# Patient Record
Sex: FEMALE | Race: WHITE | Employment: OTHER | URBAN - METROPOLITAN AREA
[De-identification: names, ages, dates, MRNs, and addresses within clinical notes are randomized per-mention and may not be internally consistent; named-entity substitution may affect disease eponyms.]

---

## 2024-01-15 ENCOUNTER — TELEPHONE (OUTPATIENT)
Dept: PULMONOLOGY | Age: 69
End: 2024-01-15

## 2024-01-15 NOTE — TELEPHONE ENCOUNTER
Npt r/b Asimanjulag, Lung Nodule    Please advise for scheduling    Referral and Ct results scanned into media

## 2024-02-21 ENCOUNTER — OFFICE VISIT (OUTPATIENT)
Dept: PULMONOLOGY | Age: 69
End: 2024-02-21
Payer: MEDICARE

## 2024-02-21 ENCOUNTER — TELEPHONE (OUTPATIENT)
Dept: PULMONOLOGY | Age: 69
End: 2024-02-21

## 2024-02-21 ENCOUNTER — PREP FOR PROCEDURE (OUTPATIENT)
Dept: PULMONOLOGY | Age: 69
End: 2024-02-21

## 2024-02-21 VITALS
SYSTOLIC BLOOD PRESSURE: 114 MMHG | WEIGHT: 149.6 LBS | HEIGHT: 64 IN | RESPIRATION RATE: 24 BRPM | DIASTOLIC BLOOD PRESSURE: 72 MMHG | HEART RATE: 114 BPM | OXYGEN SATURATION: 96 % | BODY MASS INDEX: 25.54 KG/M2

## 2024-02-21 DIAGNOSIS — J47.9 BRONCHIECTASIS WITHOUT COMPLICATION (HCC): ICD-10-CM

## 2024-02-21 DIAGNOSIS — R91.1 PULMONARY NODULE: ICD-10-CM

## 2024-02-21 DIAGNOSIS — J44.1 COPD EXACERBATION (HCC): ICD-10-CM

## 2024-02-21 DIAGNOSIS — R93.89 ABNORMAL CT OF THE CHEST: ICD-10-CM

## 2024-02-21 DIAGNOSIS — R91.1 PULMONARY NODULE: Primary | ICD-10-CM

## 2024-02-21 PROCEDURE — G8484 FLU IMMUNIZE NO ADMIN: HCPCS | Performed by: INTERNAL MEDICINE

## 2024-02-21 PROCEDURE — 1123F ACP DISCUSS/DSCN MKR DOCD: CPT | Performed by: INTERNAL MEDICINE

## 2024-02-21 PROCEDURE — G8400 PT W/DXA NO RESULTS DOC: HCPCS | Performed by: INTERNAL MEDICINE

## 2024-02-21 PROCEDURE — 4004F PT TOBACCO SCREEN RCVD TLK: CPT | Performed by: INTERNAL MEDICINE

## 2024-02-21 PROCEDURE — 1090F PRES/ABSN URINE INCON ASSESS: CPT | Performed by: INTERNAL MEDICINE

## 2024-02-21 PROCEDURE — G8419 CALC BMI OUT NRM PARAM NOF/U: HCPCS | Performed by: INTERNAL MEDICINE

## 2024-02-21 PROCEDURE — 3023F SPIROM DOC REV: CPT | Performed by: INTERNAL MEDICINE

## 2024-02-21 PROCEDURE — G8427 DOCREV CUR MEDS BY ELIG CLIN: HCPCS | Performed by: INTERNAL MEDICINE

## 2024-02-21 PROCEDURE — 99205 OFFICE O/P NEW HI 60 MIN: CPT | Performed by: INTERNAL MEDICINE

## 2024-02-21 PROCEDURE — 3017F COLORECTAL CA SCREEN DOC REV: CPT | Performed by: INTERNAL MEDICINE

## 2024-02-21 RX ORDER — FLUTICASONE FUROATE, UMECLIDINIUM BROMIDE AND VILANTEROL TRIFENATATE 100; 62.5; 25 UG/1; UG/1; UG/1
1 POWDER RESPIRATORY (INHALATION) DAILY
Qty: 60 EACH | Refills: 5 | Status: SHIPPED | OUTPATIENT
Start: 2024-02-21

## 2024-02-21 RX ORDER — SODIUM CHLORIDE 0.9 % (FLUSH) 0.9 %
5-40 SYRINGE (ML) INJECTION EVERY 12 HOURS SCHEDULED
Status: CANCELLED | OUTPATIENT
Start: 2024-02-21

## 2024-02-21 RX ORDER — POLYETHYLENE GLYCOL 3350 17 G/17G
17 POWDER, FOR SOLUTION ORAL DAILY
COMMUNITY

## 2024-02-21 RX ORDER — SIMETHICONE 125 MG
250 CAPSULE ORAL
COMMUNITY

## 2024-02-21 RX ORDER — DIPHENHYDRAMINE HCL 25 MG
25 CAPSULE ORAL NIGHTLY PRN
COMMUNITY

## 2024-02-21 RX ORDER — SODIUM CHLORIDE 0.9 % (FLUSH) 0.9 %
5-40 SYRINGE (ML) INJECTION PRN
Status: CANCELLED | OUTPATIENT
Start: 2024-02-21

## 2024-02-21 RX ORDER — AMLODIPINE BESYLATE 5 MG/1
5 TABLET ORAL DAILY
COMMUNITY

## 2024-02-21 RX ORDER — VARENICLINE TARTRATE 0.5 MG/1
.5-1 TABLET, FILM COATED ORAL SEE ADMIN INSTRUCTIONS
Qty: 57 TABLET | Refills: 0 | Status: SHIPPED | OUTPATIENT
Start: 2024-02-21

## 2024-02-21 RX ORDER — PREDNISONE 10 MG/1
TABLET ORAL
Qty: 30 TABLET | Refills: 0 | Status: SHIPPED | OUTPATIENT
Start: 2024-02-21 | End: 2024-03-04

## 2024-02-21 RX ORDER — VARENICLINE TARTRATE 1 MG/1
1 TABLET, FILM COATED ORAL 2 TIMES DAILY
Qty: 60 TABLET | Refills: 5 | Status: SHIPPED | OUTPATIENT
Start: 2024-02-21

## 2024-02-21 RX ORDER — ATORVASTATIN CALCIUM 10 MG/1
10 TABLET, FILM COATED ORAL NIGHTLY
COMMUNITY

## 2024-02-21 RX ORDER — HYDROCHLOROTHIAZIDE 12.5 MG/1
12.5 TABLET ORAL DAILY
COMMUNITY
Start: 2023-12-16

## 2024-02-21 RX ORDER — SODIUM CHLORIDE 9 MG/ML
INJECTION, SOLUTION INTRAVENOUS PRN
Status: CANCELLED | OUTPATIENT
Start: 2024-02-21

## 2024-02-21 NOTE — PROGRESS NOTES
[facility]                                                                    CHIEF COMPLAINT: Lung nodule     Consulting provider:  Dr. Velarde       HPI:   68 y.o. had CT chest to evaluate for chest pain. CT reviewed  by me and showed small RML 9 mm nodule. Not sure what makes better or worse. Has SOB cough and wheezes. Cough with clear sputum. No hemoptysis. No fever. Smoker 1 ppd since age 22. Used Albuterol up until 1 week ago stopped pallavi to chest pain. No cancer history.  Old records from outside reviewed by me and summarized.    Past Medical History:   Diagnosis Date    Arthritis     Cataract     Hiatal hernia     Hypertension        Past Surgical History:        Procedure Laterality Date    BREAST LUMPECTOMY  1984    BREAST LUMPECTOMY  1985    CATARACT EXTRACTION      COLONOSCOPY  2022    COLONOSCOPY  2014    GALLBLADDER SURGERY  2005    HYSTERECTOMY (CERVIX STATUS UNKNOWN)  1980    SMALL INTESTINE SURGERY  1971    UTERUS SURGERY  1978    cyst removal       Allergies:  is allergic to albuterol.  Social History:    TOBACCO:   reports that she has been smoking cigarettes. She started smoking about 47 years ago. She has a 47.1 pack-year smoking history. She has never used smokeless tobacco.  ETOH:   has no history on file for alcohol use.      Family History:       Problem Relation Age of Onset    High Blood Pressure Mother     Diabetes Mother     Heart Disease Mother     High Blood Pressure Father     Heart Disease Father        Current Medications:    Current Outpatient Medications:     amLODIPine (NORVASC) 5 MG tablet, Take 1 tablet by mouth daily, Disp: , Rfl:     hydroCHLOROthiazide 12.5 MG tablet, Take 1 tablet by mouth daily, Disp: , Rfl:     atorvastatin (LIPITOR) 10 MG tablet, Take 1 tablet by mouth nightly, Disp: , Rfl:     diphenhydrAMINE (BENADRYL) 25 MG capsule, Take 1 capsule by mouth nightly as needed, Disp: , Rfl:

## 2024-02-21 NOTE — PATIENT INSTRUCTIONS
PET scan will be scheduled-follow prep sheet given.     Bronchoscopy has been set up for _________ arrival time _________ at [facility].  [facility]   Nothing to eat or drink after midnight prior to the procedure.   Must have a  to bring you to and from the procedure.   Hold blood thinners as instructed prior to the procedure.

## 2024-02-21 NOTE — TELEPHONE ENCOUNTER
Pt scheduled for PET scan 2/23/24 at 10:15 am, arrival time 9:45 am.  Watch for results.     Pt also needs to be scheduled for robotic bronch.  Submitted case request for 3/4/24. Will need to confirm date/time and notify pt.

## 2024-02-23 ENCOUNTER — HOSPITAL ENCOUNTER (OUTPATIENT)
Dept: PET IMAGING | Age: 69
Discharge: HOME | End: 2024-02-23
Payer: MEDICARE

## 2024-02-23 ENCOUNTER — TELEPHONE (OUTPATIENT)
Dept: PULMONOLOGY | Age: 69
End: 2024-02-23

## 2024-02-23 DIAGNOSIS — R91.1 PULMONARY NODULE: ICD-10-CM

## 2024-02-23 PROCEDURE — 78815 PET IMAGE W/CT SKULL-THIGH: CPT

## 2024-02-23 PROCEDURE — A9609 HC RX DIAGNOSTIC RADIOPHARMACEUTICAL: HCPCS | Performed by: INTERNAL MEDICINE

## 2024-02-23 PROCEDURE — 3430000000 HC RX DIAGNOSTIC RADIOPHARMACEUTICAL: Performed by: INTERNAL MEDICINE

## 2024-02-23 RX ORDER — FLUDEOXYGLUCOSE F 18 200 MCI/ML
15.5 INJECTION, SOLUTION INTRAVENOUS
Status: COMPLETED | OUTPATIENT
Start: 2024-02-23 | End: 2024-02-23

## 2024-02-23 RX ADMIN — FLUDEOXYGLUCOSE F 18 15.5 MILLICURIE: 200 INJECTION, SOLUTION INTRAVENOUS at 09:27

## 2024-02-26 ENCOUNTER — HOSPITAL ENCOUNTER (OUTPATIENT)
Dept: CT IMAGING | Age: 69
Discharge: HOME | End: 2024-02-26
Payer: MEDICARE

## 2024-02-26 DIAGNOSIS — R91.1 PULMONARY NODULE: ICD-10-CM

## 2024-02-26 PROCEDURE — 71250 CT THORAX DX C-: CPT

## 2024-02-28 ENCOUNTER — OFFICE VISIT (OUTPATIENT)
Dept: PULMONOLOGY | Age: 69
End: 2024-02-28
Payer: MEDICARE

## 2024-02-28 VITALS
WEIGHT: 150.2 LBS | RESPIRATION RATE: 17 BRPM | DIASTOLIC BLOOD PRESSURE: 70 MMHG | HEART RATE: 90 BPM | OXYGEN SATURATION: 99 % | SYSTOLIC BLOOD PRESSURE: 112 MMHG | HEIGHT: 64 IN | BODY MASS INDEX: 25.64 KG/M2

## 2024-02-28 DIAGNOSIS — J47.9 BRONCHIECTASIS WITHOUT COMPLICATION (HCC): ICD-10-CM

## 2024-02-28 DIAGNOSIS — J44.9 CHRONIC OBSTRUCTIVE PULMONARY DISEASE, UNSPECIFIED COPD TYPE (HCC): ICD-10-CM

## 2024-02-28 DIAGNOSIS — R91.1 PULMONARY NODULE: Primary | ICD-10-CM

## 2024-02-28 PROCEDURE — G8400 PT W/DXA NO RESULTS DOC: HCPCS | Performed by: INTERNAL MEDICINE

## 2024-02-28 PROCEDURE — G8484 FLU IMMUNIZE NO ADMIN: HCPCS | Performed by: INTERNAL MEDICINE

## 2024-02-28 PROCEDURE — G8427 DOCREV CUR MEDS BY ELIG CLIN: HCPCS | Performed by: INTERNAL MEDICINE

## 2024-02-28 PROCEDURE — 4004F PT TOBACCO SCREEN RCVD TLK: CPT | Performed by: INTERNAL MEDICINE

## 2024-02-28 PROCEDURE — 99214 OFFICE O/P EST MOD 30 MIN: CPT | Performed by: INTERNAL MEDICINE

## 2024-02-28 PROCEDURE — 3017F COLORECTAL CA SCREEN DOC REV: CPT | Performed by: INTERNAL MEDICINE

## 2024-02-28 PROCEDURE — 1090F PRES/ABSN URINE INCON ASSESS: CPT | Performed by: INTERNAL MEDICINE

## 2024-02-28 PROCEDURE — G8419 CALC BMI OUT NRM PARAM NOF/U: HCPCS | Performed by: INTERNAL MEDICINE

## 2024-02-28 PROCEDURE — 3023F SPIROM DOC REV: CPT | Performed by: INTERNAL MEDICINE

## 2024-02-28 PROCEDURE — 1123F ACP DISCUSS/DSCN MKR DOCD: CPT | Performed by: INTERNAL MEDICINE

## 2024-02-28 RX ORDER — LEVALBUTEROL TARTRATE 45 UG/1
2 AEROSOL, METERED ORAL EVERY 4 HOURS PRN
Qty: 1 EACH | Refills: 3 | Status: SHIPPED | OUTPATIENT
Start: 2024-02-28 | End: 2025-02-27

## 2024-02-28 NOTE — TELEPHONE ENCOUNTER
Pt saw Dr. Cool this morning.  Cancelled bronch for 3/4/24.  Will need to check with PFT lab to see if pt can be worked in for PFT next week.      Pt is being referred to Dr. Amezcua as well, make sure she gets scheduled .

## 2024-02-28 NOTE — PROGRESS NOTES
[facility]                                                                    CHIEF COMPLAINT: Follow-up CT/PET      HPI:   PET scan/CT scan  reviewed by me and noted below. Results were dicussed with patient and multiple good questions were answered.    Doing better with IBD  On chantix and plan to quit smoking   Some cough with clear sputum  Not needing to use the rescue inhaler- Xopenex   No hemoptysis   Patient gave with more thoughts now not interested in having biopsy and wants to go straight for surgical resection    From prior visit:   68 y.o. had CT chest to evaluate for chest pain. CT reviewed  by me and showed small RML 9 mm nodule. Not sure what makes better or worse. Has SOB cough and wheezes. Cough with clear sputum. No hemoptysis. No fever. Smoker 1 ppd since age 22. Used Albuterol up until 1 week ago stopped pallavi to chest pain. No cancer history.  Old records from outside reviewed by me and summarized.    Past Medical History:   Diagnosis Date    Arthritis     Cataract     Hiatal hernia     Hypertension        Past Surgical History:        Procedure Laterality Date    BREAST LUMPECTOMY  1984    BREAST LUMPECTOMY  1985    CATARACT EXTRACTION      COLONOSCOPY  2022    COLONOSCOPY  2014    GALLBLADDER SURGERY  2005    HYSTERECTOMY (CERVIX STATUS UNKNOWN)  1980    SMALL INTESTINE SURGERY  1971    UTERUS SURGERY  1978    cyst removal       Allergies:  is allergic to albuterol.  Social History:    TOBACCO:   reports that she has been smoking cigarettes. She started smoking about 47 years ago. She has a 47.2 pack-year smoking history. She has never used smokeless tobacco.  ETOH:   reports no history of alcohol use.      Family History:       Problem Relation Age of Onset    High Blood Pressure Mother     Diabetes Mother     Heart Disease Mother     High Blood Pressure Father     Heart Disease Father        Current

## 2024-03-11 VITALS — HEART RATE: 72 BPM | RESPIRATION RATE: 14 BRPM | OXYGEN SATURATION: 99 %

## 2024-03-11 DIAGNOSIS — R91.1 PULMONARY NODULE: ICD-10-CM

## 2024-03-12 LAB
DLCO %PRED: 81 %
DLCO PRED: NORMAL
DLCO/VA %PRED: NORMAL
DLCO/VA PRED: NORMAL
DLCO/VA: NORMAL
DLCO: NORMAL
EXPIRATORY TIME-POST: NORMAL
EXPIRATORY TIME: NORMAL
FEF 25-75 %CHNG: NORMAL
FEF 25-75 POST %PRED: NORMAL
FEF 25-75% %PRED-PRE: NORMAL
FEF 25-75% PRED: NORMAL
FEF 25-75-POST: NORMAL
FEF 25-75-PRE: NORMAL
FEV1 %PRED-POST: NORMAL
FEV1 %PRED-PRE: 105 %
FEV1 PRED: NORMAL
FEV1-POST: NORMAL
FEV1-PRE: NORMAL
FEV1/FVC %PRED-POST: NORMAL
FEV1/FVC %PRED-PRE: NORMAL
FEV1/FVC PRED: NORMAL
FEV1/FVC-POST: NORMAL
FEV1/FVC-PRE: 108 %
FVC %PRED-POST: NORMAL
FVC %PRED-PRE: NORMAL
FVC PRED: NORMAL
FVC-POST: NORMAL
FVC-PRE: NORMAL
GAW %PRED: NORMAL
GAW PRED: NORMAL
GAW: NORMAL
IC PRE %PRED: NORMAL
IC PRED: NORMAL
IC: NORMAL
MEP: NORMAL
MIP: NORMAL
MVV %PRED-PRE: NORMAL
MVV PRED: NORMAL
MVV-PRE: NORMAL
PEF %PRED-POST: NORMAL
PEF %PRED-PRE: NORMAL
PEF PRED: NORMAL
PEF%CHNG: NORMAL
PEF-POST: NORMAL
PEF-PRE: NORMAL
RAW %PRED: NORMAL
RAW PRED: NORMAL
RAW: NORMAL
RV PRE %PRED: NORMAL
RV PRED: NORMAL
RV: NORMAL
SVC %PRED: NORMAL
SVC PRED: NORMAL
SVC: NORMAL
TLC PRE %PRED: 104 %
TLC PRED: NORMAL
TLC: NORMAL
VA %PRED: NORMAL
VA PRED: NORMAL
VA: NORMAL
VTG %PRED: NORMAL
VTG PRED: NORMAL
VTG: NORMAL

## 2024-03-12 ASSESSMENT — PULMONARY FUNCTION TESTS
FEV1_PERCENT_PREDICTED_PRE: 105
FEV1/FVC_PRE: 108

## 2024-03-14 NOTE — TELEPHONE ENCOUNTER
PFT result was scanned in media for pts appt today with Dr. Amezcua.  Follow up to determine if surgery gets scheduled.

## 2024-03-15 NOTE — TELEPHONE ENCOUNTER
Per note from Dr. Amezcua to Dr. Cool:     FW: Follow-Up Consult  Received: Yesterday  James Cool MD Jones, Amanda N, MA  See me in 4 weeks

## 2024-03-19 ENCOUNTER — TELEPHONE (OUTPATIENT)
Dept: PULMONOLOGY | Age: 69
End: 2024-03-19

## 2024-03-19 NOTE — TELEPHONE ENCOUNTER
Called [phone] (home)    Spoke w/ pt and informed to stop chantix and visit ED per Dr. Harding. . Pt voiced understanding.

## 2024-03-19 NOTE — TELEPHONE ENCOUNTER
Pt called and stated that she has been experiencing chest pain that has been getting increasingly worse. The pt believes the timing lines up with when pt started taking Chantix. Please advise.

## 2024-03-25 NOTE — PROGRESS NOTES
Cristy Thrasher    Age 68 y.o.    female    1955    MRN [id number]    4/15/2024  Arrival Time_____________  OR Time____________210 Min     Procedure(s):  RIGHT ROBOTIC MIDDLE LOBE  LOBECTOMY WITH LYMPH NODE DISSECTION  NOTE: CRYO NERVE BLOCK                      General   Surgeon(s):  Jose Manuel Amezcua, MD Sena, Lexie S, MD      DAY ADMIT ___  SDS/OP ___  OUTPT IN BED ___        Phone [phone] (home)                  PCP _____________________ Phone_________________ Epic ( ) Epic CE ( ) Appt ________    NOTES: _________________________________________ Consult/Cardio _______________    ____________________________________________________________________________    ____________________________________________________________________________  PAT APPT DATE:________ TIME: ________  FAXED QAD: _______  (__) H&P w/ Hospitalist    (__) PAT orders in EPIC    (__) Meet with PAT nurse  __________________________________________________________________________  Preop Nurse phone screen complete: _____________  (__) CBC     (__) W/ DIFF ___________  (__) CT CHEST  __________   (__) Hgb A1C    ___________  (__) CHEST X RAY   __________  (__) LIPID PROFILE  ___________  (__) EKG   __________  (__) PT-INR / APTT  ___________  (__) PFT's   __________  (__) BMP   ___________  (__) CAROTIDS  __________  (__) CMP   ___________  (__) VEIN MAPPING  __________  (__) U/A   ___________  (__) HISTORY & PHYSICAL __________  (__) URINE C & S  ___________  (__) CARDIAC CLEARANCE __________  (__) U/A W/ FLEX  ___________  (__) PULM. CLEARANCE __________  (__) SERUM PREGNANCY ___________  (__) Preop Orders in EPIC __________  (__) TYPE & SCREEN __________repeat ( ) (__)  __________________ __________  (__) Albumin   ___________  (__)  __________________ __________  (__) TRANSFERRIN  ___________  (__)  __________________ __________  (__) LIVER PROFILE  ___________  (__) URINE PREG DOS __________  (__) MRSA NASAL SWAB ___________  (__)

## 2024-04-03 RX ORDER — M-VIT,TX,IRON,MINS/CALC/FOLIC 27MG-0.4MG
1 TABLET ORAL DAILY
COMMUNITY

## 2024-04-03 NOTE — PROGRESS NOTES
Surgery Date and Time: 4/15/24 12:30 pm      Arrival Time: 10:30 am    The instructions given when and if a patient needs to stop oral intake prior to surgery varies. Follow the instructions you were given by your    Surgeon or RN during the Pre-op call.       __X__Nothing to eat or to drink after Midnight the night before the surgery. NO gum, mints, candy or ice chips day of surgery.                            Only take the following medications with a small sip of water the morning of surgery: amlodipine. Use trelegy inhaler                     Aspirin, Ibuprofen, Advil, Naproxen, Vitamin E and other Anti-inflammatory products and supplements should be stopped for 5 -7days before surgery      or as directed by your physician.      Check with your Surgeon/PCP/Cardiologist regarding stopping Plavix, Coumadin, Eliquis, Lovenox, Effient, Pradaxa, Xarelto, Fragmin or other blood thinners     and follow their instructions.  Medication: N/A                Last dose:                - If you take a long acting-insulin, please ask your Primary Care Physician if you should decrease your dose the night before surgery.    - Do not smoke or vape, and do not drink any alcoholic beverages 24 hours prior to surgery, this includes NA Beer. Refrain from using any recreational drugs,     including non-prescribed prescription drugs.     -You may brush your teeth and gargle the morning of surgery.  DO NOT SWALLOW WATER.    -You MUST plan for a responsible adult to stay on site while you are here and take you home after your surgery. You will not be allowed to leave alone or drive               yourself home. It is requested someone stay with you the first 24 hrs. Your surgery will be cancelled if you do not have a ride home with a responsible adult.    -A parent/legal guardian must accompany a child scheduled for surgery and plan to stay at the hospital until the child is discharged.  Please do not bring other                children  with you.    -Please wear simple, loose-fitting clothing to the hospital. Do not bring valuables (money, credit cards, checkbooks, etc.) Do not wear any makeup (including                no eye makeup) and no nail polish if applicable.             - DO NOT wear any jewelry or body piercings day of surgery.  All body piercing jewelry must be removed.             - If you have dentures they will be removed before going to the OR; we will provide a container.  If you wear contact lenses or glasses, they will be removed,               bring a case for them or wear glasses day of surgery.             - Please see your family doctor/pediatrician for a Preop History & Physical and/or concerning medications within 30 days of the surgery date.                  Non-[facility] physicians can fax H&P/test results to [phone]. You may need cardiac clearance if you see a cardiologist, check with PCP or surgeon.              -If you have a Living Will and Durable Power of  for Healthcare, please bring in a copy to be scanned at registration.              -Notify your Surgeon if you develop any illness between now and the surgery date, cough, cold, fever, sore throat, nausea, vomiting, etc.  Please notify your                surgeon if you experience dizziness, shortness of breath or blurred vision between now & the time of your surgery.              -DO NOT shave your operative site less than 4 days prior to surgery. For face & neck surgery, men may use an electric razor up to 2 days prior to surgery.   -To help prevent infection, change your sheets the night before surgery. Also, shower the night before & morning of surgery using an antibacterial     soap (Dial or Safeguard) or Hibiclens soap as instructed by your surgeon. Do not apply lotion after shower or day of surgery.              -To provide excellent care visitors will be limited to two per room at any given time.              -Please bring your picture ID and

## 2024-04-08 ENCOUNTER — OFFICE VISIT (OUTPATIENT)
Dept: PULMONOLOGY | Age: 69
End: 2024-04-08
Payer: MEDICARE

## 2024-04-08 VITALS
WEIGHT: 146.2 LBS | SYSTOLIC BLOOD PRESSURE: 122 MMHG | DIASTOLIC BLOOD PRESSURE: 86 MMHG | RESPIRATION RATE: 20 BRPM | BODY MASS INDEX: 24.96 KG/M2 | HEART RATE: 87 BPM | OXYGEN SATURATION: 97 % | HEIGHT: 64 IN

## 2024-04-08 DIAGNOSIS — J47.9 BRONCHIECTASIS WITHOUT COMPLICATION (HCC): ICD-10-CM

## 2024-04-08 DIAGNOSIS — R93.89 ABNORMAL CT OF THE CHEST: ICD-10-CM

## 2024-04-08 DIAGNOSIS — R91.1 PULMONARY NODULE: Primary | ICD-10-CM

## 2024-04-08 PROCEDURE — G8400 PT W/DXA NO RESULTS DOC: HCPCS | Performed by: INTERNAL MEDICINE

## 2024-04-08 PROCEDURE — 99214 OFFICE O/P EST MOD 30 MIN: CPT | Performed by: INTERNAL MEDICINE

## 2024-04-08 PROCEDURE — 1036F TOBACCO NON-USER: CPT | Performed by: INTERNAL MEDICINE

## 2024-04-08 PROCEDURE — 1123F ACP DISCUSS/DSCN MKR DOCD: CPT | Performed by: INTERNAL MEDICINE

## 2024-04-08 PROCEDURE — 1090F PRES/ABSN URINE INCON ASSESS: CPT | Performed by: INTERNAL MEDICINE

## 2024-04-08 PROCEDURE — 3017F COLORECTAL CA SCREEN DOC REV: CPT | Performed by: INTERNAL MEDICINE

## 2024-04-08 PROCEDURE — G8427 DOCREV CUR MEDS BY ELIG CLIN: HCPCS | Performed by: INTERNAL MEDICINE

## 2024-04-08 PROCEDURE — G8419 CALC BMI OUT NRM PARAM NOF/U: HCPCS | Performed by: INTERNAL MEDICINE

## 2024-04-08 NOTE — PROGRESS NOTES
[facility] Pulmonary, Critical [facility] Sleep Special[facility]                                                                    CHIEF COMPLAINT: Follow-up nodule       HPI:   Surgery scheduled for Monday   Doing fine   No cough or sputum  No hemoptysis   Smoking still 2 cig/day    Xopenex once a day         From prior visit:   68 y.o. had CT chest to evaluate for chest pain. CT reviewed  by me and showed small RML 9 mm nodule. Not sure what makes better or worse. Has SOB cough and wheezes. Cough with clear sputum. No hemoptysis. No fever. Smoker 1 ppd since age 22. Used Albuterol up until 1 week ago stopped pallavi to chest pain. No cancer history.  Old records from outside reviewed by me and summarized.    Past Medical History:   Diagnosis Date    Arthritis     Cataract     Hiatal hernia     Hyperlipidemia     Hypertension        Past Surgical History:        Procedure Laterality Date    BREAST LUMPECTOMY  1984    BREAST LUMPECTOMY  1985    CATARACT EXTRACTION      Pt has cataract in right eye but states has not been removed    COLONOSCOPY  2022    COLONOSCOPY  2014    GALLBLADDER SURGERY  2005    HYSTERECTOMY (CERVIX STATUS UNKNOWN)  1980    SMALL INTESTINE SURGERY  1971    UTERUS SURGERY  1978    cyst removal       Allergies:  is allergic to albuterol.  Social History:    TOBACCO:   reports that she quit smoking about 5 weeks ago. Her smoking use included cigarettes. She started smoking about 47 years ago. She has a 47.2 pack-year smoking history. She has never used smokeless tobacco.  ETOH:   reports no history of alcohol use.      Family History:       Problem Relation Age of Onset    High Blood Pressure Mother     Diabetes Mother     Heart Disease Mother     High Blood Pressure Father     Heart Disease Father        Current Medications:    Current Outpatient Medications:     Multiple Vitamins-Minerals (THERAPEUTIC MULTIVITAMIN-MINERALS) tablet, Take 1 tablet by mouth daily, Disp: , Rfl:

## 2024-04-10 RX ORDER — OXYCODONE HYDROCHLORIDE AND ACETAMINOPHEN 5; 325 MG/1; MG/1
1 TABLET ORAL EVERY 4 HOURS PRN
COMMUNITY
End: 2024-04-10

## 2024-04-10 NOTE — PROGRESS NOTES
Cristy Thrasher    Age 68 y.o.    female    1955    MRN [id number]    4/15/2024  Arrival Time_____________  OR Time____________210 Min     Procedure(s):  RIGHT ROBOTIC MIDDLE LOBE  LOBECTOMY WITH LYMPH NODE DISSECTION  NOTE: CRYO NERVE BLOCK                      General   Surgeon(s):  Jose Manuel Amezcua, MD Sena, Lexie S, MD      DAY ADMIT ___  SDS/OP ___  OUTPT IN BED ___        Phone [phone] (home)                  PCP _____________________ Phone_________________ Epic ( ) Epic CE ( ) Appt ________    NOTES: _________________________________________ Consult/Cardio _______________    ____________________________________________________________________________    ____________________________________________________________________________  PAT APPT DATE:________ TIME: ________  FAXED QAD: _______  (__) H&P w/ Hospitalist    (__) PAT orders in EPIC    (__) Meet with PAT nurse  __________________________________________________________________________  Preop Nurse phone screen complete: _____________  (__) CBC     (__) W/ DIFF ___________  (__) CT CHEST  __________   (__) Hgb A1C    ___________  (__) CHEST X RAY   __________  (__) LIPID PROFILE  ___________  (__) EKG   __________  (__) PT-INR / APTT  ___________  (__) PFT's   __________  (__) BMP   ___________  (__) CAROTIDS  __________  (__) CMP   ___________  (__) VEIN MAPPING  __________  (__) U/A   ___________  (__) HISTORY & PHYSICAL __________  (__) URINE C & S  ___________  (__) CARDIAC CLEARANCE __________  (__) U/A W/ FLEX  ___________  (__) PULM. CLEARANCE __________  (__) SERUM PREGNANCY ___________  (__) Preop Orders in EPIC __________  (__) TYPE & SCREEN __________repeat ( ) (__)  __________________ __________  (__) Albumin   ___________  (__)  __________________ __________  (__) TRANSFERRIN  ___________  (__)  __________________ __________  (__) LIVER PROFILE  ___________  (__) URINE PREG DOS __________  (__) MRSA NASAL SWAB ___________  (__)

## 2024-04-11 ENCOUNTER — OFFICE VISIT (OUTPATIENT)
Age: 69
End: 2024-04-11
Payer: MEDICARE

## 2024-04-11 VITALS
BODY MASS INDEX: 25.1 KG/M2 | SYSTOLIC BLOOD PRESSURE: 124 MMHG | HEART RATE: 79 BPM | HEIGHT: 64 IN | WEIGHT: 147 LBS | DIASTOLIC BLOOD PRESSURE: 72 MMHG | OXYGEN SATURATION: 98 %

## 2024-04-11 DIAGNOSIS — R07.2 PRECORDIAL PAIN: ICD-10-CM

## 2024-04-11 DIAGNOSIS — I25.10 CORONARY ARTERY DISEASE INVOLVING NATIVE CORONARY ARTERY OF NATIVE HEART WITHOUT ANGINA PECTORIS: ICD-10-CM

## 2024-04-11 DIAGNOSIS — R06.02 SOB (SHORTNESS OF BREATH): ICD-10-CM

## 2024-04-11 DIAGNOSIS — Z01.810 PREOPERATIVE CARDIOVASCULAR EXAMINATION: Primary | ICD-10-CM

## 2024-04-11 DIAGNOSIS — Z01.810 PREOP CARDIOVASCULAR EXAM: ICD-10-CM

## 2024-04-11 PROCEDURE — 1090F PRES/ABSN URINE INCON ASSESS: CPT | Performed by: INTERNAL MEDICINE

## 2024-04-11 PROCEDURE — 1123F ACP DISCUSS/DSCN MKR DOCD: CPT | Performed by: INTERNAL MEDICINE

## 2024-04-11 PROCEDURE — 99204 OFFICE O/P NEW MOD 45 MIN: CPT | Performed by: INTERNAL MEDICINE

## 2024-04-11 PROCEDURE — G8427 DOCREV CUR MEDS BY ELIG CLIN: HCPCS | Performed by: INTERNAL MEDICINE

## 2024-04-11 PROCEDURE — 3017F COLORECTAL CA SCREEN DOC REV: CPT | Performed by: INTERNAL MEDICINE

## 2024-04-11 PROCEDURE — 1036F TOBACCO NON-USER: CPT | Performed by: INTERNAL MEDICINE

## 2024-04-11 PROCEDURE — 93000 ELECTROCARDIOGRAM COMPLETE: CPT | Performed by: INTERNAL MEDICINE

## 2024-04-11 PROCEDURE — G8400 PT W/DXA NO RESULTS DOC: HCPCS | Performed by: INTERNAL MEDICINE

## 2024-04-11 PROCEDURE — G8419 CALC BMI OUT NRM PARAM NOF/U: HCPCS | Performed by: INTERNAL MEDICINE

## 2024-04-11 RX ORDER — ESOMEPRAZOLE MAGNESIUM 20 MG/1
20 GRANULE, DELAYED RELEASE ORAL DAILY
COMMUNITY

## 2024-04-11 NOTE — PROGRESS NOTES
spheres  Moves all extremities well  Exhibits normal gait balance and coordination  No abnormalities of mood, affect, memory, mentation, or behavior are noted    CT lung screening 1/9/2024        Assessment:   Chest pain - typical/atypical features  SOB - cardiac vs pulm. Consider angina equivalent   Preoperative cardiac risk assessment for right robotic lobectomy and ICNB with Dr. Amezcua and Dr. Sena on 4/15/2024.    ~Right middle lobe pulmonary nodule   Coronary artery disease- noted on CT lung screening. Severity known    Hypertension - stable  Hyperlipidemia  Smoking - cessation advised   Family history of heart diease     Plan:  ~Continue smoking cessation   ~Recommend an echocardiogram    ~Recommend a stress test- Lexiscan Myoview to evaluate chest pain, shortness of breath, and preop. Patient is not able to walk on a treadmill   ~Testing prior to clearance for surgery     Cardiac medications reviewed including indications and pertinent side effects. Medication list updated at this visit.   Patient verbalizes understanding of the need for treatment and education has been provided at today's visit. Additional education material will be provided in after visit summary.    Check blood pressure and heart rate at home a few times per week- keep a log with dates and times and bring to office visit   Regular exercise and following a healthy diet encouraged   Follow up with me based on testing       Scribe's attestation:  This note was scribed in the presence of Dr. Almas Greene M.D. By Erika Jack RN    The scribe’s documentation has been prepared under my direction and personally reviewed by me in its entirety.  I confirm that the note above accurately reflects all work, treatment, procedures, and medical decision making performed by me.    Dr. Almas Greene MD      Thank you for allowing me to participate in the care of this individual.      Jhonatan Greene M.D., [facility], [facility]

## 2024-04-11 NOTE — PROGRESS NOTES
Spoke with pt and she states did not realize still needed labs pre op even though we talked about it 4/3/24 during PAT call. States will have it done tomorrow either at [facility], [place], or [facility] after cardiac testing.

## 2024-04-11 NOTE — PATIENT INSTRUCTIONS
Plan:  ~Continue smoking cessation   ~Recommend an echocardiogram which is an ultrasound of your heart to evaluate heart function, structures and valves.   ~Recommend a stress test- Lexiscan Myoview to evaluate chest pain, shortness of breath, and preop. Patient is not able to walk on a treadmill   Call [facility] scheduling at [phone] to schedule testing    ~Testing prior to clearance for surgery     Cardiac medications reviewed including indications and pertinent side effects. Medication list updated at this visit.   Patient verbalizes understanding of the need for treatment and education has been provided at today's visit. Additional education material will be provided in after visit summary.    Check blood pressure and heart rate at home a few times per week- keep a log with dates and times and bring to office visit   Regular exercise and following a healthy diet encouraged   Follow up with me based on testing

## 2024-04-12 ENCOUNTER — TELEPHONE (OUTPATIENT)
Dept: CARDIOLOGY CLINIC | Age: 69
End: 2024-04-12

## 2024-04-12 ENCOUNTER — HOSPITAL ENCOUNTER (OUTPATIENT)
Dept: NON INVASIVE DIAGNOSTICS | Age: 69
Discharge: HOME | End: 2024-04-12
Payer: MEDICARE

## 2024-04-12 ENCOUNTER — HOSPITAL ENCOUNTER (OUTPATIENT)
Dept: NON INVASIVE DIAGNOSTICS | Age: 69
Discharge: HOME | End: 2024-04-12
Attending: INTERNAL MEDICINE
Payer: MEDICARE

## 2024-04-12 ENCOUNTER — TELEPHONE (OUTPATIENT)
Dept: CARDIOTHORACIC SURGERY | Age: 69
End: 2024-04-12

## 2024-04-12 ENCOUNTER — ANESTHESIA EVENT (OUTPATIENT)
Dept: OPERATING ROOM | Age: 69
DRG: 165 | End: 2024-04-12
Payer: MEDICARE

## 2024-04-12 DIAGNOSIS — I25.10 CORONARY ARTERY DISEASE INVOLVING NATIVE CORONARY ARTERY OF NATIVE HEART WITHOUT ANGINA PECTORIS: ICD-10-CM

## 2024-04-12 DIAGNOSIS — R06.02 SOB (SHORTNESS OF BREATH): ICD-10-CM

## 2024-04-12 DIAGNOSIS — Z01.810 PREOPERATIVE CARDIOVASCULAR EXAMINATION: ICD-10-CM

## 2024-04-12 DIAGNOSIS — R07.2 PRECORDIAL PAIN: ICD-10-CM

## 2024-04-12 DIAGNOSIS — I31.39 PERICARDIAL EFFUSION: Primary | ICD-10-CM

## 2024-04-12 DIAGNOSIS — Z01.818 PREOPERATIVE TESTING: ICD-10-CM

## 2024-04-12 LAB
APTT BLD: 34.1 SEC (ref 22.1–36.4)
INR PPP: 0.93 (ref 0.85–1.15)
PROTHROMBIN TIME: 12.7 SEC (ref 11.9–14.9)

## 2024-04-12 PROCEDURE — 6360000002 HC RX W HCPCS: Performed by: INTERNAL MEDICINE

## 2024-04-12 PROCEDURE — 3430000000 HC RX DIAGNOSTIC RADIOPHARMACEUTICAL: Performed by: INTERNAL MEDICINE

## 2024-04-12 PROCEDURE — 78452 HT MUSCLE IMAGE SPECT MULT: CPT

## 2024-04-12 PROCEDURE — 93017 CV STRESS TEST TRACING ONLY: CPT

## 2024-04-12 PROCEDURE — A9502 TC99M TETROFOSMIN: HCPCS | Performed by: INTERNAL MEDICINE

## 2024-04-12 PROCEDURE — 93306 TTE W/DOPPLER COMPLETE: CPT

## 2024-04-12 RX ORDER — REGADENOSON 0.08 MG/ML
0.4 INJECTION, SOLUTION INTRAVENOUS
Status: COMPLETED | OUTPATIENT
Start: 2024-04-12 | End: 2024-04-12

## 2024-04-12 RX ADMIN — REGADENOSON 0.4 MG: 0.08 INJECTION, SOLUTION INTRAVENOUS at 10:15

## 2024-04-12 RX ADMIN — TETROFOSMIN 30 MILLICURIE: 1.38 INJECTION, POWDER, LYOPHILIZED, FOR SOLUTION INTRAVENOUS at 10:15

## 2024-04-12 RX ADMIN — TETROFOSMIN 10 MILLICURIE: 1.38 INJECTION, POWDER, LYOPHILIZED, FOR SOLUTION INTRAVENOUS at 08:35

## 2024-04-12 NOTE — TELEPHONE ENCOUNTER
Spoke with pt relayed message per [facility]. Placed clearance letter in chart as she requested.

## 2024-04-12 NOTE — TELEPHONE ENCOUNTER
Created telephone encounter. Spoke with Shara relayed message per [facility] regarding echo. Pt verbalized understanding.   She was given CS number to set up echo as requested.

## 2024-04-12 NOTE — TELEPHONE ENCOUNTER
Patient's daughter called stating patient completed testing today and is to hear about surgery following results. If she hasn't heard from anyone by 4:00 pm, I have given her Dr. Amezcua's nurse- Gemma's number to follow up.

## 2024-04-12 NOTE — TELEPHONE ENCOUNTER
----- Message from Almas Greene MD sent at 4/12/2024 12:55 PM EDT -----  Please notify patient that their stress test is normal  Ok for surgery

## 2024-04-12 NOTE — TELEPHONE ENCOUNTER
----- Message from Almas Greene MD sent at 4/12/2024 12:04 PM EDT -----  Please notify patient that their echo shows normal heart fxn  There is some fluid around heart and a valve that leaks that just need to be monitored for now  Repeat echo- limited 1 month (order placed)  Still waiting on stress test results. Should be later today

## 2024-04-13 LAB
ABO + RH BLD: NORMAL
ANION GAP SERPL CALCULATED.3IONS-SCNC: 12 MMOL/L (ref 3–16)
BASOPHILS # BLD: 0.1 K/UL (ref 0–0.2)
BASOPHILS NFR BLD: 1.2 %
BLD GP AB SCN SERPL QL: NORMAL
BUN SERPL-MCNC: 19 MG/DL (ref 7–20)
CALCIUM SERPL-MCNC: 10 MG/DL (ref 8.3–10.6)
CHLORIDE SERPL-SCNC: 102 MMOL/L (ref 99–110)
CO2 SERPL-SCNC: 29 MMOL/L (ref 21–32)
CREAT SERPL-MCNC: 0.8 MG/DL (ref 0.6–1.2)
DEPRECATED RDW RBC AUTO: 13.4 % (ref 12.4–15.4)
EOSINOPHIL # BLD: 0.2 K/UL (ref 0–0.6)
EOSINOPHIL NFR BLD: 2.6 %
EST. AVERAGE GLUCOSE BLD GHB EST-MCNC: 111.2 MG/DL
GFR SERPLBLD CREATININE-BSD FMLA CKD-EPI: 80 ML/MIN/{1.73_M2}
GLUCOSE SERPL-MCNC: 74 MG/DL (ref 70–99)
HBA1C MFR BLD: 5.5 %
HCT VFR BLD AUTO: 39.4 % (ref 36–48)
HGB BLD-MCNC: 13.8 G/DL (ref 12–16)
LYMPHOCYTES # BLD: 1.5 K/UL (ref 1–5.1)
LYMPHOCYTES NFR BLD: 22.5 %
MCH RBC QN AUTO: 33 PG (ref 26–34)
MCHC RBC AUTO-ENTMCNC: 35 G/DL (ref 31–36)
MCV RBC AUTO: 94.3 FL (ref 80–100)
MONOCYTES # BLD: 0.5 K/UL (ref 0–1.3)
MONOCYTES NFR BLD: 7.8 %
NEUTROPHILS # BLD: 4.3 K/UL (ref 1.7–7.7)
NEUTROPHILS NFR BLD: 65.9 %
PLATELET # BLD AUTO: 170 K/UL (ref 135–450)
PMV BLD AUTO: 10.9 FL (ref 5–10.5)
POTASSIUM SERPL-SCNC: 4.3 MMOL/L (ref 3.5–5.1)
RBC # BLD AUTO: 4.18 M/UL (ref 4–5.2)
SODIUM SERPL-SCNC: 143 MMOL/L (ref 136–145)
WBC # BLD AUTO: 6.6 K/UL (ref 4–11)

## 2024-04-15 ENCOUNTER — APPOINTMENT (OUTPATIENT)
Dept: GENERAL RADIOLOGY | Age: 69
DRG: 165 | End: 2024-04-15
Attending: STUDENT IN AN ORGANIZED HEALTH CARE EDUCATION/TRAINING PROGRAM
Payer: MEDICARE

## 2024-04-15 ENCOUNTER — ANESTHESIA (OUTPATIENT)
Dept: OPERATING ROOM | Age: 69
DRG: 165 | End: 2024-04-15
Payer: MEDICARE

## 2024-04-15 ENCOUNTER — HOSPITAL ENCOUNTER (INPATIENT)
Age: 69
LOS: 2 days | Discharge: HOME | DRG: 165 | End: 2024-04-17
Attending: STUDENT IN AN ORGANIZED HEALTH CARE EDUCATION/TRAINING PROGRAM | Admitting: STUDENT IN AN ORGANIZED HEALTH CARE EDUCATION/TRAINING PROGRAM
Payer: MEDICARE

## 2024-04-15 DIAGNOSIS — R91.1 RIGHT MIDDLE LOBE PULMONARY NODULE: Primary | ICD-10-CM

## 2024-04-15 DIAGNOSIS — R91.1 PULMONARY NODULE: ICD-10-CM

## 2024-04-15 LAB
ABO + RH BLD: NORMAL
BLD GP AB SCN SERPL QL: NORMAL
DEPRECATED RDW RBC AUTO: 13.4 % (ref 12.4–15.4)
GLUCOSE BLD-MCNC: 88 MG/DL (ref 70–99)
HCG UR QL: NEGATIVE
HCT VFR BLD AUTO: 37.1 % (ref 36–48)
HGB BLD-MCNC: 12.5 G/DL (ref 12–16)
MCH RBC QN AUTO: 32.3 PG (ref 26–34)
MCHC RBC AUTO-ENTMCNC: 33.7 G/DL (ref 31–36)
MCV RBC AUTO: 95.9 FL (ref 80–100)
PERFORMED ON: NORMAL
PLATELET # BLD AUTO: 155 K/UL (ref 135–450)
PMV BLD AUTO: 10.3 FL (ref 5–10.5)
RBC # BLD AUTO: 3.87 M/UL (ref 4–5.2)
WBC # BLD AUTO: 12.5 K/UL (ref 4–11)

## 2024-04-15 PROCEDURE — S2900 ROBOTIC SURGICAL SYSTEM: HCPCS | Performed by: STUDENT IN AN ORGANIZED HEALTH CARE EDUCATION/TRAINING PROGRAM

## 2024-04-15 PROCEDURE — 2500000003 HC RX 250 WO HCPCS: Performed by: ANESTHESIOLOGY

## 2024-04-15 PROCEDURE — 85027 COMPLETE CBC AUTOMATED: CPT

## 2024-04-15 PROCEDURE — 88305 TISSUE EXAM BY PATHOLOGIST: CPT

## 2024-04-15 PROCEDURE — 94761 N-INVAS EAR/PLS OXIMETRY MLT: CPT

## 2024-04-15 PROCEDURE — 2000000000 HC ICU R&B

## 2024-04-15 PROCEDURE — 86900 BLOOD TYPING SEROLOGIC ABO: CPT

## 2024-04-15 PROCEDURE — 36415 COLL VENOUS BLD VENIPUNCTURE: CPT

## 2024-04-15 PROCEDURE — 88341 IMHCHEM/IMCYTCHM EA ADD ANTB: CPT

## 2024-04-15 PROCEDURE — 51798 US URINE CAPACITY MEASURE: CPT

## 2024-04-15 PROCEDURE — 88307 TISSUE EXAM BY PATHOLOGIST: CPT

## 2024-04-15 PROCEDURE — 6370000000 HC RX 637 (ALT 250 FOR IP): Performed by: STUDENT IN AN ORGANIZED HEALTH CARE EDUCATION/TRAINING PROGRAM

## 2024-04-15 PROCEDURE — C9290 INJ, BUPIVACAINE LIPOSOME: HCPCS | Performed by: STUDENT IN AN ORGANIZED HEALTH CARE EDUCATION/TRAINING PROGRAM

## 2024-04-15 PROCEDURE — 6360000002 HC RX W HCPCS: Performed by: STUDENT IN AN ORGANIZED HEALTH CARE EDUCATION/TRAINING PROGRAM

## 2024-04-15 PROCEDURE — C1889 IMPLANT/INSERT DEVICE, NOC: HCPCS | Performed by: STUDENT IN AN ORGANIZED HEALTH CARE EDUCATION/TRAINING PROGRAM

## 2024-04-15 PROCEDURE — 07B74ZZ EXCISION OF THORAX LYMPHATIC, PERCUTANEOUS ENDOSCOPIC APPROACH: ICD-10-PCS | Performed by: STUDENT IN AN ORGANIZED HEALTH CARE EDUCATION/TRAINING PROGRAM

## 2024-04-15 PROCEDURE — 86850 RBC ANTIBODY SCREEN: CPT

## 2024-04-15 PROCEDURE — 3700000001 HC ADD 15 MINUTES (ANESTHESIA): Performed by: STUDENT IN AN ORGANIZED HEALTH CARE EDUCATION/TRAINING PROGRAM

## 2024-04-15 PROCEDURE — 94669 MECHANICAL CHEST WALL OSCILL: CPT

## 2024-04-15 PROCEDURE — 3700000000 HC ANESTHESIA ATTENDED CARE: Performed by: STUDENT IN AN ORGANIZED HEALTH CARE EDUCATION/TRAINING PROGRAM

## 2024-04-15 PROCEDURE — 0BTD4ZZ RESECTION OF RIGHT MIDDLE LUNG LOBE, PERCUTANEOUS ENDOSCOPIC APPROACH: ICD-10-PCS | Performed by: STUDENT IN AN ORGANIZED HEALTH CARE EDUCATION/TRAINING PROGRAM

## 2024-04-15 PROCEDURE — 88342 IMHCHEM/IMCYTCHM 1ST ANTB: CPT

## 2024-04-15 PROCEDURE — 2720000010 HC SURG SUPPLY STERILE: Performed by: STUDENT IN AN ORGANIZED HEALTH CARE EDUCATION/TRAINING PROGRAM

## 2024-04-15 PROCEDURE — 88309 TISSUE EXAM BY PATHOLOGIST: CPT

## 2024-04-15 PROCEDURE — 3E0T3BZ INTRODUCTION OF ANESTHETIC AGENT INTO PERIPHERAL NERVES AND PLEXI, PERCUTANEOUS APPROACH: ICD-10-PCS | Performed by: STUDENT IN AN ORGANIZED HEALTH CARE EDUCATION/TRAINING PROGRAM

## 2024-04-15 PROCEDURE — 3600000019 HC SURGERY ROBOT ADDTL 15MIN: Performed by: STUDENT IN AN ORGANIZED HEALTH CARE EDUCATION/TRAINING PROGRAM

## 2024-04-15 PROCEDURE — 2709999900 HC NON-CHARGEABLE SUPPLY: Performed by: STUDENT IN AN ORGANIZED HEALTH CARE EDUCATION/TRAINING PROGRAM

## 2024-04-15 PROCEDURE — 3600000009 HC SURGERY ROBOT BASE: Performed by: STUDENT IN AN ORGANIZED HEALTH CARE EDUCATION/TRAINING PROGRAM

## 2024-04-15 PROCEDURE — 86901 BLOOD TYPING SEROLOGIC RH(D): CPT

## 2024-04-15 PROCEDURE — 2700000000 HC OXYGEN THERAPY PER DAY

## 2024-04-15 PROCEDURE — 84703 CHORIONIC GONADOTROPIN ASSAY: CPT

## 2024-04-15 PROCEDURE — 6360000002 HC RX W HCPCS: Performed by: NURSE ANESTHETIST, CERTIFIED REGISTERED

## 2024-04-15 PROCEDURE — 94640 AIRWAY INHALATION TREATMENT: CPT

## 2024-04-15 PROCEDURE — 2580000003 HC RX 258: Performed by: ANESTHESIOLOGY

## 2024-04-15 PROCEDURE — C2618 PROBE/NEEDLE, CRYO: HCPCS | Performed by: STUDENT IN AN ORGANIZED HEALTH CARE EDUCATION/TRAINING PROGRAM

## 2024-04-15 PROCEDURE — 2580000003 HC RX 258: Performed by: STUDENT IN AN ORGANIZED HEALTH CARE EDUCATION/TRAINING PROGRAM

## 2024-04-15 PROCEDURE — 71045 X-RAY EXAM CHEST 1 VIEW: CPT

## 2024-04-15 PROCEDURE — 6360000002 HC RX W HCPCS: Performed by: NURSE PRACTITIONER

## 2024-04-15 PROCEDURE — 2500000003 HC RX 250 WO HCPCS: Performed by: NURSE ANESTHETIST, CERTIFIED REGISTERED

## 2024-04-15 PROCEDURE — 88312 SPECIAL STAINS GROUP 1: CPT

## 2024-04-15 PROCEDURE — 8E0W4CZ ROBOTIC ASSISTED PROCEDURE OF TRUNK REGION, PERCUTANEOUS ENDOSCOPIC APPROACH: ICD-10-PCS | Performed by: STUDENT IN AN ORGANIZED HEALTH CARE EDUCATION/TRAINING PROGRAM

## 2024-04-15 DEVICE — CLIP INT M L POLYMER LOK LIG HEM O LOK: Type: IMPLANTABLE DEVICE | Site: LUNG | Status: FUNCTIONAL

## 2024-04-15 RX ORDER — BISACODYL 5 MG/1
5 TABLET, DELAYED RELEASE ORAL DAILY PRN
Status: DISCONTINUED | OUTPATIENT
Start: 2024-04-15 | End: 2024-04-17 | Stop reason: HOSPADM

## 2024-04-15 RX ORDER — KETAMINE HCL IN NACL, ISO-OSM 100MG/10ML
SYRINGE (ML) INJECTION PRN
Status: DISCONTINUED | OUTPATIENT
Start: 2024-04-15 | End: 2024-04-15 | Stop reason: SDUPTHER

## 2024-04-15 RX ORDER — SODIUM CHLORIDE, SODIUM LACTATE, POTASSIUM CHLORIDE, CALCIUM CHLORIDE 600; 310; 30; 20 MG/100ML; MG/100ML; MG/100ML; MG/100ML
INJECTION, SOLUTION INTRAVENOUS CONTINUOUS
Status: DISCONTINUED | OUTPATIENT
Start: 2024-04-15 | End: 2024-04-15 | Stop reason: HOSPADM

## 2024-04-15 RX ORDER — NALOXONE HYDROCHLORIDE 0.4 MG/ML
INJECTION, SOLUTION INTRAMUSCULAR; INTRAVENOUS; SUBCUTANEOUS PRN
Status: DISCONTINUED | OUTPATIENT
Start: 2024-04-15 | End: 2024-04-15

## 2024-04-15 RX ORDER — ONDANSETRON 2 MG/ML
4 INJECTION INTRAMUSCULAR; INTRAVENOUS EVERY 30 MIN PRN
Status: DISCONTINUED | OUTPATIENT
Start: 2024-04-15 | End: 2024-04-15

## 2024-04-15 RX ORDER — SODIUM CHLORIDE 0.9 % (FLUSH) 0.9 %
5-40 SYRINGE (ML) INJECTION EVERY 12 HOURS SCHEDULED
Status: DISCONTINUED | OUTPATIENT
Start: 2024-04-15 | End: 2024-04-15 | Stop reason: HOSPADM

## 2024-04-15 RX ORDER — LIDOCAINE HYDROCHLORIDE 20 MG/ML
INJECTION, SOLUTION INFILTRATION; PERINEURAL PRN
Status: DISCONTINUED | OUTPATIENT
Start: 2024-04-15 | End: 2024-04-15 | Stop reason: SDUPTHER

## 2024-04-15 RX ORDER — POLYETHYLENE GLYCOL 3350 17 G/17G
17 POWDER, FOR SOLUTION ORAL DAILY
Status: DISCONTINUED | OUTPATIENT
Start: 2024-04-15 | End: 2024-04-17 | Stop reason: HOSPADM

## 2024-04-15 RX ORDER — SODIUM CHLORIDE 0.9 % (FLUSH) 0.9 %
5-40 SYRINGE (ML) INJECTION PRN
Status: DISCONTINUED | OUTPATIENT
Start: 2024-04-15 | End: 2024-04-15 | Stop reason: HOSPADM

## 2024-04-15 RX ORDER — SODIUM CHLORIDE 0.9 % (FLUSH) 0.9 %
5-40 SYRINGE (ML) INJECTION PRN
Status: DISCONTINUED | OUTPATIENT
Start: 2024-04-15 | End: 2024-04-17 | Stop reason: HOSPADM

## 2024-04-15 RX ORDER — PROPOFOL 10 MG/ML
INJECTION, EMULSION INTRAVENOUS PRN
Status: DISCONTINUED | OUTPATIENT
Start: 2024-04-15 | End: 2024-04-15 | Stop reason: SDUPTHER

## 2024-04-15 RX ORDER — ENOXAPARIN SODIUM 100 MG/ML
40 INJECTION SUBCUTANEOUS DAILY
Status: DISCONTINUED | OUTPATIENT
Start: 2024-04-16 | End: 2024-04-17 | Stop reason: HOSPADM

## 2024-04-15 RX ORDER — SODIUM CHLORIDE 0.9 % (FLUSH) 0.9 %
5-40 SYRINGE (ML) INJECTION EVERY 12 HOURS SCHEDULED
Status: DISCONTINUED | OUTPATIENT
Start: 2024-04-15 | End: 2024-04-17 | Stop reason: HOSPADM

## 2024-04-15 RX ORDER — LEVALBUTEROL 1.25 MG/.5ML
1.25 SOLUTION, CONCENTRATE RESPIRATORY (INHALATION) EVERY 4 HOURS
Status: DISCONTINUED | OUTPATIENT
Start: 2024-04-15 | End: 2024-04-16

## 2024-04-15 RX ORDER — SODIUM CHLORIDE 9 MG/ML
INJECTION, SOLUTION INTRAVENOUS PRN
Status: DISCONTINUED | OUTPATIENT
Start: 2024-04-15 | End: 2024-04-17 | Stop reason: HOSPADM

## 2024-04-15 RX ORDER — ACETYLCYSTEINE 200 MG/ML
600 SOLUTION ORAL; RESPIRATORY (INHALATION) 4 TIMES DAILY
Status: DISCONTINUED | OUTPATIENT
Start: 2024-04-15 | End: 2024-04-17 | Stop reason: HOSPADM

## 2024-04-15 RX ORDER — AMLODIPINE BESYLATE 5 MG/1
5 TABLET ORAL DAILY
Status: DISCONTINUED | OUTPATIENT
Start: 2024-04-15 | End: 2024-04-17 | Stop reason: HOSPADM

## 2024-04-15 RX ORDER — LIDOCAINE HYDROCHLORIDE 10 MG/ML
1 INJECTION, SOLUTION EPIDURAL; INFILTRATION; INTRACAUDAL; PERINEURAL
Status: DISCONTINUED | OUTPATIENT
Start: 2024-04-15 | End: 2024-04-15 | Stop reason: HOSPADM

## 2024-04-15 RX ORDER — ROCURONIUM BROMIDE 10 MG/ML
INJECTION, SOLUTION INTRAVENOUS PRN
Status: DISCONTINUED | OUTPATIENT
Start: 2024-04-15 | End: 2024-04-15 | Stop reason: SDUPTHER

## 2024-04-15 RX ORDER — ESOMEPRAZOLE MAGNESIUM 20 MG/1
20 GRANULE, DELAYED RELEASE ORAL DAILY
Status: DISCONTINUED | OUTPATIENT
Start: 2024-04-15 | End: 2024-04-15

## 2024-04-15 RX ORDER — OXYCODONE HYDROCHLORIDE 5 MG/1
10 TABLET ORAL PRN
Status: DISCONTINUED | OUTPATIENT
Start: 2024-04-15 | End: 2024-04-15

## 2024-04-15 RX ORDER — NOREPINEPHRINE BITARTRATE 1 MG/ML
INJECTION, SOLUTION INTRAVENOUS PRN
Status: DISCONTINUED | OUTPATIENT
Start: 2024-04-15 | End: 2024-04-15 | Stop reason: SDUPTHER

## 2024-04-15 RX ORDER — MORPHINE SULFATE 2 MG/ML
2 INJECTION, SOLUTION INTRAMUSCULAR; INTRAVENOUS
Status: DISCONTINUED | OUTPATIENT
Start: 2024-04-15 | End: 2024-04-17

## 2024-04-15 RX ORDER — METHOCARBAMOL 750 MG/1
750 TABLET, FILM COATED ORAL EVERY 8 HOURS PRN
Status: DISCONTINUED | OUTPATIENT
Start: 2024-04-15 | End: 2024-04-17 | Stop reason: HOSPADM

## 2024-04-15 RX ORDER — SODIUM CHLORIDE 9 MG/ML
INJECTION, SOLUTION INTRAVENOUS PRN
Status: DISCONTINUED | OUTPATIENT
Start: 2024-04-15 | End: 2024-04-15 | Stop reason: HOSPADM

## 2024-04-15 RX ORDER — SODIUM CHLORIDE 9 MG/ML
INJECTION, SOLUTION INTRAVENOUS PRN
Status: DISCONTINUED | OUTPATIENT
Start: 2024-04-15 | End: 2024-04-15

## 2024-04-15 RX ORDER — FAMOTIDINE 10 MG/ML
20 INJECTION, SOLUTION INTRAVENOUS 2 TIMES DAILY
Status: DISCONTINUED | OUTPATIENT
Start: 2024-04-15 | End: 2024-04-17 | Stop reason: HOSPADM

## 2024-04-15 RX ORDER — SODIUM CHLORIDE 0.9 % (FLUSH) 0.9 %
5-40 SYRINGE (ML) INJECTION EVERY 12 HOURS SCHEDULED
Status: DISCONTINUED | OUTPATIENT
Start: 2024-04-15 | End: 2024-04-15

## 2024-04-15 RX ORDER — MIDAZOLAM HYDROCHLORIDE 1 MG/ML
2 INJECTION INTRAMUSCULAR; INTRAVENOUS
Status: DISCONTINUED | OUTPATIENT
Start: 2024-04-15 | End: 2024-04-15 | Stop reason: HOSPADM

## 2024-04-15 RX ORDER — FENTANYL CITRATE 50 UG/ML
INJECTION, SOLUTION INTRAMUSCULAR; INTRAVENOUS PRN
Status: DISCONTINUED | OUTPATIENT
Start: 2024-04-15 | End: 2024-04-15 | Stop reason: SDUPTHER

## 2024-04-15 RX ORDER — OXYCODONE HYDROCHLORIDE 5 MG/1
5 TABLET ORAL PRN
Status: DISCONTINUED | OUTPATIENT
Start: 2024-04-15 | End: 2024-04-15

## 2024-04-15 RX ORDER — DEXAMETHASONE SODIUM PHOSPHATE 10 MG/ML
INJECTION INTRAMUSCULAR; INTRAVENOUS PRN
Status: DISCONTINUED | OUTPATIENT
Start: 2024-04-15 | End: 2024-04-15 | Stop reason: SDUPTHER

## 2024-04-15 RX ORDER — DIPHENHYDRAMINE HCL 25 MG
25 TABLET ORAL NIGHTLY PRN
Status: DISCONTINUED | OUTPATIENT
Start: 2024-04-15 | End: 2024-04-17 | Stop reason: HOSPADM

## 2024-04-15 RX ORDER — ALBUTEROL SULFATE 2.5 MG/3ML
2.5 SOLUTION RESPIRATORY (INHALATION)
Status: DISCONTINUED | OUTPATIENT
Start: 2024-04-15 | End: 2024-04-15

## 2024-04-15 RX ORDER — ONDANSETRON 2 MG/ML
4 INJECTION INTRAMUSCULAR; INTRAVENOUS EVERY 6 HOURS PRN
Status: DISCONTINUED | OUTPATIENT
Start: 2024-04-15 | End: 2024-04-17 | Stop reason: HOSPADM

## 2024-04-15 RX ORDER — SODIUM CHLORIDE 0.9 % (FLUSH) 0.9 %
5-40 SYRINGE (ML) INJECTION PRN
Status: DISCONTINUED | OUTPATIENT
Start: 2024-04-15 | End: 2024-04-15

## 2024-04-15 RX ORDER — LEVALBUTEROL TARTRATE 45 UG/1
2 AEROSOL, METERED ORAL EVERY 4 HOURS PRN
Status: DISCONTINUED | OUTPATIENT
Start: 2024-04-15 | End: 2024-04-15

## 2024-04-15 RX ORDER — ONDANSETRON 2 MG/ML
INJECTION INTRAMUSCULAR; INTRAVENOUS PRN
Status: DISCONTINUED | OUTPATIENT
Start: 2024-04-15 | End: 2024-04-15 | Stop reason: SDUPTHER

## 2024-04-15 RX ORDER — GABAPENTIN 300 MG/1
300 CAPSULE ORAL 3 TIMES DAILY
Status: DISCONTINUED | OUTPATIENT
Start: 2024-04-15 | End: 2024-04-17 | Stop reason: HOSPADM

## 2024-04-15 RX ORDER — ATORVASTATIN CALCIUM 10 MG/1
10 TABLET, FILM COATED ORAL NIGHTLY
Status: DISCONTINUED | OUTPATIENT
Start: 2024-04-15 | End: 2024-04-17 | Stop reason: HOSPADM

## 2024-04-15 RX ORDER — GINSENG 100 MG
CAPSULE ORAL DAILY
Status: DISCONTINUED | OUTPATIENT
Start: 2024-04-15 | End: 2024-04-17 | Stop reason: HOSPADM

## 2024-04-15 RX ORDER — FAMOTIDINE 20 MG/1
20 TABLET, FILM COATED ORAL 2 TIMES DAILY
Status: DISCONTINUED | OUTPATIENT
Start: 2024-04-15 | End: 2024-04-17 | Stop reason: HOSPADM

## 2024-04-15 RX ORDER — MORPHINE SULFATE 4 MG/ML
4 INJECTION, SOLUTION INTRAMUSCULAR; INTRAVENOUS
Status: DISCONTINUED | OUTPATIENT
Start: 2024-04-15 | End: 2024-04-17

## 2024-04-15 RX ORDER — ACETAMINOPHEN 500 MG
1000 TABLET ORAL ONCE
Status: COMPLETED | OUTPATIENT
Start: 2024-04-15 | End: 2024-04-15

## 2024-04-15 RX ORDER — FAMOTIDINE 10 MG/ML
20 INJECTION, SOLUTION INTRAVENOUS ONCE
Status: COMPLETED | OUTPATIENT
Start: 2024-04-15 | End: 2024-04-15

## 2024-04-15 RX ORDER — CEFAZOLIN SODIUM IN 0.9 % NACL 2 G/100 ML
2000 PLASTIC BAG, INJECTION (ML) INTRAVENOUS
Status: COMPLETED | OUTPATIENT
Start: 2024-04-15 | End: 2024-04-15

## 2024-04-15 RX ORDER — PANTOPRAZOLE SODIUM 40 MG/1
40 TABLET, DELAYED RELEASE ORAL
Status: DISCONTINUED | OUTPATIENT
Start: 2024-04-15 | End: 2024-04-17 | Stop reason: HOSPADM

## 2024-04-15 RX ORDER — HYDROCHLOROTHIAZIDE 25 MG/1
12.5 TABLET ORAL DAILY
Status: DISCONTINUED | OUTPATIENT
Start: 2024-04-15 | End: 2024-04-17 | Stop reason: HOSPADM

## 2024-04-15 RX ADMIN — ONDANSETRON 4 MG: 2 INJECTION INTRAMUSCULAR; INTRAVENOUS at 10:54

## 2024-04-15 RX ADMIN — Medication 10 MG: at 08:26

## 2024-04-15 RX ADMIN — ATORVASTATIN CALCIUM 10 MG: 10 TABLET, FILM COATED ORAL at 20:59

## 2024-04-15 RX ADMIN — ACETYLCYSTEINE 600 MG: 200 INHALANT RESPIRATORY (INHALATION) at 19:53

## 2024-04-15 RX ADMIN — SODIUM CHLORIDE, SODIUM LACTATE, POTASSIUM CHLORIDE, AND CALCIUM CHLORIDE: .6; .31; .03; .02 INJECTION, SOLUTION INTRAVENOUS at 07:43

## 2024-04-15 RX ADMIN — FAMOTIDINE 20 MG: 10 INJECTION, SOLUTION INTRAVENOUS at 06:42

## 2024-04-15 RX ADMIN — NOREPINEPHRINE BITARTRATE 4 MCG: 1 INJECTION INTRAVENOUS at 09:24

## 2024-04-15 RX ADMIN — NOREPINEPHRINE BITARTRATE 4 MCG: 1 INJECTION INTRAVENOUS at 10:09

## 2024-04-15 RX ADMIN — HYDROCHLOROTHIAZIDE 12.5 MG: 25 TABLET ORAL at 14:37

## 2024-04-15 RX ADMIN — ROCURONIUM BROMIDE 20 MG: 50 INJECTION, SOLUTION INTRAVENOUS at 09:33

## 2024-04-15 RX ADMIN — NOREPINEPHRINE BITARTRATE 4 MCG: 1 INJECTION INTRAVENOUS at 09:16

## 2024-04-15 RX ADMIN — ROCURONIUM BROMIDE 70 MG: 50 INJECTION, SOLUTION INTRAVENOUS at 07:48

## 2024-04-15 RX ADMIN — Medication 10 ML: at 21:00

## 2024-04-15 RX ADMIN — ALBUTEROL SULFATE 2.5 MG: 2.5 SOLUTION RESPIRATORY (INHALATION) at 11:55

## 2024-04-15 RX ADMIN — NOREPINEPHRINE BITARTRATE 6 MCG: 1 INJECTION INTRAVENOUS at 09:12

## 2024-04-15 RX ADMIN — LEVALBUTEROL 1.25 MG: 1.25 SOLUTION, CONCENTRATE RESPIRATORY (INHALATION) at 19:53

## 2024-04-15 RX ADMIN — METHOCARBAMOL 750 MG: 750 TABLET ORAL at 18:10

## 2024-04-15 RX ADMIN — Medication 10 MG: at 09:51

## 2024-04-15 RX ADMIN — NOREPINEPHRINE BITARTRATE 4 MCG: 1 INJECTION INTRAVENOUS at 10:20

## 2024-04-15 RX ADMIN — FAMOTIDINE 20 MG: 20 TABLET, FILM COATED ORAL at 20:59

## 2024-04-15 RX ADMIN — PROPOFOL 30 MG: 10 INJECTION, EMULSION INTRAVENOUS at 08:20

## 2024-04-15 RX ADMIN — LIDOCAINE HYDROCHLORIDE 80 MG: 20 INJECTION, SOLUTION INFILTRATION; PERINEURAL at 07:48

## 2024-04-15 RX ADMIN — Medication 2 PUFF: at 19:53

## 2024-04-15 RX ADMIN — Medication 2 PUFF: at 11:59

## 2024-04-15 RX ADMIN — PANTOPRAZOLE SODIUM 40 MG: 40 TABLET, DELAYED RELEASE ORAL at 14:37

## 2024-04-15 RX ADMIN — PROPOFOL 130 MG: 10 INJECTION, EMULSION INTRAVENOUS at 07:48

## 2024-04-15 RX ADMIN — NOREPINEPHRINE BITARTRATE 4 MCG: 1 INJECTION INTRAVENOUS at 10:00

## 2024-04-15 RX ADMIN — GABAPENTIN 300 MG: 300 CAPSULE ORAL at 21:00

## 2024-04-15 RX ADMIN — LEVALBUTEROL 1.25 MG: 1.25 SOLUTION, CONCENTRATE RESPIRATORY (INHALATION) at 23:22

## 2024-04-15 RX ADMIN — ACETYLCYSTEINE 600 MG: 200 INHALANT RESPIRATORY (INHALATION) at 15:43

## 2024-04-15 RX ADMIN — MORPHINE SULFATE 4 MG: 4 INJECTION, SOLUTION INTRAMUSCULAR; INTRAVENOUS at 12:52

## 2024-04-15 RX ADMIN — NOREPINEPHRINE BITARTRATE 4 MCG: 1 INJECTION INTRAVENOUS at 09:49

## 2024-04-15 RX ADMIN — AMLODIPINE BESYLATE 5 MG: 5 TABLET ORAL at 14:37

## 2024-04-15 RX ADMIN — PROPOFOL 40 MG: 10 INJECTION, EMULSION INTRAVENOUS at 08:41

## 2024-04-15 RX ADMIN — DEXAMETHASONE SODIUM PHOSPHATE 8 MG: 10 INJECTION INTRAMUSCULAR; INTRAVENOUS at 08:00

## 2024-04-15 RX ADMIN — FENTANYL CITRATE 50 MCG: 50 INJECTION, SOLUTION INTRAMUSCULAR; INTRAVENOUS at 07:45

## 2024-04-15 RX ADMIN — NOREPINEPHRINE BITARTRATE 8 MCG: 1 INJECTION INTRAVENOUS at 08:40

## 2024-04-15 RX ADMIN — LEVALBUTEROL 1.25 MG: 1.25 SOLUTION, CONCENTRATE RESPIRATORY (INHALATION) at 15:43

## 2024-04-15 RX ADMIN — BACITRACIN: 500 OINTMENT TOPICAL at 14:37

## 2024-04-15 RX ADMIN — SODIUM CHLORIDE, SODIUM LACTATE, POTASSIUM CHLORIDE, AND CALCIUM CHLORIDE: .6; .31; .03; .02 INJECTION, SOLUTION INTRAVENOUS at 11:07

## 2024-04-15 RX ADMIN — NOREPINEPHRINE BITARTRATE 4 MCG: 1 INJECTION INTRAVENOUS at 09:39

## 2024-04-15 RX ADMIN — ROCURONIUM BROMIDE 30 MG: 50 INJECTION, SOLUTION INTRAVENOUS at 08:34

## 2024-04-15 RX ADMIN — TIOTROPIUM BROMIDE INHALATION SPRAY 2 PUFF: 3.12 SPRAY, METERED RESPIRATORY (INHALATION) at 11:57

## 2024-04-15 RX ADMIN — NOREPINEPHRINE BITARTRATE 4 MCG: 1 INJECTION INTRAVENOUS at 10:29

## 2024-04-15 RX ADMIN — SUGAMMADEX 200 MG: 100 INJECTION, SOLUTION INTRAVENOUS at 11:12

## 2024-04-15 RX ADMIN — ROCURONIUM BROMIDE 30 MG: 50 INJECTION, SOLUTION INTRAVENOUS at 10:16

## 2024-04-15 RX ADMIN — ACETAMINOPHEN 1000 MG: 500 TABLET ORAL at 06:42

## 2024-04-15 RX ADMIN — Medication 2000 MG: at 08:10

## 2024-04-15 RX ADMIN — ROCURONIUM BROMIDE 30 MG: 50 INJECTION, SOLUTION INTRAVENOUS at 08:10

## 2024-04-15 RX ADMIN — FENTANYL CITRATE 50 MCG: 50 INJECTION, SOLUTION INTRAMUSCULAR; INTRAVENOUS at 10:47

## 2024-04-15 RX ADMIN — GABAPENTIN 300 MG: 300 CAPSULE ORAL at 12:51

## 2024-04-15 ASSESSMENT — PAIN SCALES - GENERAL
PAINLEVEL_OUTOF10: 0
PAINLEVEL_OUTOF10: 5
PAINLEVEL_OUTOF10: 5
PAINLEVEL_OUTOF10: 9

## 2024-04-15 ASSESSMENT — PAIN DESCRIPTION - LOCATION: LOCATION: HEAD

## 2024-04-15 ASSESSMENT — COPD QUESTIONNAIRES: CAT_SEVERITY: SEVERE

## 2024-04-15 ASSESSMENT — PAIN DESCRIPTION - DESCRIPTORS: DESCRIPTORS: THROBBING;TENDER

## 2024-04-15 NOTE — ACP (ADVANCE CARE PLANNING)
Advance Care Planning     General Advance Care Planning (ACP) Conversation    Date of Conversation: 4/15/2024  Conducted with: Patient with Decision Making Capacity   Healthcare Decision Maker: Named in Advance Directive or Healthcare Power of  (name) copy at home-  spouse and daughter Shruti    Healthcare Decision Maker:    Primary Decision Maker: Manohar Thrasher - Spouse - [phone]    Secondary Decision Maker: Shruti Thrasher - Child - [phone]  Click here to complete Healthcare Decision Makers including selection of the Healthcare Decision Maker Relationship (ie \"Primary\").   Today we documented Decision Maker(s) consistent with Legal Next of Kin hierarchy.    Content/Action Overview:  Has ACP document(s) NOT on file - requested patient to provide  Reviewed DNR/DNI and patient elects Full Code (Attempt Resuscitation)        Length of Voluntary ACP Conversation in minutes:  <16 minutes (Non-Billable)    Prerna Crocker RN

## 2024-04-15 NOTE — H&P
Consultation H&P           Date of Admission:  No admission date for patient encounter.  Date of Consultation:  3/14/2024     PCP:  Rei Velarde MD    Referred             Chief Complaint: RML pulm nodule      History of Present Illness:   We are asked to see this patient in consultation by Dr. Cool regarding RML pulm nodule.   Cristy Thrasher is a 68 y.o. female with PMH of HTN, hiatal hernia, arthritis, cataracts, hx tobacco abuse (quit 2 weeks ago, was smoking 1 pk/day; on Chantix) who presents with RML pulm nodule. PET avid. Denies SOB, CP. We were consulted for consideration of surgical intervention vs navigation bronch biopsy.      Past Medical History:  Past Medical History        Past Medical History:   Diagnosis Date    Arthritis      Cataract      Hiatal hernia      Hypertension              Past Surgical History:  Past Surgical History         Past Surgical History:   Procedure Laterality Date    BREAST LUMPECTOMY   1984    BREAST LUMPECTOMY   1985    CATARACT EXTRACTION        COLONOSCOPY   2022    COLONOSCOPY   2014    GALLBLADDER SURGERY   2005    HYSTERECTOMY (CERVIX STATUS UNKNOWN)   1980    SMALL INTESTINE SURGERY   1971    UTERUS SURGERY   1978     cyst removal            Home Medications:   Home Medications           Prior to Admission medications    Medication Sig Start Date End Date Taking? Authorizing Provider   levalbuterol (XOPENEX HFA) 45 MCG/ACT inhaler Inhale 2 puffs into the lungs every 4 hours as needed for Wheezing 2/28/24 2/27/25 Yes James Cool MD   amLODIPine (NORVASC) 5 MG tablet Take 1 tablet by mouth daily     Yes ProviderMark MD   hydroCHLOROthiazide 12.5 MG tablet Take 1 tablet by mouth daily 12/16/23   Yes ProviderMark MD   atorvastatin (LIPITOR) 10 MG tablet Take 1 tablet by mouth nightly     Yes ProviderMark MD   fluticasone-umeclidin-vilant (TRELEGY ELLIPTA) 100-62.5-25 MCG/ACT AEPB inhaler Inhale 1 puff into the lungs daily 2/21/24   Yes  clubbing or petechiae.  Skin: no dermatitis or ulceration. No nodularity or induration.  Neuro: CN grossly intact. Sensation and motor function grossly intact.  Psychiatric: oriented, appropriate mood/affect.        MEDICAL DECISION MAKING/TESTING  Studies personally reviewed.     PET/CT: 2/23/24  FINDINGS:  HEAD/NECK: No metabolically active cervical lymphadenopathy.     CHEST: Focal activity associated with a right middle lobe nodule measuring  approximately 1 cm, SUV max of 2.4.     No metabolically active axillary, hilar, or mediastinal lymphadenopathy.     ABDOMEN/PELVIS: No metabolically active intraperitoneal mass.  No  metabolically active abdominal or pelvic lymphadenopathy.  Physiologic  activity in the gastrointestinal and genitourinary systems.     BONES/SOFT TISSUE: No abnormal FDG activity localizes to the bones.  No  aggressive osseous lesion.     INCIDENTAL CT FINDINGS: Atherosclerotic calcifications within the coronary  arteries and the aorta and its branches.  Evidence of prior granulomatous  disease.  The gallbladder has been removed.  Colonic diverticulosis without  evidence of acute diverticulitis.  Status post hysterectomy.     IMPRESSION:  1.  Focal activity associated with a right middle lobe pulmonary nodule.  This could potentially be due to benign or malignant etiologies including  primary lung cancer or an infectious or inflammatory nodule.  Consider biopsy  at this time.  Outside CT scan has been requested for direct comparison.  An  addendum will be made to this report when prior images are available.        PFT: 3/11/24  DLCO 81  FEV1 105  FEV1/             Labs:   CBC: No results for input(s): \"WBC\", \"HGB\", \"HCT\", \"MCV\", \"PLT\" in the last 72 hours.  BMP: No results for input(s): \"NA\", \"K\", \"CL\", \"CO2\", \"PHOS\", \"BUN\", \"CREATININE\", \"CALCIUM\", \"MG\" in the last 72 hours.  Cardiac Enzymes: No results for input(s): \"CKTOTAL\", \"CKMB\", \"CKMBINDEX\", \"TROPONINI\" in the last 72

## 2024-04-15 NOTE — PROGRESS NOTES
Shift: [id number]    Admitting diagnosis: Pulmonary Nodule    Presentation to hospital: VATS    Surgery: yes - 4/15     Nursing assessment at handoff  stable    Emergency Contact/POA:Manohar Thrasher  Family updated: yes - visited    Most recent vitals: /63   Pulse 93   Temp 97.3 °F (36.3 °C) (Oral)   Resp 25   Ht 1.626 m (5' 4\")   Wt 66.7 kg (147 lb)   SpO2 99%   BMI 25.23 kg/m²      Rhythm: Normal Sinus Rhythm 80s     NC/HFNC- 3 lpm  Respiratory support: - No ventilator support    Vent days: Day n/a    Increased O2 requirements: no    Admission weight Weight - Scale: 66.7 kg (147 lb)  Today's weight   Wt Readings from Last 1 Encounters:   04/03/24 66.7 kg (147 lb)         UOP >30ml/hr: no, bladder scan 345mL, straight cath 250mL     Hickman need assessed each shift: no    Restraints: no  Order current and documentation up to date?    Lines/Drains  LDA Insertion Date Discontinued Date Dressing Changes   PIV   X2 4/15     TLC       Arterial   4/15     Hickman       Vas Cath      ETT       Surgical drains        Night Shift Hospitalist Interventions    Problem(Brief) Date Time Intervention Physician contacted                                               Drip rates at handoff:    sodium chloride         Hospital Course Daily Updates:  Admit Day# 0 04/15/24  -VATS R middle lobectomy & lymph node dissection  -Admitted to ICU      Lab Data:   CBC:   Recent Labs     04/15/24  1152   WBC 12.5*   HGB 12.5   HCT 37.1   MCV 95.9        BMP:  No results for input(s): \"NA\", \"K\", \"CO2\", \"BUN\", \"CREATININE\", \"CA\" in the last 72 hours.    Invalid input(s): \"MAGNESIUM\"  LIVR: No results for input(s): \"AST\", \"ALT\" in the last 72 hours.  PT/INR: No results for input(s): \"PROT\", \"INR\" in the last 72 hours.  APTT: No results for input(s): \"APTT\" in the last 72 hours.  ABG: No results for input(s): \"PHART\", \"[id number]\", \"PO2ART\" in the last 72 hours.  Consults (if GI or Nephrology- which group?)-  none

## 2024-04-15 NOTE — OP NOTE
Operative Note      Patient: Cristy Thrasher  YOB: 1955  MRN: [id number]    Date of Procedure: 4/15/2024    Pre-Op Diagnosis Codes:     * Pulmonary nodule [R91.1]    Post-Op Diagnosis: Same       Procedure(s):  RIGHT ROBOTIC MIDDLE LOBE  LOBECTOMY WITH LYMPH NODE DISSECTION WITH CRYOABLATION NERVE BLOCK LEVELS 5-8; INTERCOSTAL NERVE BLOCK LEVELS 5-8    Specimen: Right Middle lobe  Lymph nodes: 2R, 4R, 7, 9R, 11R    Surgeon(s):  Jose Manuel Amezcua MD Seto, Lynn S, MD    Assistant:   First Assistant: Nya Greco RN    Anesthesia: General    Estimated Blood Loss (mL): less than 100     Complications: None    Specimens:   ID Type Source Tests Collected by Time Destination   A : 11R LYMPH NODE Tissue Tissue SURGICAL PATHOLOGY Jose Manuel Amezcua MD 4/15/2024 0923    B : LYMPH NODE 9R Tissue Lymph Node SURGICAL PATHOLOGY Jose Manuel Amezcua MD 4/15/2024 1012    C : LYMPH NODE 7 Tissue Lymph Node SURGICAL PATHOLOGY Jose Manuel Amezcua MD 4/15/2024 1023    D : LYMPH NODE 4R Tissue Lymph Node SURGICAL PATHOLOGY Jose Manuel Amezcua MD 4/15/2024 1035    E : LYMPH NODE 2R Tissue Lymph Node SURGICAL PATHOLOGY Jose Manuel Amezcua MD 4/15/2024 1036    F : RIGHT MIDDLE LOBE Tissue Tissue SURGICAL PATHOLOGY Jose Manuel Amezcua MD 4/15/2024 1042        Implants:  Implant Name Type Inv. Item Serial No.  Lot No. LRB No. Used Action   CLIP INT M L POLYMER HUDSON LIG HEM O HUDSON - [id number]  CLIP INT M L POLYMER HUDSON LIG HEM O HUDSON  [employer]  Right 1 Implanted         Drains:   Urinary Catheter 04/15/24 2 Way (Active)       Findings:  Infection Present At Time Of Surgery (PATOS) (choose all levels that have infection present):  No infection present  Other Findings: large nodes at level 7 and 11R    Detailed Description of Procedure:       This case did not have a cancer diagnosis but was treated as such. Based off that it was performed with a curative intent.    After informed consent was obtained, the patient was

## 2024-04-15 NOTE — ANESTHESIA POSTPROCEDURE EVALUATION
Department of Anesthesiology  Postprocedure Note    Patient: Cristy Thrasher  MRN: [id number]  YOB: 1955  Date of evaluation: 4/15/2024    Procedure Summary       Date: 04/15/24 Room / Location: [address]    Anesthesia Start: 0743 Anesthesia Stop: 1135    Procedure: RIGHT ROBOTIC MIDDLE LOBE  LOBECTOMY WITH LYMPH NODE DISSECTION WITH CRYOABLATION NERVE BLOCK LEVELS 5-8; INTERCOSTAL NERVE BLOCK LEVELS 5-8 (Right: Chest) Diagnosis:       Pulmonary nodule      (Pulmonary nodule [R91.1])    Surgeons: Jose Manuel Amezcua MD Responsible Provider: Rashad Hogan MD    Anesthesia Type: general ASA Status: 3            Anesthesia Type: No value filed.    Elizabeth Phase I: Elizabeth Score: 10    Elizabeth Phase II:      Anesthesia Post Evaluation    Patient location during evaluation: PACU  Level of consciousness: awake  Airway patency: patent  Nausea & Vomiting: no vomiting  Cardiovascular status: blood pressure returned to baseline  Respiratory status: acceptable  Hydration status: stable  Pain management: adequate    No notable events documented.

## 2024-04-15 NOTE — ANESTHESIA PRE PROCEDURE
Department of Anesthesiology  Preprocedure Note       Name:  Cristy Thrasher   Age:  68 y.o.  :  1955                                          MRN:  [id number]         Date:  4/15/2024      Surgeon: Surgeon(s):  Jose Manuel Amezcua MD Seto, Lynn S, MD    Procedure: Procedure(s):  RIGHT ROBOTIC MIDDLE LOBE  LOBECTOMY WITH LYMPH NODE DISSECTION  NOTE: CRYO NERVE BLOCK    Medications prior to admission:   Prior to Admission medications    Medication Sig Start Date End Date Taking? Authorizing Provider   Multiple Vitamins-Minerals (THERAPEUTIC MULTIVITAMIN-MINERALS) tablet Take 1 tablet by mouth daily   Yes Mark Odom MD   esomeprazole Magnesium (NEXIUM) 20 MG PACK Take 1 packet by mouth daily    Mark Odom MD   gabapentin (NEURONTIN) 300 MG capsule Take 1 capsule by mouth 3 times daily for 7 days. Please start on 2024 4/8/24 4/15/24  Jose Manuel Amezcua MD   levalbuterol (XOPENEX HFA) 45 MCG/ACT inhaler Inhale 2 puffs into the lungs every 4 hours as needed for Wheezing 24  James Cool MD   amLODIPine (NORVASC) 5 MG tablet Take 1 tablet by mouth daily    Mark Odom MD   hydroCHLOROthiazide 12.5 MG tablet Take 1 tablet by mouth daily 23   Mark Odom MD   atorvastatin (LIPITOR) 10 MG tablet Take 1 tablet by mouth nightly    Mark Odom MD   diphenhydrAMINE (BENADRYL) 25 MG capsule Take 1 capsule by mouth nightly as needed    Mark Odom MD   polyethylene glycol (GLYCOLAX) 17 GM/SCOOP powder Take 17 g by mouth daily    Mark Odom MD   Simethicone (GAS-X EXTRA STRENGTH) 125 MG CAPS Take 2 capsules by mouth as needed    Mark Odom MD   UNABLE TO FIND AIM Herbal Fiberblend    Mark Oodm MD   fluticasone-umeclidin-vilant (TRELEGY ELLIPTA) 100-62.5-25 MCG/ACT AEPB inhaler Inhale 1 puff into the lungs daily 24   James Cool MD       Current medications:    Current Facility-Administered

## 2024-04-15 NOTE — PROGRESS NOTES
Patient admitted to [facility] 1 in preparation for surgery, VSS. Consent confirmed. IV inserted into LFA, LR infusing. Belongings clothing, shoes, shirt, pants, purse, medications, glasses and upper and lower dentures. NPO since 2100. Family at bedside, phone number in system for text updates, call light within reach.

## 2024-04-16 ENCOUNTER — APPOINTMENT (OUTPATIENT)
Dept: GENERAL RADIOLOGY | Age: 69
DRG: 165 | End: 2024-04-16
Attending: STUDENT IN AN ORGANIZED HEALTH CARE EDUCATION/TRAINING PROGRAM
Payer: MEDICARE

## 2024-04-16 LAB
ANION GAP SERPL CALCULATED.3IONS-SCNC: 11 MMOL/L (ref 3–16)
BUN SERPL-MCNC: 14 MG/DL (ref 7–20)
CALCIUM SERPL-MCNC: 8.4 MG/DL (ref 8.3–10.6)
CHLORIDE SERPL-SCNC: 101 MMOL/L (ref 99–110)
CO2 SERPL-SCNC: 26 MMOL/L (ref 21–32)
CREAT SERPL-MCNC: 0.7 MG/DL (ref 0.6–1.2)
DEPRECATED RDW RBC AUTO: 13.3 % (ref 12.4–15.4)
GFR SERPLBLD CREATININE-BSD FMLA CKD-EPI: >90 ML/MIN/{1.73_M2}
GLUCOSE SERPL-MCNC: 111 MG/DL (ref 70–99)
HCT VFR BLD AUTO: 32.2 % (ref 36–48)
HGB BLD-MCNC: 11.1 G/DL (ref 12–16)
MAGNESIUM SERPL-MCNC: 1.7 MG/DL (ref 1.8–2.4)
MCH RBC QN AUTO: 32.4 PG (ref 26–34)
MCHC RBC AUTO-ENTMCNC: 34.4 G/DL (ref 31–36)
MCV RBC AUTO: 94.2 FL (ref 80–100)
PHOSPHATE SERPL-MCNC: 3.5 MG/DL (ref 2.5–4.9)
PLATELET # BLD AUTO: 155 K/UL (ref 135–450)
PMV BLD AUTO: 9.3 FL (ref 5–10.5)
POTASSIUM SERPL-SCNC: 3.6 MMOL/L (ref 3.5–5.1)
RBC # BLD AUTO: 3.42 M/UL (ref 4–5.2)
SODIUM SERPL-SCNC: 138 MMOL/L (ref 136–145)
WBC # BLD AUTO: 10.8 K/UL (ref 4–11)

## 2024-04-16 PROCEDURE — 99024 POSTOP FOLLOW-UP VISIT: CPT

## 2024-04-16 PROCEDURE — 94640 AIRWAY INHALATION TREATMENT: CPT

## 2024-04-16 PROCEDURE — 2000000000 HC ICU R&B

## 2024-04-16 PROCEDURE — 94669 MECHANICAL CHEST WALL OSCILL: CPT

## 2024-04-16 PROCEDURE — P9045 ALBUMIN (HUMAN), 5%, 250 ML: HCPCS | Performed by: NURSE PRACTITIONER

## 2024-04-16 PROCEDURE — 85027 COMPLETE CBC AUTOMATED: CPT

## 2024-04-16 PROCEDURE — 6360000002 HC RX W HCPCS: Performed by: STUDENT IN AN ORGANIZED HEALTH CARE EDUCATION/TRAINING PROGRAM

## 2024-04-16 PROCEDURE — 51702 INSERT TEMP BLADDER CATH: CPT

## 2024-04-16 PROCEDURE — 6360000002 HC RX W HCPCS: Performed by: NURSE PRACTITIONER

## 2024-04-16 PROCEDURE — 83735 ASSAY OF MAGNESIUM: CPT

## 2024-04-16 PROCEDURE — 84100 ASSAY OF PHOSPHORUS: CPT

## 2024-04-16 PROCEDURE — 97162 PT EVAL MOD COMPLEX 30 MIN: CPT

## 2024-04-16 PROCEDURE — 97110 THERAPEUTIC EXERCISES: CPT

## 2024-04-16 PROCEDURE — 80048 BASIC METABOLIC PNL TOTAL CA: CPT

## 2024-04-16 PROCEDURE — 2580000003 HC RX 258: Performed by: STUDENT IN AN ORGANIZED HEALTH CARE EDUCATION/TRAINING PROGRAM

## 2024-04-16 PROCEDURE — 6370000000 HC RX 637 (ALT 250 FOR IP): Performed by: STUDENT IN AN ORGANIZED HEALTH CARE EDUCATION/TRAINING PROGRAM

## 2024-04-16 PROCEDURE — 51798 US URINE CAPACITY MEASURE: CPT

## 2024-04-16 PROCEDURE — 97530 THERAPEUTIC ACTIVITIES: CPT

## 2024-04-16 PROCEDURE — 97166 OT EVAL MOD COMPLEX 45 MIN: CPT

## 2024-04-16 PROCEDURE — 71045 X-RAY EXAM CHEST 1 VIEW: CPT

## 2024-04-16 PROCEDURE — 6370000000 HC RX 637 (ALT 250 FOR IP): Performed by: NURSE PRACTITIONER

## 2024-04-16 PROCEDURE — 97116 GAIT TRAINING THERAPY: CPT

## 2024-04-16 RX ORDER — ACETAMINOPHEN 325 MG/1
650 TABLET ORAL EVERY 4 HOURS PRN
Status: DISCONTINUED | OUTPATIENT
Start: 2024-04-16 | End: 2024-04-17 | Stop reason: HOSPADM

## 2024-04-16 RX ORDER — KETOROLAC TROMETHAMINE 30 MG/ML
15 INJECTION, SOLUTION INTRAMUSCULAR; INTRAVENOUS EVERY 6 HOURS
Status: COMPLETED | OUTPATIENT
Start: 2024-04-16 | End: 2024-04-17

## 2024-04-16 RX ORDER — MAGNESIUM SULFATE IN WATER 40 MG/ML
2000 INJECTION, SOLUTION INTRAVENOUS ONCE
Status: COMPLETED | OUTPATIENT
Start: 2024-04-16 | End: 2024-04-16

## 2024-04-16 RX ORDER — LEVALBUTEROL 1.25 MG/.5ML
1.25 SOLUTION, CONCENTRATE RESPIRATORY (INHALATION)
Status: DISCONTINUED | OUTPATIENT
Start: 2024-04-16 | End: 2024-04-17 | Stop reason: HOSPADM

## 2024-04-16 RX ORDER — ALBUMIN, HUMAN INJ 5% 5 %
12.5 SOLUTION INTRAVENOUS ONCE
Status: COMPLETED | OUTPATIENT
Start: 2024-04-16 | End: 2024-04-16

## 2024-04-16 RX ADMIN — LEVALBUTEROL 1.25 MG: 1.25 SOLUTION, CONCENTRATE RESPIRATORY (INHALATION) at 07:53

## 2024-04-16 RX ADMIN — AMLODIPINE BESYLATE 5 MG: 5 TABLET ORAL at 08:19

## 2024-04-16 RX ADMIN — ALBUMIN (HUMAN) 12.5 G: 12.5 INJECTION, SOLUTION INTRAVENOUS at 15:19

## 2024-04-16 RX ADMIN — ACETYLCYSTEINE 600 MG: 200 INHALANT RESPIRATORY (INHALATION) at 20:05

## 2024-04-16 RX ADMIN — KETOROLAC TROMETHAMINE 15 MG: 30 INJECTION, SOLUTION INTRAMUSCULAR at 20:30

## 2024-04-16 RX ADMIN — ACETAMINOPHEN 650 MG: 325 TABLET ORAL at 14:07

## 2024-04-16 RX ADMIN — BACITRACIN: 500 OINTMENT TOPICAL at 08:20

## 2024-04-16 RX ADMIN — LEVALBUTEROL 1.25 MG: 1.25 SOLUTION, CONCENTRATE RESPIRATORY (INHALATION) at 03:31

## 2024-04-16 RX ADMIN — LEVALBUTEROL 1.25 MG: 1.25 SOLUTION, CONCENTRATE RESPIRATORY (INHALATION) at 20:05

## 2024-04-16 RX ADMIN — MORPHINE SULFATE 2 MG: 2 INJECTION, SOLUTION INTRAMUSCULAR; INTRAVENOUS at 01:08

## 2024-04-16 RX ADMIN — Medication 2 PUFF: at 20:10

## 2024-04-16 RX ADMIN — ACETAMINOPHEN 650 MG: 325 TABLET ORAL at 22:05

## 2024-04-16 RX ADMIN — POLYETHYLENE GLYCOL 3350 17 G: 17 POWDER, FOR SOLUTION ORAL at 09:16

## 2024-04-16 RX ADMIN — FAMOTIDINE 20 MG: 20 TABLET, FILM COATED ORAL at 20:30

## 2024-04-16 RX ADMIN — MORPHINE SULFATE 2 MG: 2 INJECTION, SOLUTION INTRAMUSCULAR; INTRAVENOUS at 06:37

## 2024-04-16 RX ADMIN — Medication 2 PUFF: at 07:53

## 2024-04-16 RX ADMIN — Medication 10 ML: at 08:20

## 2024-04-16 RX ADMIN — ATORVASTATIN CALCIUM 10 MG: 10 TABLET, FILM COATED ORAL at 20:30

## 2024-04-16 RX ADMIN — KETOROLAC TROMETHAMINE 15 MG: 30 INJECTION, SOLUTION INTRAMUSCULAR at 08:19

## 2024-04-16 RX ADMIN — TIOTROPIUM BROMIDE INHALATION SPRAY 2 PUFF: 3.12 SPRAY, METERED RESPIRATORY (INHALATION) at 07:53

## 2024-04-16 RX ADMIN — HYDROCHLOROTHIAZIDE 12.5 MG: 25 TABLET ORAL at 08:18

## 2024-04-16 RX ADMIN — ENOXAPARIN SODIUM 40 MG: 100 INJECTION SUBCUTANEOUS at 08:19

## 2024-04-16 RX ADMIN — ACETYLCYSTEINE 600 MG: 200 INHALANT RESPIRATORY (INHALATION) at 07:53

## 2024-04-16 RX ADMIN — GABAPENTIN 300 MG: 300 CAPSULE ORAL at 20:30

## 2024-04-16 RX ADMIN — PANTOPRAZOLE SODIUM 40 MG: 40 TABLET, DELAYED RELEASE ORAL at 06:37

## 2024-04-16 RX ADMIN — LEVALBUTEROL 1.25 MG: 1.25 SOLUTION, CONCENTRATE RESPIRATORY (INHALATION) at 11:50

## 2024-04-16 RX ADMIN — FAMOTIDINE 20 MG: 20 TABLET, FILM COATED ORAL at 08:18

## 2024-04-16 RX ADMIN — GABAPENTIN 300 MG: 300 CAPSULE ORAL at 14:08

## 2024-04-16 RX ADMIN — KETOROLAC TROMETHAMINE 15 MG: 30 INJECTION, SOLUTION INTRAMUSCULAR at 14:08

## 2024-04-16 RX ADMIN — ACETYLCYSTEINE 600 MG: 200 INHALANT RESPIRATORY (INHALATION) at 11:50

## 2024-04-16 RX ADMIN — MORPHINE SULFATE 2 MG: 2 INJECTION, SOLUTION INTRAMUSCULAR; INTRAVENOUS at 15:03

## 2024-04-16 RX ADMIN — MAGNESIUM SULFATE HEPTAHYDRATE 2000 MG: 40 INJECTION, SOLUTION INTRAVENOUS at 08:30

## 2024-04-16 RX ADMIN — GABAPENTIN 300 MG: 300 CAPSULE ORAL at 08:19

## 2024-04-16 ASSESSMENT — PAIN DESCRIPTION - LOCATION
LOCATION: HEAD
LOCATION: CHEST;RIB CAGE
LOCATION: CHEST;RIB CAGE
LOCATION: HEAD;OTHER (COMMENT)
LOCATION: RIB CAGE
LOCATION: HEAD;OTHER (COMMENT)
LOCATION: HEAD
LOCATION: HEAD

## 2024-04-16 ASSESSMENT — PAIN SCALES - GENERAL
PAINLEVEL_OUTOF10: 3
PAINLEVEL_OUTOF10: 0
PAINLEVEL_OUTOF10: 5
PAINLEVEL_OUTOF10: 6
PAINLEVEL_OUTOF10: 8
PAINLEVEL_OUTOF10: 1
PAINLEVEL_OUTOF10: 9
PAINLEVEL_OUTOF10: 2
PAINLEVEL_OUTOF10: 5
PAINLEVEL_OUTOF10: 3

## 2024-04-16 ASSESSMENT — PAIN - FUNCTIONAL ASSESSMENT
PAIN_FUNCTIONAL_ASSESSMENT: PREVENTS OR INTERFERES SOME ACTIVE ACTIVITIES AND ADLS
PAIN_FUNCTIONAL_ASSESSMENT: ACTIVITIES ARE NOT PREVENTED
PAIN_FUNCTIONAL_ASSESSMENT: ACTIVITIES ARE NOT PREVENTED
PAIN_FUNCTIONAL_ASSESSMENT: PREVENTS OR INTERFERES SOME ACTIVE ACTIVITIES AND ADLS
PAIN_FUNCTIONAL_ASSESSMENT: ACTIVITIES ARE NOT PREVENTED

## 2024-04-16 ASSESSMENT — PAIN DESCRIPTION - DESCRIPTORS
DESCRIPTORS: ACHING
DESCRIPTORS: SHARP;DULL
DESCRIPTORS: ACHING
DESCRIPTORS: ACHING
DESCRIPTORS: DISCOMFORT
DESCRIPTORS: DISCOMFORT

## 2024-04-16 ASSESSMENT — PAIN DESCRIPTION - ORIENTATION
ORIENTATION: RIGHT

## 2024-04-16 ASSESSMENT — PAIN SCALES - WONG BAKER: WONGBAKER_NUMERICALRESPONSE: NO HURT

## 2024-04-16 NOTE — PROGRESS NOTES
Shift: [id number]    Admitting diagnosis: Pulmonary Nodule    Presentation to hospital: VATS    Surgery: yes - 4/15     Nursing assessment at handoff  stable    Emergency Contact/POA:Manohar Thrasher  Family updated: yes - visited    Most recent vitals: BP (!) 120/58   Pulse 82   Temp 98.3 °F (36.8 °C) (Oral)   Resp 12   Ht 1.626 m (5' 4\")   Wt 63.8 kg (140 lb 10.5 oz)   SpO2 100%   BMI 24.13 kg/m²      Rhythm: Normal Sinus Rhythm 80s     NC/HFNC- n/a  Respiratory support: - No ventilator support    Vent days: Day n/a    Increased O2 requirements: no    Admission weight Weight - Scale: 66.7 kg (147 lb)  Today's weight   Wt Readings from Last 1 Encounters:   04/16/24 63.8 kg (140 lb 10.5 oz)         UOP >30ml/hr: yes    Crowell need assessed each shift: no    Restraints: no  Order current and documentation up to date?    Lines/Drains  LDA Insertion Date Discontinued Date Dressing Changes   PIV   X2 4/15     TLC       Arterial   4/15  4/16    Crowell   4/16 4/16    Vas Cath      ETT       Surgical drains        Night Shift Hospitalist Interventions    Problem(Brief) Date Time Intervention Physician contacted                                               Drip rates at handoff:    sodium chloride         Hospital Course Daily Updates:  Admit Day# 0 04/15/24 Days  -VATS R middle lobectomy & lymph node dissection  -Admitted to ICU    Admit Day# 0 04/15/24 Nights  -noted urinary retension: crowell inserted  -CT output 156 ml    Admit Day #1 04/16/24 Days   -A-line removed  -Crowell removed  -worked with PT/OT, up in chair most of shift     Lab Data:   CBC:   Recent Labs     04/15/24  1152 04/16/24  0550   WBC 12.5* 10.8   HGB 12.5 11.1*   HCT 37.1 32.2*   MCV 95.9 94.2    155       BMP:    Recent Labs     04/16/24  0550      K 3.6   CO2 26   BUN 14   CREATININE 0.7     LIVR: No results for input(s): \"AST\", \"ALT\" in the last 72 hours.  PT/INR: No results for input(s): \"PROT\", \"INR\" in the last 72 hours.  APTT: No  results for input(s): \"APTT\" in the last 72 hours.  ABG: No results for input(s): \"PHART\", \"EDK0KUA\", \"PO2ART\" in the last 72 hours.  Consults (if GI or Nephrology- which group?)-  none

## 2024-04-16 NOTE — PROGRESS NOTES
Shift: [id number]    Admitting diagnosis: Pulmonary Nodule    Presentation to hospital: VATS    Surgery: yes - 4/15     Nursing assessment at handoff  stable    Emergency Contact/POA:Manohar Thrasher  Family updated: yes - visited    Most recent vitals: /60   Pulse 85   Temp 98.4 °F (36.9 °C) (Oral)   Resp 25   Ht 1.626 m (5' 4\")   Wt 66.7 kg (147 lb)   SpO2 97%   BMI 25.23 kg/m²      Rhythm: Normal Sinus Rhythm 80s     NC/HFNC- 1 lpm  Respiratory support: - No ventilator support    Vent days: Day n/a    Increased O2 requirements: no    Admission weight Weight - Scale: 66.7 kg (147 lb)  Today's weight   Wt Readings from Last 1 Encounters:   04/16/24 63.8 kg (140 lb 10.5 oz)         UOP >30ml/hr: yes    Crowell need assessed each shift: no    Restraints: no  Order current and documentation up to date?    Lines/Drains  LDA Insertion Date Discontinued Date Dressing Changes   PIV   X2 4/15     TLC       Arterial   4/15     Crowell   4/16     Vas Cath      ETT       Surgical drains        Night Shift Hospitalist Interventions    Problem(Brief) Date Time Intervention Physician contacted                                               Drip rates at handoff:    sodium chloride         Hospital Course Daily Updates:  Admit Day# 0 04/15/24  -VATS R middle lobectomy & lymph node dissection  -Admitted to ICU    Admit Day# 0 04/15/24 Nights  -noted urinary retension: crowell inserted  -CT output 156 ml    Lab Data:   CBC:   Recent Labs     04/15/24  1152   WBC 12.5*   HGB 12.5   HCT 37.1   MCV 95.9          BMP:  No results for input(s): \"NA\", \"K\", \"CO2\", \"BUN\", \"CREATININE\", \"CA\" in the last 72 hours.    Invalid input(s): \"MAGNESIUM\"  LIVR: No results for input(s): \"AST\", \"ALT\" in the last 72 hours.  PT/INR: No results for input(s): \"PROT\", \"INR\" in the last 72 hours.  APTT: No results for input(s): \"APTT\" in the last 72 hours.  ABG: No results for input(s): \"PHART\", \"[id number]\", \"PO2ART\" in the last 72 hours.  Consults

## 2024-04-16 NOTE — PROGRESS NOTES
CVTS Thoracic Progress Note:          CC:  Post op follow up s/p RML lobectomy w/ LND 4/15    Subj: Doing well. No acute event overnight    Obj:    Blood pressure 131/62, pulse 91, temperature 99.1 °F (37.3 °C), resp. rate 19, height 1.626 m (5' 4\"), weight 63.8 kg (140 lb 10.5 oz), SpO2 97 %.    Alert, oriented , OOB to chair, NAD  S1 S2 normal. SR on monitor  Lungs diminished   Abdomen rounded, soft. Non tender bowel sounds   R chest incision w/ dressing CDI   UOP 1195 ml/24 hr; Cr 0.7   Chest tube with -171=639 ml of serosanguineous drainage in last 24 hours/no airleak    Diagnostics:   CBC with Differential:    Lab Results   Component Value Date/Time    WBC 10.8 04/16/2024 05:50 AM    RBC 3.42 04/16/2024 05:50 AM    HGB 11.1 04/16/2024 05:50 AM    HCT 32.2 04/16/2024 05:50 AM     04/16/2024 05:50 AM    MCV 94.2 04/16/2024 05:50 AM    MCH 32.4 04/16/2024 05:50 AM    MCHC 34.4 04/16/2024 05:50 AM    RDW 13.3 04/16/2024 05:50 AM    LYMPHOPCT 22.5 04/12/2024 12:27 PM    MONOPCT 7.8 04/12/2024 12:27 PM    BASOPCT 1.2 04/12/2024 12:27 PM    MONOSABS 0.5 04/12/2024 12:27 PM    LYMPHSABS 1.5 04/12/2024 12:27 PM    EOSABS 0.2 04/12/2024 12:27 PM    BASOSABS 0.1 04/12/2024 12:27 PM     CMP:    Lab Results   Component Value Date/Time     04/16/2024 05:50 AM    K 3.6 04/16/2024 05:50 AM     04/16/2024 05:50 AM    CO2 26 04/16/2024 05:50 AM    BUN 14 04/16/2024 05:50 AM    CREATININE 0.7 04/16/2024 05:50 AM    LABGLOM >90 04/16/2024 05:50 AM    GLUCOSE 111 04/16/2024 05:50 AM    CALCIUM 8.4 04/16/2024 05:50 AM     Recent Labs     04/15/24  1152 04/16/24  0550   WBC 12.5* 10.8   HGB 12.5 11.1*   HCT 37.1 32.2*    155                                                                  Recent Labs     04/16/24  0550      K 3.6      CO2 26   BUN 14   CREATININE 0.7   GLUCOSE 111*   PHOS 3.5            Recent Labs     04/16/24  0550   MG 1.70*      No results for input(s): \"TROPONINI\" in

## 2024-04-16 NOTE — PLAN OF CARE
Problem: Discharge Planning  Goal: Discharge to home or other facility with appropriate resources  4/15/2024 2215 by Janae Zaragoza, RN  Outcome: Progressing  Flowsheets (Taken 4/15/2024 2000)  Discharge to home or other facility with appropriate resources:   Identify barriers to discharge with patient and caregiver   Arrange for needed discharge resources and transportation as appropriate   Identify discharge learning needs (meds, wound care, etc)   Refer to discharge planning if patient needs post-hospital services based on physician order or complex needs related to functional status, cognitive ability or social support system     Problem: ABCDS Injury Assessment  Goal: Absence of physical injury  4/15/2024 2215 by Janae Zaragoza, LEE  Outcome: Progressing    Problem: Safety - Adult  Goal: Free from fall injury  4/15/2024 2215 by Janae Zaragoza, LEE  Outcome: Progressing     Problem: Skin/Tissue Integrity  Goal: Absence of new skin breakdown  Description: 1.  Monitor for areas of redness and/or skin breakdown  2.  Assess vascular access sites hourly  3.  Every 4-6 hours minimum:  Change oxygen saturation probe site  4.  Every 4-6 hours:  If on nasal continuous positive airway pressure, respiratory therapy assess nares and determine need for appliance change or resting period.  4/15/2024 2215 by Janae Zaragoza, LEE  Outcome: Progressing     Problem: Skin/Tissue Integrity - Adult  Goal: Incisions, wounds, or drain sites healing without S/S of infection  Outcome: Progressing  Flowsheets (Taken 4/15/2024 2000)  Incisions, Wounds, or Drain Sites Healing Without Sign and Symptoms of Infection:   ADMISSION and DAILY: Assess and document risk factors for pressure ulcer development   TWICE DAILY: Assess and document skin integrity   TWICE DAILY: Assess and document dressing/incision, wound bed, drain sites and surrounding tissue   Implement wound care per orders   Initiate

## 2024-04-16 NOTE — PROGRESS NOTES
Minimum assistance;Additional time;Adaptive equipment (rollator)  Stand to Sit: Minimum assistance;Additional time;Adaptive equipment  Gait Training  Right Side Weight Bearing: Full  Left Side Weight Bearing: Full  Gait  Gait Training: Yes  Left Side Weight Bearing: Full  Right Side Weight Bearing: Full  Overall Level of Assistance: Contact-guard assistance  Distance (ft): 35 Feet (in unit to door and back to chair)  Assistive Device: Walker, rollator;Gait belt  Interventions: Verbal cues  Base of Support: Narrowed  Speed/Soo: Slow  Step Length: Left shortened;Right shortened  Swing Pattern: Right symmetrical;Left symmetrical  Gait Abnormalities: Trunk sway increased;Decreased step clearance  Wheelchair Management  Wheelchair Management: No              Balance  Posture: Good  Sitting - Static: Good  Sitting - Dynamic: Good  Standing - Static: Fair  Standing - Dynamic: Fair  Exercise Treatment: Seated BLE: CHARLIE DEAL marches x 10          AM-PAC - Mobility    AM-PAC Basic Mobility - Inpatient   How much help is needed turning from your back to your side while in a flat bed without using bedrails?: A Little  How much help is needed moving from lying on your back to sitting on the side of a flat bed without using bedrails?: A Little  How much help is needed moving to and from a bed to a chair?: A Little  How much help is needed standing up from a chair using your arms?: A Little  How much help is needed walking in hospital room?: A Little  How much help is needed climbing 3-5 steps with a railing?: A Lot  AM-PAC Inpatient Mobility Raw Score : 17  AM-PAC Inpatient T-Scale Score : 42.13  Mobility Inpatient CMS 0-100% Score: 50.57  Mobility Inpatient CMS G-Code Modifier : CK         Goals  Short Term Goals  Time Frame for Short Term Goals: 4/23/24  Short Term Goal 1: Pt will ambulate 100ft with rollator and SBA  Short Term Goal 2: Pt will navigate 3 stairs withiout HR and CGA  Short Term Goal 3: Pt will complete 12-15

## 2024-04-16 NOTE — PROGRESS NOTES
Occupational Therapy  Facility/Department: [facility] C2 CARD TELEMETRY  Occupational Therapy Initial Assessment and Treatment    Name: Cristy Thrasher  : 1955  MRN: [id number]  Date of Service: 2024    Discharge Recommendations:  24 hour supervision or assist, Home with Home health OT, S Level 1  OT Equipment Recommendations  Other: CTA     If pt is unable to be seen after this session, please let this note serve as discharge summary.  Please see case management note for discharge disposition.  Thank you.    Patient Diagnosis(es): The encounter diagnosis was Pulmonary nodule.  Past Medical History:  has a past medical history of Arthritis, Cataract, Coronary artery disease involving native coronary artery of native heart without angina pectoris, Hiatal hernia, Hyperlipidemia, and Hypertension.  Past Surgical History:  has a past surgical history that includes Colonoscopy (); Colonoscopy (); Gallbladder surgery (); Breast lumpectomy (); Breast lumpectomy (); Hysterectomy (); Uterus surgery (); Small intestine surgery (); Cataract extraction; and Thoracoscopy (Right, 4/15/2024).           Assessment   Performance deficits / Impairments: Decreased functional mobility ;Decreased endurance;Decreased ADL status;Decreased balance;Decreased strength  Assessment: Pt is 69 yo female presenting from home w/ spouse, s/p VATS- RML lobectomy w/ LND 4/15. PLOF IND with all ADLs and mobility w/o AD. Pt is functioning below baseline this date, requiring min A for sit<>stand transfers and CGA for mobility w/ rollator. She was able to perform LB dressing w/ CGA and increased time. Discussed BUE AROM ex, did not perform 2/2 pain around surgical site in chest, however did tolerate x10 BUE scapular elevation. Pt's states her  and daughter (who lives close by) work full-time but can take off work to provide assistance if needed. Pt is limited by weakness, fatigue, balance, pain, endurance, and  Primary  Shopping Responsibility: Primary  Health Care Management: Primary  Ambulation Assistance: Independent  Transfer Assistance: Independent  Active : Yes ( and family can assist w/ transport)  Mode of Transportation: Car  Occupation: Retired  Leisure & Hobbies: watch TV, go outside, take the dog out, go to [employer]  Additional Comments:  works full time but could take time off if needed; works 5-7 mins from home; daughter can also assist PRN       Objective   Temp: 98.3 °F (36.8 °C)  Pulse: 82  Heart Rate Source: Monitor  Respirations: 12  SpO2: 100 %  O2 Device: None (Room air)  BP: (!) 120/58  MAP (Calculated): 79  BP Location: Left upper arm  BP Method: Automatic             Safety Devices  Type of Devices: Call light within reach;All fall risk precautions in place;Gait belt;Heels elevated for pressure relief;Patient at risk for falls;Left in chair;Nurse notified  Restraints  Restraints Initially in Place: No  Bed Mobility Training  Bed Mobility Training: No (pt in chair at start and end of session)  Balance  Sitting: Intact  Standing: Impaired  Standing - Static: Good;Occasional  Standing - Dynamic: Fair;Constant support  Transfer Training  Transfer Training: Yes  Overall Level of Assistance: Minimum assistance  Sit to Stand: Minimum assistance  Stand to Sit: Minimum assistance  Gait  Gait Training: Yes  Overall Level of Assistance: Contact-guard assistance  Assistive Device: Walker, rollator;Gait belt  Interventions: Verbal cues     AROM: Generally decreased, functional  Strength: Generally decreased, functional  Coordination: Generally decreased, functional  Tone: Normal  Sensation: Intact  ADL  Feeding: Independent  LE Dressing: Contact guard assistance  LE Dressing Skilled Clinical Factors: threaded BLE into brief while seated in chair, pull up over hips standing at rollator w/ CGA  Toileting Skilled Clinical Factors: not assessed  Functional Mobility: Contact guard

## 2024-04-17 ENCOUNTER — APPOINTMENT (OUTPATIENT)
Dept: GENERAL RADIOLOGY | Age: 69
DRG: 165 | End: 2024-04-17
Attending: STUDENT IN AN ORGANIZED HEALTH CARE EDUCATION/TRAINING PROGRAM
Payer: MEDICARE

## 2024-04-17 VITALS
BODY MASS INDEX: 25.66 KG/M2 | DIASTOLIC BLOOD PRESSURE: 56 MMHG | HEIGHT: 64 IN | SYSTOLIC BLOOD PRESSURE: 128 MMHG | OXYGEN SATURATION: 95 % | TEMPERATURE: 98.8 F | RESPIRATION RATE: 18 BRPM | WEIGHT: 150.3 LBS | HEART RATE: 92 BPM

## 2024-04-17 PROBLEM — R91.1 LUNG NODULE: Status: RESOLVED | Noted: 2024-04-15 | Resolved: 2024-04-17

## 2024-04-17 PROBLEM — R91.1 PULMONARY NODULE: Status: RESOLVED | Noted: 2024-02-21 | Resolved: 2024-04-17

## 2024-04-17 LAB
ANION GAP SERPL CALCULATED.3IONS-SCNC: 10 MMOL/L (ref 3–16)
BUN SERPL-MCNC: 15 MG/DL (ref 7–20)
CALCIUM SERPL-MCNC: 8.5 MG/DL (ref 8.3–10.6)
CHLORIDE SERPL-SCNC: 103 MMOL/L (ref 99–110)
CO2 SERPL-SCNC: 27 MMOL/L (ref 21–32)
CREAT SERPL-MCNC: 0.6 MG/DL (ref 0.6–1.2)
DEPRECATED RDW RBC AUTO: 13.4 % (ref 12.4–15.4)
GFR SERPLBLD CREATININE-BSD FMLA CKD-EPI: >90 ML/MIN/{1.73_M2}
GLUCOSE SERPL-MCNC: 105 MG/DL (ref 70–99)
HCT VFR BLD AUTO: 30.5 % (ref 36–48)
HGB BLD-MCNC: 10.5 G/DL (ref 12–16)
MAGNESIUM SERPL-MCNC: 2.1 MG/DL (ref 1.8–2.4)
MCH RBC QN AUTO: 32.7 PG (ref 26–34)
MCHC RBC AUTO-ENTMCNC: 34.5 G/DL (ref 31–36)
MCV RBC AUTO: 94.7 FL (ref 80–100)
PHOSPHATE SERPL-MCNC: 2.8 MG/DL (ref 2.5–4.9)
PLATELET # BLD AUTO: 139 K/UL (ref 135–450)
PMV BLD AUTO: 9.8 FL (ref 5–10.5)
POTASSIUM SERPL-SCNC: 3.5 MMOL/L (ref 3.5–5.1)
RBC # BLD AUTO: 3.22 M/UL (ref 4–5.2)
SODIUM SERPL-SCNC: 140 MMOL/L (ref 136–145)
WBC # BLD AUTO: 7.6 K/UL (ref 4–11)

## 2024-04-17 PROCEDURE — 97116 GAIT TRAINING THERAPY: CPT

## 2024-04-17 PROCEDURE — 71045 X-RAY EXAM CHEST 1 VIEW: CPT

## 2024-04-17 PROCEDURE — 80048 BASIC METABOLIC PNL TOTAL CA: CPT

## 2024-04-17 PROCEDURE — 6370000000 HC RX 637 (ALT 250 FOR IP): Performed by: STUDENT IN AN ORGANIZED HEALTH CARE EDUCATION/TRAINING PROGRAM

## 2024-04-17 PROCEDURE — 94669 MECHANICAL CHEST WALL OSCILL: CPT

## 2024-04-17 PROCEDURE — 6360000002 HC RX W HCPCS: Performed by: STUDENT IN AN ORGANIZED HEALTH CARE EDUCATION/TRAINING PROGRAM

## 2024-04-17 PROCEDURE — 85027 COMPLETE CBC AUTOMATED: CPT

## 2024-04-17 PROCEDURE — 94640 AIRWAY INHALATION TREATMENT: CPT

## 2024-04-17 PROCEDURE — 99024 POSTOP FOLLOW-UP VISIT: CPT

## 2024-04-17 PROCEDURE — 36415 COLL VENOUS BLD VENIPUNCTURE: CPT

## 2024-04-17 PROCEDURE — 6360000002 HC RX W HCPCS: Performed by: NURSE PRACTITIONER

## 2024-04-17 PROCEDURE — 6370000000 HC RX 637 (ALT 250 FOR IP): Performed by: NURSE PRACTITIONER

## 2024-04-17 PROCEDURE — 83735 ASSAY OF MAGNESIUM: CPT

## 2024-04-17 PROCEDURE — 84100 ASSAY OF PHOSPHORUS: CPT

## 2024-04-17 PROCEDURE — 97535 SELF CARE MNGMENT TRAINING: CPT

## 2024-04-17 PROCEDURE — 97530 THERAPEUTIC ACTIVITIES: CPT

## 2024-04-17 PROCEDURE — 2580000003 HC RX 258: Performed by: STUDENT IN AN ORGANIZED HEALTH CARE EDUCATION/TRAINING PROGRAM

## 2024-04-17 RX ORDER — METHOCARBAMOL 500 MG/1
500 TABLET, FILM COATED ORAL 3 TIMES DAILY
Qty: 30 TABLET | Refills: 0 | Status: SHIPPED | OUTPATIENT
Start: 2024-04-17 | End: 2024-04-27

## 2024-04-17 RX ORDER — OXYCODONE HYDROCHLORIDE 5 MG/1
5 TABLET ORAL EVERY 6 HOURS PRN
Status: DISCONTINUED | OUTPATIENT
Start: 2024-04-17 | End: 2024-04-17 | Stop reason: HOSPADM

## 2024-04-17 RX ORDER — BISACODYL 5 MG/1
5 TABLET, DELAYED RELEASE ORAL DAILY PRN
Qty: 14 TABLET | Refills: 0 | Status: SHIPPED | OUTPATIENT
Start: 2024-04-17 | End: 2024-05-01

## 2024-04-17 RX ORDER — OXYCODONE HYDROCHLORIDE 5 MG/1
5 TABLET ORAL EVERY 6 HOURS PRN
Qty: 28 TABLET | Refills: 0 | Status: SHIPPED | OUTPATIENT
Start: 2024-04-17 | End: 2024-04-24

## 2024-04-17 RX ADMIN — ENOXAPARIN SODIUM 40 MG: 100 INJECTION SUBCUTANEOUS at 08:56

## 2024-04-17 RX ADMIN — FAMOTIDINE 20 MG: 20 TABLET, FILM COATED ORAL at 08:53

## 2024-04-17 RX ADMIN — Medication 2 PUFF: at 07:52

## 2024-04-17 RX ADMIN — LEVALBUTEROL 1.25 MG: 1.25 SOLUTION, CONCENTRATE RESPIRATORY (INHALATION) at 12:01

## 2024-04-17 RX ADMIN — AMLODIPINE BESYLATE 5 MG: 5 TABLET ORAL at 08:53

## 2024-04-17 RX ADMIN — POLYETHYLENE GLYCOL 3350 17 G: 17 POWDER, FOR SOLUTION ORAL at 08:56

## 2024-04-17 RX ADMIN — Medication 10 ML: at 08:57

## 2024-04-17 RX ADMIN — ACETYLCYSTEINE 600 MG: 200 INHALANT RESPIRATORY (INHALATION) at 07:50

## 2024-04-17 RX ADMIN — LEVALBUTEROL 1.25 MG: 1.25 SOLUTION, CONCENTRATE RESPIRATORY (INHALATION) at 07:50

## 2024-04-17 RX ADMIN — GABAPENTIN 300 MG: 300 CAPSULE ORAL at 08:53

## 2024-04-17 RX ADMIN — HYDROCHLOROTHIAZIDE 12.5 MG: 25 TABLET ORAL at 08:54

## 2024-04-17 RX ADMIN — PANTOPRAZOLE SODIUM 40 MG: 40 TABLET, DELAYED RELEASE ORAL at 06:41

## 2024-04-17 RX ADMIN — KETOROLAC TROMETHAMINE 15 MG: 30 INJECTION, SOLUTION INTRAMUSCULAR at 02:04

## 2024-04-17 RX ADMIN — ACETAMINOPHEN 650 MG: 325 TABLET ORAL at 02:16

## 2024-04-17 RX ADMIN — GABAPENTIN 300 MG: 300 CAPSULE ORAL at 13:54

## 2024-04-17 RX ADMIN — MORPHINE SULFATE 2 MG: 2 INJECTION, SOLUTION INTRAMUSCULAR; INTRAVENOUS at 02:15

## 2024-04-17 RX ADMIN — ACETYLCYSTEINE 600 MG: 200 INHALANT RESPIRATORY (INHALATION) at 12:01

## 2024-04-17 RX ADMIN — TIOTROPIUM BROMIDE INHALATION SPRAY 2 PUFF: 3.12 SPRAY, METERED RESPIRATORY (INHALATION) at 07:52

## 2024-04-17 RX ADMIN — MORPHINE SULFATE 2 MG: 2 INJECTION, SOLUTION INTRAMUSCULAR; INTRAVENOUS at 06:47

## 2024-04-17 RX ADMIN — BACITRACIN: 500 OINTMENT TOPICAL at 07:38

## 2024-04-17 ASSESSMENT — PAIN SCALES - WONG BAKER
WONGBAKER_NUMERICALRESPONSE: NO HURT

## 2024-04-17 ASSESSMENT — PAIN DESCRIPTION - LOCATION
LOCATION: RIB CAGE
LOCATION: HEAD

## 2024-04-17 ASSESSMENT — PAIN DESCRIPTION - ONSET
ONSET: ON-GOING
ONSET: GRADUAL

## 2024-04-17 ASSESSMENT — PAIN DESCRIPTION - ORIENTATION
ORIENTATION: LEFT;RIGHT
ORIENTATION: RIGHT

## 2024-04-17 ASSESSMENT — PAIN SCALES - GENERAL
PAINLEVEL_OUTOF10: 6
PAINLEVEL_OUTOF10: 6
PAINLEVEL_OUTOF10: 3
PAINLEVEL_OUTOF10: 5
PAINLEVEL_OUTOF10: 0
PAINLEVEL_OUTOF10: 0

## 2024-04-17 ASSESSMENT — PAIN DESCRIPTION - PAIN TYPE
TYPE: SURGICAL PAIN
TYPE: ACUTE PAIN;CHRONIC PAIN

## 2024-04-17 ASSESSMENT — PAIN DESCRIPTION - DESCRIPTORS
DESCRIPTORS: SORE
DESCRIPTORS: ACHING

## 2024-04-17 ASSESSMENT — PAIN - FUNCTIONAL ASSESSMENT
PAIN_FUNCTIONAL_ASSESSMENT: ACTIVITIES ARE NOT PREVENTED
PAIN_FUNCTIONAL_ASSESSMENT: ACTIVITIES ARE NOT PREVENTED

## 2024-04-17 NOTE — PROGRESS NOTES
CVTS Thoracic Progress Note:          CC:  Post op follow up s/p RML lobectomy w/ LND 4/15    Subj: Doing well. Glad she did surgery. No acute event overnight    Obj:    Blood pressure 119/65, pulse 90, temperature 98.3 °F (36.8 °C), temperature source Oral, resp. rate 21, height 1.626 m (5' 4\"), weight 68.2 kg (150 lb 4.8 oz), SpO2 93 %.    Alert, oriented , OOB to chair, NAD, family at bedside  S1 S2 normal. SR on monitor  Lungs diminished   Abdomen rounded, soft. Non tender bowel sounds   R chest incision w/ dressing CDI    ml/24 hr, x 3 unmeasured.  Cr 0.6   Chest tube with 20-60-00=230 ml of serosanguineous drainage in last 24 hours/no airleak    Diagnostics:   CBC with Differential:    Lab Results   Component Value Date/Time    WBC 7.6 04/17/2024 05:19 AM    RBC 3.22 04/17/2024 05:19 AM    HGB 10.5 04/17/2024 05:19 AM    HCT 30.5 04/17/2024 05:19 AM     04/17/2024 05:19 AM    MCV 94.7 04/17/2024 05:19 AM    MCH 32.7 04/17/2024 05:19 AM    MCHC 34.5 04/17/2024 05:19 AM    RDW 13.4 04/17/2024 05:19 AM    LYMPHOPCT 22.5 04/12/2024 12:27 PM    MONOPCT 7.8 04/12/2024 12:27 PM    BASOPCT 1.2 04/12/2024 12:27 PM    MONOSABS 0.5 04/12/2024 12:27 PM    LYMPHSABS 1.5 04/12/2024 12:27 PM    EOSABS 0.2 04/12/2024 12:27 PM    BASOSABS 0.1 04/12/2024 12:27 PM     CMP:    Lab Results   Component Value Date/Time     04/17/2024 05:19 AM    K 3.5 04/17/2024 05:19 AM     04/17/2024 05:19 AM    CO2 27 04/17/2024 05:19 AM    BUN 15 04/17/2024 05:19 AM    CREATININE 0.6 04/17/2024 05:19 AM    LABGLOM >90 04/17/2024 05:19 AM    GLUCOSE 105 04/17/2024 05:19 AM    CALCIUM 8.5 04/17/2024 05:19 AM     Recent Labs     04/15/24  1152 04/16/24  0550 04/17/24  0519   WBC 12.5* 10.8 7.6   HGB 12.5 11.1* 10.5*   HCT 37.1 32.2* 30.5*    155 139                                                                    Recent Labs     04/16/24  0550 04/17/24  0519    140   K 3.6 3.5    103   CO2 26 27   BUN

## 2024-04-17 NOTE — DISCHARGE INSTRUCTIONS
Thoracic Procedure Discharge Instructions     Chest Tube Insertion Site:   Leave dressing on until 4/18 . You may remove it at that time and clean the site daily with antibacterial soap. You may take a shower after the dressing is removed.     Surgical Procedure Site:   You may leave your old incision site open to air after discharge home. Clean the site daily with antibacterial soap.    All Surgical/Chest Tube Sites:   -Once you remove your old chest tube dressing, you may apply a band-aid as needed for any spotting and/or drainage. A dressing is not needed and please do not use creams, lotions, or Neosporin (antibiotic cream) on or around surgical and old chest tube sites.    -Do not soak in a bath tub and do not let water beat on your incisions during a shower.    -Do not lift anything greater than 10 pounds for 2 weeks while incision heals.     Special Instructions:   -Continue to use your incentive spirometer every 1-2 hours while you are awake at home to keep your lungs inflated and to prevent the development of pneumonia. Continue to use your incentive spirometer until your next appointment with Dr. Amezcua and you are instructed to do otherwise by your surgeon.   -Call Dr. Amezcua's office if any of your incisions appear red, if they are draining any fluid that appears like pus, if you are running a high grade fever (greater than 101.5 degrees), or if you have any questions about your surgery or incisions at any time.      Follow up with Dr. Amezcua on 4/30 @ 10:30 am so that you can be evaluated after your surgical procedure. Please call the Cardiac, [facility] Thor[facility] Surgeons' Office at [phone] to make that appointment after you are discharged.

## 2024-04-17 NOTE — PROGRESS NOTES
AVS and discharge orders reviewed w/ pt and family, all questions answered and pt/ pt family verbalized understanding.

## 2024-04-17 NOTE — PROGRESS NOTES
Occupational Therapy  Facility/Department: [facility] C2 CARD TELEMETRY  Daily Treatment Note and Discharge Summary  NAME: Cristy Thrasher  : 1955  MRN: [id number]    Date of Service: 2024    Discharge Recommendations:  24 hour supervision or assist     Patient Diagnosis(es): The primary encounter diagnosis was Right middle lobe pulmonary nodule. A diagnosis of Pulmonary nodule was also pertinent to this visit.     Assessment    Assessment: Pt has met OT goals. She is SBA for bathroom mobility and toilet/bed transfers with 4WW. Pt able to complete self-care tasks with SBA to independent level, using 4WW in process.  She has family support at d/c.  Pt educated on energy conservation for ADLs. RN notified that pt had yellowish drainage from R side after mobility. Further OT is not needed.  Activity Tolerance: Patient tolerated treatment well  Discharge Recommendations: 24 hour supervision or assist    Plan   Occupational Therapy Plan  Times Per Week: no further OT     Restrictions  Restrictions/Precautions  Restrictions/Precautions: Up as Tolerated;Fall Risk  Position Activity Restriction  Other position/activity restrictions: chest tube    Subjective   Subjective  Subjective: Pt agreeable to OT  Pain: Denies pain  Orientation  Overall Orientation Status: Within Functional Limits  Cognition  Overall Cognitive Status: WFL        Objective    Vitals 115/57, HR 94, O2 sats 96% RA     Bed Mobility Training  Bed Mobility Training: Yes  Supine to Sit: Stand-by assistance;Additional time (HOB elevated)  Sit to Supine: Stand-by assistance  Scooting: Stand-by assistance (to EOB)  Balance  Sitting: Intact  Standing: Impaired  Standing - Static: Good  Standing - Dynamic: Good  Transfer Training  Transfer Training: Yes  Interventions: Safety awareness training;Verbal cues  Sit to Stand: Stand-by assistance (4WW)  Stand to Sit: Stand-by assistance  Stand Pivot Transfers: Stand-by assistance (4WW)  Toilet Transfer: Stand-by  assistance (SBA for BR mobility and toilet transfer with 4WW)     ADL  Feeding: Independent  Grooming: Supervision  UE Dressing: Stand by assistance  LE Dressing: Stand by assistance  Toileting: Independent    Safety Devices  Type of Devices: All fall risk precautions in place;Call light within reach;Left in bed;Nurse notified (RN notified of drainage from R mid abdomen)     Patient Education  Education Given To: Patient  Education Provided: Role of Therapy;Transfer Training;Equipment;Energy Conservation;Plan of Care;ADL Adaptive Strategies  Education Method: Verbal  Barriers to Learning: None  Education Outcome: Verbalized understanding;Demonstrated understanding  Disease Specific Education: Pt educated on importance of OOB mobility, prevention of complications of bedrest, and general safety during hospitalization. Pt verbalized understanding  Goals  Short Term Goals  Time Frame for Short Term Goals: Short term goals to be met by 4/23 unless otherwise specified--ALL GOALS MET 4/17  Short Term Goal 1: Pt will perform LB dressing w/ SPV by 4/21  Short Term Goal 2: Pt will perform toileting tasks mod I  Short Term Goal 3: Pt will tolerate 5 min stance at sink to perform grooming tasks w/ SPV  Short Term Goal 4: Pt will tolerate x15 BUE ther ex  Patient Goals   Patient goals : \"I'd like to get back home\"    AM-PAC - ADL  AM-PAC Daily Activity - Inpatient   How much help is needed for putting on and taking off regular lower body clothing?: A Little  How much help is needed for bathing (which includes washing, rinsing, drying)?: A Little  How much help is needed for toileting (which includes using toilet, bedpan, or urinal)?: None  How much help is needed for putting on and taking off regular upper body clothing?: None  How much help is needed for taking care of personal grooming?: None  How much help for eating meals?: None  AM-PAC Inpatient Daily Activity Raw Score: 22  AM-PAC Inpatient ADL T-Scale Score : 47.1  ADL

## 2024-04-17 NOTE — PLAN OF CARE
Problem: Discharge Planning  Goal: Discharge to home or other facility with appropriate resources  Outcome: Completed  Flowsheets (Taken 4/17/2024 0800)  Discharge to home or other facility with appropriate resources:   Identify barriers to discharge with patient and caregiver   Arrange for needed discharge resources and transportation as appropriate   Identify discharge learning needs (meds, wound care, etc)   Arrange for interpreters to assist at discharge as needed   Refer to discharge planning if patient needs post-hospital services based on physician order or complex needs related to functional status, cognitive ability or social support system     Problem: ABCDS Injury Assessment  Goal: Absence of physical injury  4/17/2024 1420 by Fahad Cardoza RN  Outcome: Completed  4/17/2024 1005 by Fahad Cardoza RN  Outcome: Progressing  Flowsheets (Taken 4/17/2024 1003)  Absence of Physical Injury: Implement safety measures based on patient assessment     Problem: Pain  Goal: Verbalizes/displays adequate comfort level or baseline comfort level  4/17/2024 1420 by Fahad Cardoza RN  Outcome: Completed  4/17/2024 1005 by Fahad Cardoza RN  Outcome: Progressing  Flowsheets (Taken 4/17/2024 0800)  Verbalizes/displays adequate comfort level or baseline comfort level:   Encourage patient to monitor pain and request assistance   Assess pain using appropriate pain scale   Administer analgesics based on type and severity of pain and evaluate response   Implement non-pharmacological measures as appropriate and evaluate response   Consider cultural and social influences on pain and pain management   Notify Licensed Independent Practitioner if interventions unsuccessful or patient reports new pain     Problem: Safety - Adult  Goal: Free from fall injury  4/17/2024 1420 by Fahad Cardoza RN  Outcome: Completed  4/17/2024 1005 by Fahad Cardoza RN  Outcome: Progressing     Problem: Skin/Tissue Integrity  Goal: Absence of new skin  breakdown  Description: 1.  Monitor for areas of redness and/or skin breakdown  2.  Assess vascular access sites hourly  3.  Every 4-6 hours minimum:  Change oxygen saturation probe site  4.  Every 4-6 hours:  If on nasal continuous positive airway pressure, respiratory therapy assess nares and determine need for appliance change or resting period.  Outcome: Completed     Problem: Skin/Tissue Integrity - Adult  Goal: Incisions, wounds, or drain sites healing without S/S of infection  4/17/2024 1420 by Fahad Cardoza RN  Outcome: Completed  4/17/2024 1005 by Fahad Cardoza, RN  Outcome: Progressing  Flowsheets  Taken 4/17/2024 1003  Incisions, Wounds, or Drain Sites Healing Without Sign and Symptoms of Infection:   ADMISSION and DAILY: Assess and document risk factors for pressure ulcer development   TWICE DAILY: Assess and document skin integrity   TWICE DAILY: Assess and document dressing/incision, wound bed, drain sites and surrounding tissue   Implement wound care per orders   Initiate isolation precautions as appropriate   Initiate pressure ulcer prevention bundle as indicated  Taken 4/17/2024 0800  Incisions, Wounds, or Drain Sites Healing Without Sign and Symptoms of Infection:   ADMISSION and DAILY: Assess and document risk factors for pressure ulcer development   TWICE DAILY: Assess and document skin integrity   TWICE DAILY: Assess and document dressing/incision, wound bed, drain sites and surrounding tissue   Implement wound care per orders   Initiate isolation precautions as appropriate   Initiate pressure ulcer prevention bundle as indicated     Problem: Genitourinary - Adult  Goal: Absence of urinary retention  Outcome: Completed

## 2024-04-17 NOTE — PROGRESS NOTES
Expand All Collapse All    Shift: [id number]     Admitting diagnosis: Pulmonary Nodule     Presentation to hospital: VATS     Surgery: yes - 4/15      Nursing assessment at handoff  stable     Emergency Contact/POA:Manohar Thrasher  Family updated: yes - visited     Most recent vitals: BP (!) 120/58   Pulse 82   Temp 98.3 °F (36.8 °C) (Oral)   Resp 12   Ht 1.626 m (5' 4\")   Wt 63.8 kg (140 lb 10.5 oz)   SpO2 100%   BMI 24.13 kg/m²       Rhythm: Normal Sinus Rhythm 80s      NC/HFNC- n/a  Respiratory support: - No ventilator support     Vent days: Day n/a     Increased O2 requirements: no     Admission weight Weight - Scale: 66.7 kg (147 lb)  Today's weight 68.2kg      Wt Readings from Last 1 Encounters:   04/16/24 63.8 kg (140 lb 10.5 oz)         UOP >30ml/hr: yes     Crowell need assessed each shift: no     Restraints: no  Order current and documentation up to date?     Lines/Drains  LDA Insertion Date Discontinued Date Dressing Changes   PIV   X2 4/15       TLC          Arterial   4/15  4/16     Crowell   4/16 4/16     Vas Cath         ETT          Surgical drains            Night Shift Hospitalist Interventions    Problem(Brief) Date Time Intervention Physician contacted                                                                              Drip rates at handoff:   Infusions Meds    sodium chloride              Hospital Course Daily Updates:  Admit Day# 0 04/15/24 Days  -VATS R middle lobectomy & lymph node dissection  -Admitted to ICU     Admit Day# 0 04/15/24 Nights  -noted urinary retension: crowell inserted  -CT output 156 ml     Admit Day #1 04/16/24 Days   -A-line removed  -Crowell removed  -worked with PT/OT, up in chair most of shift     Admit Day #1 4/16/24 Night:  -No significant overnight events   -Pt still experiencing post-op pain (requiring morphine); I discussed transitioning to PO pain medications.      Lab Data:   CBC:         Recent Labs     04/15/24  1152 04/17/24  0519   WBC 12.5* 7.6   HGB  .

## 2024-04-17 NOTE — PROGRESS NOTES
Physical Therapy  Facility/Department: [facility] C2 CARD TELEMETRY  Daily Treatment Note  NAME: Cristy Thrasher  : 1955  MRN: [id number]    Date of Service: 2024    Discharge Recommendations:  24 hour supervision or assist, Home with Home health PT   PT Equipment Recommendations  Equipment Needed: Yes  Mobility Devices: Walker  Walker: Rollator (4 Wheeled)  Other: Recommend rollator for home/community ambulation given unsteadiness and need for frequent seated rest breaks while amb.    Patient Diagnosis(es): The primary encounter diagnosis was Right middle lobe pulmonary nodule. A diagnosis of Pulmonary nodule was also pertinent to this visit.    Assessment   Assessment: Pt participated well with PT this afternoon, ambulating increased distance (75 feet) with support of walker and SBA/supervision.  Pt prefers use of rollator during gait training to help with balance and energy conservation.  Gait still appears slower and slightly more labored now than at baseline.  Recommend pt return home with 24-hr sup/assist from family (which they can provide), home PT services, and rollator for home use.  Activity Tolerance: Patient tolerated treatment well  Equipment Needed: Yes  Mobility Devices: Walker  Other: Recommend rollator for home/community ambulation given unsteadiness and need for frequent seated rest breaks while amb.     Plan    Physical Therapy Plan  General Plan: 3-5 times per week  Specific Instructions for Next Treatment: Progress ther ex and mobility as tolerated  Current Treatment Recommendations: Strengthening;ROM;Balance training;Gait training;Stair training;Home exercise program;Therapeutic activities;Safety education & training;Functional mobility training;Transfer training;Endurance training;Pain management;Equipment evaluation, education, & procurement     Restrictions  Restrictions/Precautions  Restrictions/Precautions: Up as Tolerated, Fall Risk  Required Braces or Orthoses?: No  Position  Treatments/Modalities: Pt assisted out of bathroom using 4WW with SBA/supervision.  Able to perform toilet sit>stand with supervision.  Managed sabiha care after toileting with (I).    Safety Devices  Type of Devices: All fall risk precautions in place;Call light within reach;Nurse notified;Left in chair;Gait belt;Heels elevated for pressure relief       Goals  Short Term Goals  Time Frame for Short Term Goals: 4/23/24 - all goals ongoing as of 4/17/24  Short Term Goal 1: Pt will ambulate 100ft with rollator and SBA  Short Term Goal 2: Pt will navigate 3 stairs without HR and CGA  Short Term Goal 3: Pt will complete 12-15 ther ex exercises  Short Term Goal 4: Pt will complete supine<>sit transfer with SBA  Patient Goals   Patient Goals : \"To go home\"    Education  Patient Education  Education Given To: Patient;Family (pt, , daughter)  Education Provided: Role of Therapy;Plan of Care;Family Education;Equipment;Transfer Training;Precautions;Energy Conservation  Education Provided Comments: Pt/family educated on current D/C and home safety recs as well as benefits of rollator for use during gait.  Pt/family verbalize understanding.  Education Method: Verbal;Demonstration  Barriers to Learning: None  Education Outcome: Verbalized understanding;Demonstrated understanding    AM-PAC - Mobility    AM-PAC Basic Mobility - Inpatient   How much help is needed turning from your back to your side while in a flat bed without using bedrails?: None  How much help is needed moving from lying on your back to sitting on the side of a flat bed without using bedrails?: A Little  How much help is needed moving to and from a bed to a chair?: A Little  How much help is needed standing up from a chair using your arms?: None  How much help is needed walking in hospital room?: A Little  How much help is needed climbing 3-5 steps with a railing?: A Little  AM-PAC Inpatient Mobility Raw Score : 20  AM-PAC Inpatient T-Scale Score :

## 2024-04-17 NOTE — CARE COORDINATION
CASE MANAGEMENT DISCHARGE SUMMARY      Discharge to: Home with [facility] (nurse, PT, OT) and family support    IMM given: 4/16     New Durable Medical Equipment ordered/agency: rollator, per PT recommendation.  Ordered, and provided by Raptmartha, brought to bedside by writer.    Transportation:    Family/car: Yes    Confirmed discharge plan with:     Patient: yes     Family:  yes, daughter Shruti and spouse     Facility/Agency, name:  [facility] to be pulled by Anuja/Ej , writer verified with her.    RN, name: GENO Simmons-LEE       
Discharge ordered, chart reviewed.  Writer met with pt's  and daughter Shruti bedside while pt sleeps, they agree with [facility] and would like to see pt get a rollator which is what PT recommended yesterday.  Family has no agency preference, but for sure wants pt seen before the weekend.  Writer gave referral to Anuja/Marylou , she will verify they have staff and update writer.  Writer spoke with Sierra/Vijaya, verified rollator on site for pt, will need PT note to state pt needs frequent, seated breaks in order for Medicare to pay 80% of cost.  Writer spoke with PT/SPT, they will work with pt and verify if they think she needs rollator (with seat) vs rolling walker.  Writer updated family.  SPENCER Restrepo         [id number] Addendum:  Marylou ORTIZ accepted pt.  SPENCER Restrepo   
others, and if so, who? No  Plans to Return to Present Housing: Yes  Other Identified Issues/Barriers to RETURNING to current housing: no  Potential Assistance needed at discharge: Home Care            Potential DME:    Patient expects to discharge to: House  Plan for transportation at discharge:      Financial    Payor: MEDICARE / Plan: MEDICARE PART A AND B / Product Type: *No Product type* /     Does insurance require precert for SNF: No    Potential assistance Purchasing Medications: No  Meds-to-Beds request:        [facility] [address] -  [phone] - F [phone]  [address] [facility] [phone]  Phone: [phone] Fax: [phone]      Notes:    Factors facilitating achievement of predicted outcomes Family support, motivated, cooperative    Barriers to discharge: Pain, Limited insight into deficits, Decreased endurance, and Upper extremity weakness    Additional Case Management Notes: Spoke with the pt's dtr and spouse at bedside. Pt had VATS today. SHe has 1 chest tube. IPTA, no Home O2 or DME. Will follow for needs.     The Plan for Transition of Care is related to the following treatment goals of Pulmonary nodule [R91.1]  Lung nodule [R91.1]  Right middle lobe pulmonary nodule [R91.1]    IF APPLICABLE: The Patient and/or patient representative Cristy and her family were provided with a choice of provider and agrees with the discharge plan. Freedom of choice list with basic dialogue that supports the patient's individualized plan of care/goals and shares the quality data associated with the providers was provided to:     Patient Representative Name:       The Patient and/or Patient Representative Agree with the Discharge Plan?      Prerna Crocker RN  Case Management Department

## 2024-04-17 NOTE — FLOWSHEET NOTE
04/16/24 2030   Assessment   Charting Type Shift assessment   Psychosocial   Psychosocial (WDL) WDL   Neurological   Neuro (WDL) WDL   Lani Coma Scale   Eye Opening 4   Best Verbal Response 5   Best Motor Response 6   Bartley Coma Scale Score 15   HEENT (Head, Ears, Eyes, Nose, & Throat)   HEENT (WDL) X   Right Eye Glasses   Left Eye Glasses   Teeth Dentures upper;Dentures lower   Respiratory   Respiratory (WDL) WDL   Subcutaneous Air/Crepitus Localized   Chest Tube Right Pleural 1   Placement Date/Time: 04/15/24 1054   Inserted by: DR. ESPITIA  Present on Admission/Arrival: No  Tube Size (fr): 28 fr  Chest Tube Orientation: Right  Chest Tube Location: Pleural  Tube Number: 1   Chest Tube Airleak No   Status Gravity   Suction To water seal   Y Connector Used No   Drainage Description Sanguinous   Dressing Status Old drainage noted  (reinforced)   Site Assessment Not assessed   Surrounding Skin Unable to view   Output (ml) 40 ml   Cardiac   Cardiac (WDL) WDL   Cardiac Rhythm Sinus rhythm   Gastrointestinal   Abdominal (WDL) WDL   Genitourinary   Genitourinary (WDL) WDL   Peripheral Vascular   Peripheral Vascular (WDL) WDL   Skin Integumentary    Skin Integumentary (WDL) X   Skin Condition/Temp Warm   Skin Integrity Incision (see LDA)   Location r chest   Musculoskeletal   Musculoskeletal (WDL) WDL   Incision 04/15/24 Abdomen Lateral;Right;Upper   Date First Assessed/Time First Assessed: 04/15/24 0837   Present on Original Admission: No  Location: Abdomen  Incision Location Orientation: Lateral;Right;Upper  Incision Description (Comments): 5 TROCAR SITES   Dressing Status Old drainage noted;Reinforced dressing;Dry;Intact   Drainage Amount Small (< 25%)

## 2024-04-17 NOTE — DISCHARGE INSTR - COC
Continuity of Care Form    Patient Name: Cristy Thrasher   :  1955  MRN:  [id number]    Admit date:  4/15/2024  Discharge date:  24    Code Status Order: Full Code   Advance Directives:     Admitting Physician:  Jose Manuel Amezcua MD  PCP: Rei Velarde MD    Discharging Nurse: Fahad HOWARD  Discharging Hospital Unit/Room#: 0237/0237-01  Discharging Unit Phone Number: [phone]    Emergency Contact:   Extended Emergency Contact Information  Primary Emergency Contact: Manohar Thrasher  Address: [address] [place] [address]  Home Phone: [phone]  Relation: Spouse  Secondary Emergency Contact: Shruti Thrasher  Home Phone: [phone]  Relation: Child    Past Surgical History:  Past Surgical History:   Procedure Laterality Date    BREAST LUMPECTOMY      BREAST LUMPECTOMY      CATARACT EXTRACTION      Pt has cataract in right eye but states has not been removed    COLONOSCOPY      COLONOSCOPY      GALLBLADDER SURGERY      HYSTERECTOMY (CERVIX STATUS UNKNOWN)  1980    SMALL INTESTINE SURGERY  1971    THORACOSCOPY Right 4/15/2024    RIGHT ROBOTIC MIDDLE LOBE  LOBECTOMY WITH LYMPH NODE DISSECTION WITH CRYOABLATION NERVE BLOCK LEVELS 5-8; INTERCOSTAL NERVE BLOCK LEVELS 5-8 performed by Jose Manuel Amezcua MD at [facility] OR    UTERUS SURGERY      cyst removal       Immunization History:     There is no immunization history on file for this patient.    Active Problems:  Patient Active Problem List   Diagnosis Code    Preop cardiovascular exam Z01.810    Precordial pain R07.2    SOB (shortness of breath) R06.02    Coronary artery disease involving native coronary artery of native heart without angina pectoris I25.10    Right middle lobe pulmonary nodule R91.1       Isolation/Infection:   Isolation            No Isolation          Patient Infection Status       None to display            Nurse Assessment:  Last Vital Signs: /65   Pulse 84   Temp  information only, NOT a DME order):  walker  Other Treatments:     Patient's personal belongings (please select all that are sent with patient):  Glasses    RN SIGNATURE:  Electronically signed by Fahad Cardoza RN on 4/17/24 at 11:30 AM EDT    CASE MANAGEMENT/SOCIAL WORK SECTION    Inpatient Status Date: 4/15/24    Readmission Risk Assessment Score:  Readmission Risk              Risk of Unplanned Readmission:  11           Discharging to Facility/ Agency   [facility]  Phone: [phone]  Fax [phone] [address] [place] [address]     / signature: Electronically signed by Omayra Wilkinson RN on 4/17/24 at 11:17 AM EDT    PHYSICIAN SECTION    Prognosis: Good    Condition at Discharge: Stable    Rehab Potential (if transferring to Rehab): Good    Recommended Labs or Other Treatments After Discharge: Surgical incision site assessment. PRN dry dressing changes    Physician Certification: I certify the above information and transfer of Cristy Thrasher  is necessary for the continuing treatment of the diagnosis listed and that she requires Home Care for less 30 days.     Update Admission H&P: No change in H&P    PHYSICIAN SIGNATURE:  Electronically signed by MELANIA Powell CNP on 4/17/24 at 10:30 AM EDT

## 2024-04-17 NOTE — PLAN OF CARE
Problem: Discharge Planning  Goal: Discharge to home or other facility with appropriate resources  Recent Flowsheet Documentation  Taken 4/17/2024 0800 by Fahad Cardoza, RN  Discharge to home or other facility with appropriate resources:   Identify barriers to discharge with patient and caregiver   Arrange for needed discharge resources and transportation as appropriate   Identify discharge learning needs (meds, wound care, etc)   Arrange for interpreters to assist at discharge as needed   Refer to discharge planning if patient needs post-hospital services based on physician order or complex needs related to functional status, cognitive ability or social support system  4/16/2024 2105 by Susan Erickson, RN  Outcome: Progressing     Problem: ABCDS Injury Assessment  Goal: Absence of physical injury  Outcome: Progressing  Flowsheets (Taken 4/17/2024 1003)  Absence of Physical Injury: Implement safety measures based on patient assessment     Problem: Pain  Goal: Verbalizes/displays adequate comfort level or baseline comfort level  Outcome: Progressing  Flowsheets (Taken 4/17/2024 0800)  Verbalizes/displays adequate comfort level or baseline comfort level:   Encourage patient to monitor pain and request assistance   Assess pain using appropriate pain scale   Administer analgesics based on type and severity of pain and evaluate response   Implement non-pharmacological measures as appropriate and evaluate response   Consider cultural and social influences on pain and pain management   Notify Licensed Independent Practitioner if interventions unsuccessful or patient reports new pain     Problem: Safety - Adult  Goal: Free from fall injury  Outcome: Progressing     Problem: Skin/Tissue Integrity - Adult  Goal: Incisions, wounds, or drain sites healing without S/S of infection  Outcome: Progressing  Flowsheets  Taken 4/17/2024 1003  Incisions, Wounds, or Drain Sites Healing Without Sign and Symptoms of Infection:

## 2024-04-17 NOTE — PLAN OF CARE
Problem: Discharge Planning  Goal: Discharge to home or other facility with appropriate resources  4/16/2024 2105 by Susan Erickson RN  Outcome: Progressing

## 2024-04-23 NOTE — DISCHARGE SUMMARY
Cardiac, Vascular & Thoracic Surgery  Discharge Summary    Patient:  Cristy Thrasher 1955 [id number]   Admission Date:  4/15/2024  5:27 AM  Discharge Date:  4/17/2024    Principle Diagnosis:  Pulmonary nodule    Secondary Diagnosis:  Principal Problem (Resolved):    Pulmonary nodule  Active Problems:    Right middle lobe pulmonary nodule  Resolved Problems:    Lung nodule    Procedure:  4/15/24 (Dr. Amezcua)   RIGHT ROBOTIC MIDDLE LOBE  LOBECTOMY WITH LYMPH NODE DISSECTION WITH CRYOABLATION NERVE BLOCK LEVELS 5-8; INTERCOSTAL NERVE BLOCK LEVELS 5-8    History:  We are asked to see this patient in consultation by Dr. Cool regarding RML pulm nodule.   Cristy Thrasher is a 68 y.o. female with PMH of HTN, hiatal hernia, arthritis, cataracts, hx tobacco abuse (quit 2 weeks ago, was smoking 1 pk/day; on Chantix) who presents with RML pulm nodule. PET avid. Denies SOB, CP. We were consulted for consideration of surgical intervention vs navigation bronch biopsy.     Hospital Course:  The patient underwent Robotic RML lobectomy w/ LND on 4/15/24 w/ Dr. Amezcua. Her post operative course was uncomplicated. She did have a small, but stable R apical ptx post op.  This remained unchanged s/p chest tube removal on 4/17.  The patient was discharged home w/ [facility] on 4/17/24.      Specimen: Right Middle lobe  Lymph nodes: 2R, 4R, 7, 9R, 11Rd on 4/17/2024    Discharged Condition: stable    Disposition:  home      Discharge Medications:     Medication List        START taking these medications      bisacodyl 5 MG EC tablet  Commonly known as: DULCOLAX  Take 1 tablet by mouth daily as needed for Constipation     methocarbamol 500 MG tablet  Commonly known as: ROBAXIN  Take 1 tablet by mouth 3 times daily for 10 days  Notes to patient: Methocarbamol (Robaxin®)  Use: calm the muscles, ease pain.  Side effects: feeling lightheaded, sleepy, having blurred eyesight, confusion      oxyCODONE 5 MG immediate release tablet  Commonly known as:

## 2024-04-26 ENCOUNTER — HOSPITAL ENCOUNTER (OUTPATIENT)
Dept: MRI IMAGING | Age: 69
Discharge: HOME | End: 2024-04-26
Attending: STUDENT IN AN ORGANIZED HEALTH CARE EDUCATION/TRAINING PROGRAM
Payer: MEDICARE

## 2024-04-26 DIAGNOSIS — C34.2 MALIGNANT NEOPLASM OF MIDDLE LOBE OF RIGHT LUNG (HCC): ICD-10-CM

## 2024-04-26 PROCEDURE — 70553 MRI BRAIN STEM W/O & W/DYE: CPT

## 2024-04-26 PROCEDURE — A9579 GAD-BASE MR CONTRAST NOS,1ML: HCPCS | Performed by: STUDENT IN AN ORGANIZED HEALTH CARE EDUCATION/TRAINING PROGRAM

## 2024-04-26 PROCEDURE — 6360000004 HC RX CONTRAST MEDICATION: Performed by: STUDENT IN AN ORGANIZED HEALTH CARE EDUCATION/TRAINING PROGRAM

## 2024-04-26 RX ADMIN — GADOTERIDOL 6.8 MMOL: 279.3 INJECTION, SOLUTION INTRAVENOUS at 09:45

## 2024-05-01 PROBLEM — C34.2 MALIGNANT NEOPLASM OF MIDDLE LOBE, BRONCHUS OR LUNG (HCC): Status: ACTIVE | Noted: 2024-03-18

## 2024-05-01 NOTE — PROGRESS NOTES

## 2024-05-04 ENCOUNTER — HOSPITAL ENCOUNTER (EMERGENCY)
Age: 69
Discharge: HOME | End: 2024-05-04
Attending: EMERGENCY MEDICINE
Payer: MEDICARE

## 2024-05-04 ENCOUNTER — APPOINTMENT (OUTPATIENT)
Dept: CT IMAGING | Age: 69
End: 2024-05-04
Payer: MEDICARE

## 2024-05-04 ENCOUNTER — APPOINTMENT (OUTPATIENT)
Dept: GENERAL RADIOLOGY | Age: 69
End: 2024-05-04
Payer: MEDICARE

## 2024-05-04 VITALS
HEIGHT: 61 IN | SYSTOLIC BLOOD PRESSURE: 150 MMHG | HEART RATE: 80 BPM | WEIGHT: 144 LBS | RESPIRATION RATE: 16 BRPM | BODY MASS INDEX: 27.19 KG/M2 | OXYGEN SATURATION: 96 % | TEMPERATURE: 98.1 F | DIASTOLIC BLOOD PRESSURE: 80 MMHG

## 2024-05-04 DIAGNOSIS — R07.9 CHEST PAIN, UNSPECIFIED TYPE: Primary | ICD-10-CM

## 2024-05-04 LAB
ALBUMIN SERPL-MCNC: 4 G/DL (ref 3.4–5)
ALBUMIN/GLOB SERPL: 1.4 {RATIO} (ref 1.1–2.2)
ALP SERPL-CCNC: 115 U/L (ref 40–129)
ALT SERPL-CCNC: 10 U/L (ref 10–40)
ANION GAP SERPL CALCULATED.3IONS-SCNC: 10 MMOL/L (ref 3–16)
AST SERPL-CCNC: 15 U/L (ref 15–37)
BASOPHILS # BLD: 0.1 K/UL (ref 0–0.2)
BASOPHILS NFR BLD: 0.9 %
BILIRUB SERPL-MCNC: <0.2 MG/DL (ref 0–1)
BUN SERPL-MCNC: 10 MG/DL (ref 7–20)
CALCIUM SERPL-MCNC: 9.2 MG/DL (ref 8.3–10.6)
CHLORIDE SERPL-SCNC: 105 MMOL/L (ref 99–110)
CO2 SERPL-SCNC: 27 MMOL/L (ref 21–32)
CREAT SERPL-MCNC: 0.7 MG/DL (ref 0.6–1.2)
DEPRECATED RDW RBC AUTO: 13.6 % (ref 12.4–15.4)
EKG ATRIAL RATE: 93 BPM
EKG DIAGNOSIS: NORMAL
EKG P AXIS: 42 DEGREES
EKG P-R INTERVAL: 152 MS
EKG Q-T INTERVAL: 376 MS
EKG QRS DURATION: 78 MS
EKG QTC CALCULATION (BAZETT): 467 MS
EKG R AXIS: -37 DEGREES
EKG T AXIS: -3 DEGREES
EKG VENTRICULAR RATE: 93 BPM
EOSINOPHIL # BLD: 0.3 K/UL (ref 0–0.6)
EOSINOPHIL NFR BLD: 3.4 %
GFR SERPLBLD CREATININE-BSD FMLA CKD-EPI: >90 ML/MIN/{1.73_M2}
GLUCOSE SERPL-MCNC: 117 MG/DL (ref 70–99)
HCT VFR BLD AUTO: 37.9 % (ref 36–48)
HGB BLD-MCNC: 13.1 G/DL (ref 12–16)
LYMPHOCYTES # BLD: 1.2 K/UL (ref 1–5.1)
LYMPHOCYTES NFR BLD: 13.5 %
MAGNESIUM SERPL-MCNC: 1.9 MG/DL (ref 1.8–2.4)
MCH RBC QN AUTO: 31.9 PG (ref 26–34)
MCHC RBC AUTO-ENTMCNC: 34.5 G/DL (ref 31–36)
MCV RBC AUTO: 92.5 FL (ref 80–100)
MONOCYTES # BLD: 0.8 K/UL (ref 0–1.3)
MONOCYTES NFR BLD: 8.5 %
NEUTROPHILS # BLD: 6.5 K/UL (ref 1.7–7.7)
NEUTROPHILS NFR BLD: 73.7 %
PLATELET # BLD AUTO: 244 K/UL (ref 135–450)
PMV BLD AUTO: 9.9 FL (ref 5–10.5)
POTASSIUM SERPL-SCNC: 3.2 MMOL/L (ref 3.5–5.1)
PROT SERPL-MCNC: 6.9 G/DL (ref 6.4–8.2)
RBC # BLD AUTO: 4.1 M/UL (ref 4–5.2)
SODIUM SERPL-SCNC: 142 MMOL/L (ref 136–145)
TROPONIN, HIGH SENSITIVITY: 15 NG/L (ref 0–14)
TROPONIN, HIGH SENSITIVITY: 16 NG/L (ref 0–14)
WBC # BLD AUTO: 8.9 K/UL (ref 4–11)

## 2024-05-04 PROCEDURE — 36415 COLL VENOUS BLD VENIPUNCTURE: CPT

## 2024-05-04 PROCEDURE — 71260 CT THORAX DX C+: CPT

## 2024-05-04 PROCEDURE — 83735 ASSAY OF MAGNESIUM: CPT

## 2024-05-04 PROCEDURE — 84484 ASSAY OF TROPONIN QUANT: CPT

## 2024-05-04 PROCEDURE — 93005 ELECTROCARDIOGRAM TRACING: CPT | Performed by: EMERGENCY MEDICINE

## 2024-05-04 PROCEDURE — 99285 EMERGENCY DEPT VISIT HI MDM: CPT

## 2024-05-04 PROCEDURE — 80053 COMPREHEN METABOLIC PANEL: CPT

## 2024-05-04 PROCEDURE — 71045 X-RAY EXAM CHEST 1 VIEW: CPT

## 2024-05-04 PROCEDURE — 6360000004 HC RX CONTRAST MEDICATION: Performed by: PHYSICIAN ASSISTANT

## 2024-05-04 PROCEDURE — 93010 ELECTROCARDIOGRAM REPORT: CPT | Performed by: INTERNAL MEDICINE

## 2024-05-04 PROCEDURE — 85025 COMPLETE CBC W/AUTO DIFF WBC: CPT

## 2024-05-04 RX ADMIN — IOPAMIDOL 75 ML: 755 INJECTION, SOLUTION INTRAVENOUS at 16:06

## 2024-05-04 ASSESSMENT — PAIN - FUNCTIONAL ASSESSMENT: PAIN_FUNCTIONAL_ASSESSMENT: 0-10

## 2024-05-04 ASSESSMENT — PAIN SCALES - GENERAL: PAINLEVEL_OUTOF10: 6

## 2024-05-04 NOTE — ED PROVIDER NOTES
ED Attending Attestation Note    I personally saw the patient and made/approved the management plan and take responsibility for patient management.     Briefly, 68 y.o. female presents with episode of chest pain.  She states is a pressure-like pain in the center of her chest.  She does have a significant history of lung cancer with lymph node involvement, recently diagnosed after patient underwent right robotic middle lobe lobectomy with lymph node dissection.  She did have a small apical pneumothorax postoperatively.  She states that her wounds from the procedure seem to be healing well and this pain feels different and she has never had anything like this before.     Focused exam:   Gen: 68 y.o. female, NAD  Heart is regular.  No significant murmur appreciated.  Lungs grossly clear.  She is talking full sentences without any respiratory distress    Imaging:   CT CHEST PULMONARY EMBOLISM W CONTRAST   Final Result   1. No evidence of pulmonary embolism.   2. Status post interval right middle lobectomy. Small right pleural effusion.   3. Mildly enlarged right paratracheal node measuring 1.3 cm in short axis   dimension new from 02/26/2024. This is nonspecific and could be reactive.   4. Mild cardiomegaly.         XR CHEST PORTABLE   Final Result   No acute abnormality            The Ekg interpreted by me shows  normal sinus rhythm with a rate of 93  Axis is leftward  QTc is  within an acceptable range  Intervals and Durations are unremarkable.      ST Segments: nonspecific changes  No significant change from prior EKG dated 4/11/24      MDM:   Patient presenting for evaluation of episode of chest pressure earlier today.  EKG without acute ischemic changes.  Initial troponin noted be 16, followed by repeat of 15.  She has not had any recurrence of her symptoms which have resolved spontaneously prior to my assessment.      Based on her history, we did consider possible PE as an etiology and CT pulmonary embolism study  was obtained and showed no evidence of PE.  There is now a mildly enlarged right paratracheal node noted and patient with follow-up in the next 2 days with oncology is to follow-up that finding with oncology.      She is scheduled for repeat echocardiogram later this month.  I did review her cardiac stress test which showed low risk scan with normal stress perfusion.  Given recent cardiac stress testing on 4/12/2024, with current presentation and findings on initial workup here, I have a lower suspicion for ACS although that was still a consideration, along with additional etiologies such as GI cause, musculoskeletal, or pulmonary.     After discussion of possible admission with the patient versus discharge with close outpatient follow-up, patient would prefer to be discharged home.  Based on shared decision-making, I felt that this was a reasonable approach, and patient will follow closely with oncology as well as primary care.  Reasons to return to the ER were discussed, including any recurrence of symptoms.  All questions answered at time of discharge      For further details of the patient's emergency department visit, please see the advanced practice provider's documentation.    Corina Stewart MD     This report has been produced using speech recognition software and may contain errors related to that system including errors in grammar, punctuation, and spelling, as well as words and phrases that may be inappropriate. If there are any questions or concerns please feel free to contact the dictating provider for clarification.       Corina Stewart MD  05/04/24 1327

## 2024-05-04 NOTE — DISCHARGE INSTRUCTIONS
Your CT scan of your chest showed   1. No evidence of pulmonary embolism.  2. Status post interval right middle lobectomy. Small right pleural effusion.  3. Mildly enlarged right paratracheal node measuring 1.3 cm in short axis  dimension new from 02/26/2024. This is nonspecific and could be reactive.  4. Mild cardiomegaly.

## 2024-05-04 NOTE — ED PROVIDER NOTES
I did not personally evaluate this patient but I was asked to review the EKG.     EKG  The Ekg interpreted by myself in the emergency department in the absence of a cardiologist.  normal sinus rhythm with a rate of 93  Axis is   Left axis deviation  QTc is  normal  Intervals and Durations are unremarkable.      No specific ST-T wave changes appreciated.  No evidence of acute ischemia.   No significant change from prior EKG dated 4/11/2024     Jacob Giron MD  05/04/24 7113

## 2024-05-04 NOTE — ED PROVIDER NOTES
[facility] ED  EMERGENCY DEPARTMENT ENCOUNTER        Pt Name: Cristy Thrasher  MRN: [id number]  Birthdate 1955  Date of evaluation: 5/4/2024  Provider: Rolando Taylor PA-C  PCP: Rei Velarde MD  Note Started: 4:21 PM EDT 5/4/24       I have seen and evaluated this patient with my supervising physician Corina Stewart MD.      CHIEF COMPLAINT       Chief Complaint   Patient presents with    Chest Pain       HISTORY OF PRESENT ILLNESS: 1 or more Elements     History From: patient     Limitations to history : None    Social Determinants Significantly Affecting Health : None    Chief Complaint: chest pain     Cristy Thrasher is a 68 y.o. female with a history of precordial pain hypertension hyperlipidemia and newly diagnosed malignant neoplasm of the right middle lobe (not currently on chemotherapy with pending chest port placement soon) who presents substernal chest pain that began about an hour prior to arrival lasted for about 20 minutes when she was sitting down associated with a feeling of hot.  She denies any sweats.  She denies any lightheadedness or dizziness associate with the symptoms.  She denies cough.  She reports a family history of cardiac disease particularly in her mother, father as well as brother.  She recently underwent a robotic right middle lobe lobectomy on April 15, 2024 and shortly discharged afterwards.  She is scheduled to see oncology shortly as well as a chest port placement.  Procedure pathology resulted in non-small cell cancer of the lung.  MRI of the head was negative during her workup.    Nursing Notes were all reviewed and agreed with or any disagreements were addressed in the HPI.    REVIEW OF SYSTEMS :      Review of Systems    Positives and Pertinent negatives as per HPI.     SURGICAL HISTORY     Past Surgical History:   Procedure Laterality Date    BREAST LUMPECTOMY  1984    BREAST LUMPECTOMY  1985    CATARACT EXTRACTION      Pt has cataract in right eye

## 2024-05-06 ENCOUNTER — ANESTHESIA EVENT (OUTPATIENT)
Dept: OPERATING ROOM | Age: 69
End: 2024-05-06
Payer: MEDICARE

## 2024-05-11 PROBLEM — Z01.810 PREOP CARDIOVASCULAR EXAM: Status: RESOLVED | Noted: 2024-04-11 | Resolved: 2024-05-11

## 2024-05-13 ENCOUNTER — HOSPITAL ENCOUNTER (OUTPATIENT)
Age: 69
Discharge: HOME | End: 2024-05-15
Attending: INTERNAL MEDICINE
Payer: MEDICARE

## 2024-05-13 ENCOUNTER — TELEPHONE (OUTPATIENT)
Dept: CARDIOLOGY CLINIC | Age: 69
End: 2024-05-13

## 2024-05-13 VITALS
WEIGHT: 144 LBS | HEIGHT: 61 IN | BODY MASS INDEX: 27.19 KG/M2 | DIASTOLIC BLOOD PRESSURE: 80 MMHG | SYSTOLIC BLOOD PRESSURE: 150 MMHG

## 2024-05-13 DIAGNOSIS — I31.39 PERICARDIAL EFFUSION: ICD-10-CM

## 2024-05-13 LAB
ECHO BSA: 1.68 M2
ECHO IVC PROX: 1.4 CM
ECHO LV E' LATERAL VELOCITY: 12 CM/S
ECHO LV E' SEPTAL VELOCITY: 8 CM/S
ECHO LV EDV A2C: 52 ML
ECHO LV EDV A4C: 79 ML
ECHO LV EDV INDEX A4C: 48 ML/M2
ECHO LV EDV NDEX A2C: 32 ML/M2
ECHO LV EJECTION FRACTION A2C: 71 %
ECHO LV EJECTION FRACTION A4C: 74 %
ECHO LV EJECTION FRACTION BIPLANE: 71 % (ref 55–100)
ECHO LV ESV A2C: 15 ML
ECHO LV ESV A4C: 21 ML
ECHO LV ESV INDEX A2C: 9 ML/M2
ECHO LV ESV INDEX A4C: 13 ML/M2
ECHO LV FRACTIONAL SHORTENING: 26 % (ref 28–44)
ECHO LV INTERNAL DIMENSION DIASTOLE INDEX: 2.32 CM/M2
ECHO LV INTERNAL DIMENSION DIASTOLIC: 3.8 CM (ref 3.9–5.3)
ECHO LV INTERNAL DIMENSION SYSTOLIC INDEX: 1.71 CM/M2
ECHO LV INTERNAL DIMENSION SYSTOLIC: 2.8 CM
ECHO LV IVSD: 1.2 CM (ref 0.6–0.9)
ECHO LV MASS 2D: 153.2 G (ref 67–162)
ECHO LV MASS INDEX 2D: 93.4 G/M2 (ref 43–95)
ECHO LV POSTERIOR WALL DIASTOLIC: 1.2 CM (ref 0.6–0.9)
ECHO LV RELATIVE WALL THICKNESS RATIO: 0.63
ECHO MV A VELOCITY: 0.97 M/S
ECHO MV E VELOCITY: 0.71 M/S
ECHO MV E/A RATIO: 0.73
ECHO MV E/E' LATERAL: 5.92
ECHO MV E/E' RATIO (AVERAGED): 7.4
ECHO MV E/E' SEPTAL: 8.88

## 2024-05-13 PROCEDURE — 93325 DOPPLER ECHO COLOR FLOW MAPG: CPT

## 2024-05-13 PROCEDURE — 93308 TTE F-UP OR LMTD: CPT | Performed by: INTERNAL MEDICINE

## 2024-05-13 PROCEDURE — 93325 DOPPLER ECHO COLOR FLOW MAPG: CPT | Performed by: INTERNAL MEDICINE

## 2024-05-13 NOTE — TELEPHONE ENCOUNTER
----- Message from Almas Greene MD sent at 5/13/2024  1:04 PM EDT -----  Please notify patient that their stress test and echo both look ok  Ok for surgery

## 2024-05-13 NOTE — TELEPHONE ENCOUNTER
Created telephone encounter. LMOM requesting a call back to the office. If pt needs a clearance letter sent, please get a valid fax number of where to send the letter.

## 2024-05-16 ENCOUNTER — HOSPITAL ENCOUNTER (OUTPATIENT)
Age: 69
Setting detail: OUTPATIENT SURGERY
Discharge: HOME | End: 2024-05-16
Attending: SURGERY | Admitting: SURGERY
Payer: MEDICARE

## 2024-05-16 ENCOUNTER — APPOINTMENT (OUTPATIENT)
Dept: GENERAL RADIOLOGY | Age: 69
End: 2024-05-16
Attending: SURGERY
Payer: MEDICARE

## 2024-05-16 ENCOUNTER — ANESTHESIA (OUTPATIENT)
Dept: OPERATING ROOM | Age: 69
End: 2024-05-16
Payer: MEDICARE

## 2024-05-16 VITALS
TEMPERATURE: 97.3 F | HEIGHT: 64 IN | DIASTOLIC BLOOD PRESSURE: 72 MMHG | RESPIRATION RATE: 12 BRPM | SYSTOLIC BLOOD PRESSURE: 136 MMHG | WEIGHT: 143 LBS | HEART RATE: 86 BPM | BODY MASS INDEX: 24.41 KG/M2 | OXYGEN SATURATION: 97 %

## 2024-05-16 DIAGNOSIS — C34.2 MALIGNANT NEOPLASM OF MIDDLE LOBE, BRONCHUS OR LUNG (HCC): Primary | ICD-10-CM

## 2024-05-16 PROCEDURE — 71045 X-RAY EXAM CHEST 1 VIEW: CPT

## 2024-05-16 PROCEDURE — 7100000011 HC PHASE II RECOVERY - ADDTL 15 MIN: Performed by: SURGERY

## 2024-05-16 PROCEDURE — 3700000000 HC ANESTHESIA ATTENDED CARE: Performed by: SURGERY

## 2024-05-16 PROCEDURE — 3600000002 HC SURGERY LEVEL 2 BASE: Performed by: SURGERY

## 2024-05-16 PROCEDURE — 3700000001 HC ADD 15 MINUTES (ANESTHESIA): Performed by: SURGERY

## 2024-05-16 PROCEDURE — 6360000002 HC RX W HCPCS: Performed by: SURGERY

## 2024-05-16 PROCEDURE — 2709999900 HC NON-CHARGEABLE SUPPLY: Performed by: SURGERY

## 2024-05-16 PROCEDURE — 2580000003 HC RX 258: Performed by: ANESTHESIOLOGY

## 2024-05-16 PROCEDURE — 7100000010 HC PHASE II RECOVERY - FIRST 15 MIN: Performed by: SURGERY

## 2024-05-16 PROCEDURE — 2580000003 HC RX 258: Performed by: SURGERY

## 2024-05-16 PROCEDURE — C1788 PORT, INDWELLING, IMP: HCPCS | Performed by: SURGERY

## 2024-05-16 PROCEDURE — 77001 FLUOROGUIDE FOR VEIN DEVICE: CPT

## 2024-05-16 PROCEDURE — A4216 STERILE WATER/SALINE, 10 ML: HCPCS | Performed by: ANESTHESIOLOGY

## 2024-05-16 PROCEDURE — 2500000003 HC RX 250 WO HCPCS: Performed by: SURGERY

## 2024-05-16 PROCEDURE — 77001 FLUOROGUIDE FOR VEIN DEVICE: CPT | Performed by: SURGERY

## 2024-05-16 PROCEDURE — A4217 STERILE WATER/SALINE, 500 ML: HCPCS | Performed by: SURGERY

## 2024-05-16 PROCEDURE — 36561 INSERT TUNNELED CV CATH: CPT | Performed by: SURGERY

## 2024-05-16 PROCEDURE — 6360000002 HC RX W HCPCS: Performed by: NURSE ANESTHETIST, CERTIFIED REGISTERED

## 2024-05-16 PROCEDURE — 2500000003 HC RX 250 WO HCPCS: Performed by: ANESTHESIOLOGY

## 2024-05-16 PROCEDURE — 2500000003 HC RX 250 WO HCPCS: Performed by: NURSE ANESTHETIST, CERTIFIED REGISTERED

## 2024-05-16 PROCEDURE — C1769 GUIDE WIRE: HCPCS | Performed by: SURGERY

## 2024-05-16 PROCEDURE — 3600000012 HC SURGERY LEVEL 2 ADDTL 15MIN: Performed by: SURGERY

## 2024-05-16 DEVICE — POWERPORT CLEARVUE ISP IMPLANTABLE PORT WITH ATTACHABLE 8F POLYURETHANE OPEN-ENDED SINGLE-LUMEN VENOUS CATHETER PROCEDURAL KIT
Type: IMPLANTABLE DEVICE | Site: SUBCLAVIAN | Status: FUNCTIONAL
Brand: POWERPORT CLEARVUE

## 2024-05-16 RX ORDER — LIDOCAINE HYDROCHLORIDE 20 MG/ML
INJECTION, SOLUTION INFILTRATION; PERINEURAL PRN
Status: DISCONTINUED | OUTPATIENT
Start: 2024-05-16 | End: 2024-05-16 | Stop reason: SDUPTHER

## 2024-05-16 RX ORDER — PROPOFOL 10 MG/ML
INJECTION, EMULSION INTRAVENOUS PRN
Status: DISCONTINUED | OUTPATIENT
Start: 2024-05-16 | End: 2024-05-16 | Stop reason: SDUPTHER

## 2024-05-16 RX ORDER — SODIUM CHLORIDE 0.9 % (FLUSH) 0.9 %
5-40 SYRINGE (ML) INJECTION EVERY 12 HOURS SCHEDULED
Status: DISCONTINUED | OUTPATIENT
Start: 2024-05-16 | End: 2024-05-16 | Stop reason: HOSPADM

## 2024-05-16 RX ORDER — OXYCODONE HYDROCHLORIDE 5 MG/1
10 TABLET ORAL PRN
Status: DISCONTINUED | OUTPATIENT
Start: 2024-05-16 | End: 2024-05-16 | Stop reason: HOSPADM

## 2024-05-16 RX ORDER — OXYCODONE HYDROCHLORIDE 5 MG/1
5 TABLET ORAL PRN
Status: DISCONTINUED | OUTPATIENT
Start: 2024-05-16 | End: 2024-05-16 | Stop reason: HOSPADM

## 2024-05-16 RX ORDER — MEPERIDINE HYDROCHLORIDE 25 MG/ML
12.5 INJECTION INTRAMUSCULAR; INTRAVENOUS; SUBCUTANEOUS EVERY 5 MIN PRN
Status: DISCONTINUED | OUTPATIENT
Start: 2024-05-16 | End: 2024-05-16 | Stop reason: HOSPADM

## 2024-05-16 RX ORDER — SODIUM CHLORIDE, SODIUM LACTATE, POTASSIUM CHLORIDE, CALCIUM CHLORIDE 600; 310; 30; 20 MG/100ML; MG/100ML; MG/100ML; MG/100ML
INJECTION, SOLUTION INTRAVENOUS CONTINUOUS
Status: DISCONTINUED | OUTPATIENT
Start: 2024-05-16 | End: 2024-05-16 | Stop reason: HOSPADM

## 2024-05-16 RX ORDER — SODIUM CHLORIDE 0.9 % (FLUSH) 0.9 %
5-40 SYRINGE (ML) INJECTION PRN
Status: DISCONTINUED | OUTPATIENT
Start: 2024-05-16 | End: 2024-05-16 | Stop reason: HOSPADM

## 2024-05-16 RX ORDER — NALOXONE HYDROCHLORIDE 0.4 MG/ML
INJECTION, SOLUTION INTRAMUSCULAR; INTRAVENOUS; SUBCUTANEOUS PRN
Status: DISCONTINUED | OUTPATIENT
Start: 2024-05-16 | End: 2024-05-16 | Stop reason: HOSPADM

## 2024-05-16 RX ORDER — HEPARIN 100 UNIT/ML
SYRINGE INTRAVENOUS PRN
Status: DISCONTINUED | OUTPATIENT
Start: 2024-05-16 | End: 2024-05-16 | Stop reason: ALTCHOICE

## 2024-05-16 RX ORDER — SODIUM CHLORIDE 9 MG/ML
INJECTION, SOLUTION INTRAVENOUS PRN
Status: DISCONTINUED | OUTPATIENT
Start: 2024-05-16 | End: 2024-05-16 | Stop reason: HOSPADM

## 2024-05-16 RX ORDER — HYDROCODONE BITARTRATE AND ACETAMINOPHEN 5; 325 MG/1; MG/1
1 TABLET ORAL EVERY 6 HOURS PRN
Qty: 12 TABLET | Refills: 0 | Status: SHIPPED | OUTPATIENT
Start: 2024-05-16 | End: 2024-05-19

## 2024-05-16 RX ORDER — ONDANSETRON 2 MG/ML
4 INJECTION INTRAMUSCULAR; INTRAVENOUS
Status: DISCONTINUED | OUTPATIENT
Start: 2024-05-16 | End: 2024-05-16 | Stop reason: HOSPADM

## 2024-05-16 RX ADMIN — PROPOFOL 30 MG: 10 INJECTION, EMULSION INTRAVENOUS at 12:18

## 2024-05-16 RX ADMIN — FAMOTIDINE 20 MG: 10 INJECTION, SOLUTION INTRAVENOUS at 10:30

## 2024-05-16 RX ADMIN — SODIUM CHLORIDE, POTASSIUM CHLORIDE, SODIUM LACTATE AND CALCIUM CHLORIDE: 600; 310; 30; 20 INJECTION, SOLUTION INTRAVENOUS at 10:29

## 2024-05-16 RX ADMIN — LIDOCAINE HYDROCHLORIDE 60 MG: 20 INJECTION, SOLUTION INFILTRATION; PERINEURAL at 12:11

## 2024-05-16 RX ADMIN — PROPOFOL 30 MG: 10 INJECTION, EMULSION INTRAVENOUS at 12:16

## 2024-05-16 RX ADMIN — PROPOFOL 20 MG: 10 INJECTION, EMULSION INTRAVENOUS at 12:23

## 2024-05-16 RX ADMIN — PROPOFOL 10 MG: 10 INJECTION, EMULSION INTRAVENOUS at 12:20

## 2024-05-16 RX ADMIN — PROPOFOL 50 MG: 10 INJECTION, EMULSION INTRAVENOUS at 12:11

## 2024-05-16 RX ADMIN — SODIUM CHLORIDE 2000 MG: 900 INJECTION INTRAVENOUS at 12:11

## 2024-05-16 ASSESSMENT — LIFESTYLE VARIABLES: SMOKING_STATUS: 1

## 2024-05-16 ASSESSMENT — PAIN - FUNCTIONAL ASSESSMENT: PAIN_FUNCTIONAL_ASSESSMENT: NONE - DENIES PAIN

## 2024-05-16 ASSESSMENT — ENCOUNTER SYMPTOMS: SHORTNESS OF BREATH: 1

## 2024-05-16 NOTE — BRIEF OP NOTE
Brief Postoperative Note      Patient: Cristy Thrasher  YOB: 1955  MRN: [id number]    Date of Procedure: 5/16/2024    Pre-Op Diagnosis Codes:     * Malignant neoplasm of middle lobe, bronchus or lung (HCC) [C34.2]    Post-Op Diagnosis: Same       Procedure(s):  PORT INSERTION    Surgeon(s):  Tariq Grant MD    Assistant:  Surgical Assistant: Juan Miguel Mendoza    Anesthesia: Monitor Anesthesia Care    Estimated Blood Loss (mL): Minimal    Complications: None    Specimens:   * No specimens in log *    Implants:  Implant Name Type Inv. Item Serial No.  Lot No. LRB No. Used Action   PORT INFUS SGL LUMN ATTCH POLYUR OPN END CATH 8FR POWERPRT - [id number]  PORT INFUS SGL LUMN ATTCH POLYUR OPN END CATH 8FR POWERPRT  [id number] 1 Implanted         Drains:   [REMOVED] Chest Tube Right Pleural 1 (Removed)   Chest Tube Airleak No 04/17/24 0600   Status Gravity 04/17/24 0000   Suction To water seal 04/17/24 0000   Y Connector Used No 04/17/24 0600   Drainage Description Serosanguinous 04/17/24 0600   Dressing Status Old drainage noted 04/17/24 0600   Chest Tube Dressing Dry 04/17/24 0000   Site Assessment Not assessed 04/17/24 0600   Surrounding Skin Unable to view 04/17/24 0600   Output (ml) 50 ml 04/17/24 0500       [REMOVED] Urinary Catheter 04/15/24 2 Way (Removed)       [REMOVED] Urinary Catheter 04/16/24 Hickman-Temperature (Removed)   $ Urethral catheter insertion $ Not inserted for procedure 04/16/24 0105   Catheter Indications Urinary retention (acute or chronic), continuous bladder irrigation or bladder outlet obstruction 04/16/24 0800   Site Assessment No urethral drainage 04/16/24 0800   Urine Color Yellow 04/16/24 0800   Urine Appearance Clear 04/16/24 0800   Collection Container Standard 04/16/24 0800   Securement Method Securing device (Describe) 04/16/24 0800   Catheter Care  Perineal wipes 04/16/24 0105   Catheter Best Practices  Drainage tube

## 2024-05-16 NOTE — OP NOTE
interrupted sutures of 3-0 Vicryl.  Fluoro showed the entire apparatus to be in good position.  There was excellent blood return and easy flush, and it was final flush.  The incision was closed with interrupted subcutaneous sutures of 3-0 Vicryl and a running subcuticular suture of 4-0 Vicryl followed by benzoin, Steri-Strips, and dry sterile dressings.  All sponge, needle, and instrument counts were correct at the end of the case.  The patient tolerated the procedure well.  She was taken to the recovery area in a stable condition.          JAC QUAN MD      D:  05/16/2024 12:43:28     T:  05/16/2024 12:53:35     [facility] [place], [place]/S  Job #:  [id number]     Doc#:  [id number]    CC:   Juan Miguel Tucker MD

## 2024-05-16 NOTE — ANESTHESIA POSTPROCEDURE EVALUATION
Department of Anesthesiology  Postprocedure Note    Patient: Cristy Thrasher  MRN: [id number]  YOB: 1955  Date of evaluation: 5/16/2024    Procedure Summary       Date: 05/16/24 Room / Location: [address]    Anesthesia Start: 1207 Anesthesia Stop: 1238    Procedure: PORT INSERTION (Left) Diagnosis:       Malignant neoplasm of middle lobe, bronchus or lung (HCC)      (Malignant neoplasm of middle lobe, bronchus or lung (HCC) [C34.2])    Surgeons: Tariq Grant MD Responsible Provider: Demond Welsh MD    Anesthesia Type: TIVA ASA Status: 3            Anesthesia Type: No value filed.    Elizabeth Phase I: Elizabeth Score: 10    Elizabeth Phase II:      Anesthesia Post Evaluation    Patient location during evaluation: PACU  Patient participation: complete - patient participated  Level of consciousness: awake and alert  Airway patency: patent  Nausea & Vomiting: no vomiting and no nausea  Cardiovascular status: hemodynamically stable  Respiratory status: acceptable  Hydration status: stable  Pain management: adequate    No notable events documented.

## 2024-05-16 NOTE — H&P
I have reviewed the progress note of Dr. Tucker  serving as history and physical dated April/30/2024 (scanned into media tab 5/1/2024) and examined the patient and find no relevant changes.    I have reviewed with the patient and/or family the risks, benefits, and alternatives to the procedure.

## 2024-05-16 NOTE — ANESTHESIA PRE PROCEDURE
Department of Anesthesiology  Preprocedure Note       Name:  Cristy Thrasher   Age:  68 y.o.  :  1955                                          MRN:  [id number]         Date:  2024      Surgeon: Surgeon(s):  Tariq Grant MD    Procedure: Procedure(s):  PORT INSERTION    Medications prior to admission:   Prior to Admission medications    Medication Sig Start Date End Date Taking? Authorizing Provider   esomeprazole Magnesium (NEXIUM) 20 MG PACK Take 1 packet by mouth daily    Mark Odom MD   Multiple Vitamins-Minerals (THERAPEUTIC MULTIVITAMIN-MINERALS) tablet Take 1 tablet by mouth daily    Mark Odom MD   levalbuterol (XOPENEX HFA) 45 MCG/ACT inhaler Inhale 2 puffs into the lungs every 4 hours as needed for Wheezing 24  James Cool MD   amLODIPine (NORVASC) 5 MG tablet Take 1 tablet by mouth daily    Mark Odom MD   hydroCHLOROthiazide 12.5 MG tablet Take 1 tablet by mouth daily 23   Mark Odom MD   atorvastatin (LIPITOR) 10 MG tablet Take 1 tablet by mouth nightly    Mark Odom MD   diphenhydrAMINE (BENADRYL) 25 MG capsule Take 1 capsule by mouth nightly as needed    Mark Odom MD   polyethylene glycol (GLYCOLAX) 17 GM/SCOOP powder Take 17 g by mouth daily    Mark Odom MD   Simethicone (GAS-X EXTRA STRENGTH) 125 MG CAPS Take 2 capsules by mouth as needed    Mark Odom MD   UNABLE TO FIND AIM Herbal Fiberblend    Mark Odom MD   fluticasone-umeclidin-vilant (TRELEGY ELLIPTA) 100-62.5-25 MCG/ACT AEPB inhaler Inhale 1 puff into the lungs daily 24   James Cool MD       Current medications:    Current Facility-Administered Medications   Medication Dose Route Frequency Provider Last Rate Last Admin    lactated ringers IV soln infusion   IntraVENous Continuous Demond Welsh  mL/hr at 24 1029 New Bag at 24 1029    sodium chloride flush 0.9 %

## 2024-05-16 NOTE — DISCHARGE INSTRUCTIONS
moderate to severe nausea or vomiting AND are unable to hold down fluids or prescribed medications.  Have bright red bloody drainage from your dressing that won't stop oozing.  Do not get relief with your pain medication    NORMAL (POSSIBLE) SIDE EFFECTS FROM ANESTHESIA:     Confusion, temporary memory loss, delayed reaction times in the first 24 hours  Lightheadedness, dizziness, difficulty focusing, blurred vision  Nausea/vomiting can happen  Shivering, feeling cold, sore throat, cough and muscle aches should stop within 24-48 hours  Trouble urinating - call your surgeon if it has been more than 8 hrs  Bruising or soreness at the IV site - call if it remains red, firm or there is drainage             FEMALES OF CHILDBEARING AGE WHO ARE TAKING BIRTH CONTROL PILLS:  You may have received a medication during your procedure that interferes with the   actions of birth control pills (Bridion or Emend). Use some other kind of birth control in addition to your pills, like a condom, for 1 month after your procedure to prevent unwanted pregnancy.    The following instructions are to be followed if you have a known history or diagnosis of sleep apnea:  For all sleep apnea patients:  ? Sleep on your side or sitting up in a chair whenever possible, especially the first 24 hours after surgery.  ? Use only medicines prescribed by your doctor.    ? Do not drink alcohol.  ? If you have a dental device to assist you while at rest, use it at all times for the first 24 hours.  For patients using CPAP machines:  ? Use your CPAP machine during all periods of sleep as usual.  ? Use your CPAP machine during all periods of daytime rest while on pain medicines.  ** Follow up with your primary care doctor for continued care.    IF YOU DO NOT TAKE ALL OF YOUR NARCOTIC PAIN MEDICATION, please dispose of them responsibly. There are drop off boxes in the Emergency Departments 24/7 at both [facility] and [place]. If these locations are not

## 2024-05-17 ENCOUNTER — TELEPHONE (OUTPATIENT)
Dept: CARDIOLOGY CLINIC | Age: 69
End: 2024-05-17

## 2024-05-17 NOTE — TELEPHONE ENCOUNTER
Called the phone number give, Shara , relayed message per [facility]. Pt v/u. She did not schedule at this time.    Private car

## 2024-05-17 NOTE — TELEPHONE ENCOUNTER
Follow up not necessary but happy to see if she would like to review tests in detail  No add-on as not urgent. Ok for surgery w/o being seen

## 2024-05-17 NOTE — TELEPHONE ENCOUNTER
PT daughter calling to check If Pt needs a follow up ov with mg? Pts daughter stated PT could not remember what [facility] advised when they called earlier this week. Pts daughter Shruti requesting a call back to schedule an ov if needed. Shruti ph# [phone]

## 2024-08-03 ENCOUNTER — HOSPITAL ENCOUNTER (OUTPATIENT)
Dept: CT IMAGING | Age: 69
Discharge: HOME | End: 2024-08-03
Attending: INTERNAL MEDICINE
Payer: MEDICARE

## 2024-08-03 DIAGNOSIS — C34.2 MALIGNANT NEOPLASM OF MIDDLE LOBE, BRONCHUS OR LUNG (HCC): ICD-10-CM

## 2024-08-03 LAB
PERFORMED ON: NORMAL
POC CREATININE: 1 MG/DL (ref 0.6–1.2)
POC SAMPLE TYPE: NORMAL

## 2024-08-03 PROCEDURE — 6360000004 HC RX CONTRAST MEDICATION: Performed by: INTERNAL MEDICINE

## 2024-08-03 PROCEDURE — 71260 CT THORAX DX C+: CPT

## 2024-08-03 PROCEDURE — 82565 ASSAY OF CREATININE: CPT

## 2024-08-03 RX ADMIN — IOPAMIDOL 75 ML: 755 INJECTION, SOLUTION INTRAVENOUS at 08:41

## 2024-08-03 NOTE — PROGRESS NOTES
Attempted to administer oral contrast, patient stated she was not able to keep it down due to her chemo treatments.

## 2024-08-22 ENCOUNTER — OFFICE VISIT (OUTPATIENT)
Dept: PULMONOLOGY | Age: 69
End: 2024-08-22

## 2024-08-22 VITALS
SYSTOLIC BLOOD PRESSURE: 120 MMHG | DIASTOLIC BLOOD PRESSURE: 78 MMHG | OXYGEN SATURATION: 95 % | WEIGHT: 142 LBS | HEIGHT: 63 IN | HEART RATE: 110 BPM | BODY MASS INDEX: 25.16 KG/M2

## 2024-08-22 DIAGNOSIS — J47.9 BRONCHIECTASIS WITHOUT COMPLICATION (HCC): ICD-10-CM

## 2024-08-22 DIAGNOSIS — J44.9 OBSTRUCTIVE AIRWAY DISEASE (HCC): ICD-10-CM

## 2024-08-22 DIAGNOSIS — R91.1 PULMONARY NODULE: Primary | ICD-10-CM

## 2024-08-22 RX ORDER — ONDANSETRON 4 MG/1
4 TABLET, FILM COATED ORAL EVERY 8 HOURS PRN
COMMUNITY

## 2024-08-22 RX ORDER — OXYCODONE HYDROCHLORIDE 5 MG/1
5 TABLET ORAL EVERY 4 HOURS PRN
COMMUNITY

## 2024-08-22 NOTE — PROGRESS NOTES
smoking about 47 years ago. She has a 47.2 pack-year smoking history. She has never used smokeless tobacco.  ETOH:   reports no history of alcohol use.      Family History:       Problem Relation Age of Onset    High Blood Pressure Mother     Diabetes Mother     Heart Disease Mother     High Blood Pressure Father     Heart Disease Father        Current Medications:    Current Outpatient Medications:     ondansetron (ZOFRAN) 4 MG tablet, Take 1 tablet by mouth every 8 hours as needed for Nausea or Vomiting, Disp: , Rfl:     oxyCODONE (ROXICODONE) 5 MG immediate release tablet, Take 1 tablet by mouth every 4 hours as needed for Pain. Max Daily Amount: 30 mg, Disp: , Rfl:     esomeprazole Magnesium (NEXIUM) 20 MG PACK, Take 1 packet by mouth daily, Disp: , Rfl:     Multiple Vitamins-Minerals (THERAPEUTIC MULTIVITAMIN-MINERALS) tablet, Take 1 tablet by mouth daily, Disp: , Rfl:     levalbuterol (XOPENEX HFA) 45 MCG/ACT inhaler, Inhale 2 puffs into the lungs every 4 hours as needed for Wheezing, Disp: 1 each, Rfl: 3    amLODIPine (NORVASC) 5 MG tablet, Take 1 tablet by mouth daily, Disp: , Rfl:     hydroCHLOROthiazide 12.5 MG tablet, Take 1 tablet by mouth daily, Disp: , Rfl:     atorvastatin (LIPITOR) 10 MG tablet, Take 1 tablet by mouth nightly, Disp: , Rfl:     diphenhydrAMINE (BENADRYL) 25 MG capsule, Take 1 capsule by mouth nightly as needed, Disp: , Rfl:     polyethylene glycol (GLYCOLAX) 17 GM/SCOOP powder, Take 17 g by mouth daily, Disp: , Rfl:     Simethicone (GAS-X EXTRA STRENGTH) 125 MG CAPS, Take 2 capsules by mouth as needed, Disp: , Rfl:     UNABLE TO FIND, AIM Herbal Fiberblend, Disp: , Rfl:     fluticasone-umeclidin-vilant (TRELEGY ELLIPTA) 100-62.5-25 MCG/ACT AEPB inhaler, Inhale 1 puff into the lungs daily, Disp: 60 each, Rfl: 5        Objective:   PHYSICAL EXAM:    /78   Pulse (!) 110   Ht 1.6 m (5' 3\")   Wt 64.4 kg (142 lb)   SpO2 90%   BMI 25.15 kg/m²   Gen: No distress.   Eyes: PERRL. No

## 2024-09-26 ENCOUNTER — APPOINTMENT (OUTPATIENT)
Dept: CT IMAGING | Age: 69
DRG: 871 | End: 2024-09-26
Payer: MEDICARE

## 2024-09-26 ENCOUNTER — APPOINTMENT (OUTPATIENT)
Dept: GENERAL RADIOLOGY | Age: 69
DRG: 871 | End: 2024-09-26
Payer: MEDICARE

## 2024-09-26 ENCOUNTER — HOSPITAL ENCOUNTER (INPATIENT)
Age: 69
LOS: 6 days | Discharge: HOME | DRG: 871 | End: 2024-10-02
Attending: EMERGENCY MEDICINE | Admitting: INTERNAL MEDICINE
Payer: MEDICARE

## 2024-09-26 DIAGNOSIS — E83.42 HYPOMAGNESEMIA: ICD-10-CM

## 2024-09-26 DIAGNOSIS — E87.6 HYPOKALEMIA: ICD-10-CM

## 2024-09-26 DIAGNOSIS — J44.9 CHRONIC OBSTRUCTIVE PULMONARY DISEASE, UNSPECIFIED COPD TYPE (HCC): ICD-10-CM

## 2024-09-26 DIAGNOSIS — Z85.9 HISTORY OF CANCER: ICD-10-CM

## 2024-09-26 DIAGNOSIS — U07.1 COVID: ICD-10-CM

## 2024-09-26 DIAGNOSIS — J18.9 PNEUMONIA DUE TO INFECTIOUS ORGANISM, UNSPECIFIED LATERALITY, UNSPECIFIED PART OF LUNG: Primary | ICD-10-CM

## 2024-09-26 DIAGNOSIS — R06.02 SHORTNESS OF BREATH: ICD-10-CM

## 2024-09-26 DIAGNOSIS — A41.9 SEPSIS, DUE TO UNSPECIFIED ORGANISM, UNSPECIFIED WHETHER ACUTE ORGAN DYSFUNCTION PRESENT (HCC): ICD-10-CM

## 2024-09-26 PROBLEM — Z72.0 TOBACCO ABUSE: Status: ACTIVE | Noted: 2024-09-26

## 2024-09-26 PROBLEM — E87.20 LACTIC ACIDOSIS: Status: ACTIVE | Noted: 2024-09-26

## 2024-09-26 PROBLEM — J47.9 BRONCHIECTASIS WITHOUT COMPLICATION (HCC): Status: ACTIVE | Noted: 2024-09-26

## 2024-09-26 LAB
ALBUMIN SERPL-MCNC: 3.2 G/DL (ref 3.4–5)
ALBUMIN/GLOB SERPL: 0.9 {RATIO} (ref 1.1–2.2)
ALP SERPL-CCNC: 200 U/L (ref 40–129)
ALT SERPL-CCNC: 17 U/L (ref 10–40)
ANION GAP SERPL CALCULATED.3IONS-SCNC: 17 MMOL/L (ref 3–16)
AST SERPL-CCNC: 19 U/L (ref 15–37)
BASE EXCESS BLDV CALC-SCNC: -3.7 MMOL/L (ref -3–3)
BASOPHILS # BLD: 0 K/UL (ref 0–0.2)
BASOPHILS NFR BLD: 0 %
BILIRUB SERPL-MCNC: 0.4 MG/DL (ref 0–1)
BUN SERPL-MCNC: 22 MG/DL (ref 7–20)
CALCIUM SERPL-MCNC: 8.9 MG/DL (ref 8.3–10.6)
CHLORIDE SERPL-SCNC: 103 MMOL/L (ref 99–110)
CO2 BLDV-SCNC: 19 MMOL/L
CO2 SERPL-SCNC: 18 MMOL/L (ref 21–32)
COHGB MFR BLDV: 1.1 % (ref 0–1.5)
CREAT SERPL-MCNC: 1.5 MG/DL (ref 0.6–1.2)
DEPRECATED RDW RBC AUTO: 14.9 % (ref 12.4–15.4)
EKG ATRIAL RATE: 134 BPM
EKG DIAGNOSIS: NORMAL
EKG P AXIS: 59 DEGREES
EKG P-R INTERVAL: 128 MS
EKG Q-T INTERVAL: 300 MS
EKG QRS DURATION: 76 MS
EKG QTC CALCULATION (BAZETT): 448 MS
EKG R AXIS: -20 DEGREES
EKG T AXIS: 22 DEGREES
EKG VENTRICULAR RATE: 134 BPM
EOSINOPHIL # BLD: 0 K/UL (ref 0–0.6)
EOSINOPHIL NFR BLD: 0 %
GFR SERPLBLD CREATININE-BSD FMLA CKD-EPI: 38 ML/MIN/{1.73_M2}
GLUCOSE SERPL-MCNC: 176 MG/DL (ref 70–99)
HCO3 BLDV-SCNC: 18.3 MMOL/L (ref 23–29)
HCT VFR BLD AUTO: 32.9 % (ref 36–48)
HGB BLD-MCNC: 11.3 G/DL (ref 12–16)
INR PPP: 1.23 (ref 0.85–1.15)
LACTATE BLDV-SCNC: 1.5 MMOL/L (ref 0.4–1.9)
LACTATE BLDV-SCNC: 1.9 MMOL/L (ref 0.4–2)
LACTATE BLDV-SCNC: 3.8 MMOL/L (ref 0.4–1.9)
LYMPHOCYTES # BLD: 0.9 K/UL (ref 1–5.1)
LYMPHOCYTES NFR BLD: 5 %
MAGNESIUM SERPL-MCNC: 1 MG/DL (ref 1.8–2.4)
MAGNESIUM SERPL-MCNC: 1.5 MG/DL (ref 1.8–2.4)
MCH RBC QN AUTO: 32.7 PG (ref 26–34)
MCHC RBC AUTO-ENTMCNC: 34.4 G/DL (ref 31–36)
MCV RBC AUTO: 95.3 FL (ref 80–100)
METHGB MFR BLDV: 0.3 %
MONOCYTES # BLD: 1.5 K/UL (ref 0–1.3)
MONOCYTES NFR BLD: 8 %
NEUTROPHILS # BLD: 16.4 K/UL (ref 1.7–7.7)
NEUTROPHILS NFR BLD: 83 %
NEUTS BAND NFR BLD MANUAL: 4 % (ref 0–7)
NT-PROBNP SERPL-MCNC: 1458 PG/ML (ref 0–124)
O2 CT VFR BLDV CALC: 16 VOL %
O2 THERAPY: ABNORMAL
PATH INTERP BLD-IMP: NO
PCO2 BLDV: 25 MMHG (ref 40–50)
PH BLDV: 7.48 [PH] (ref 7.35–7.45)
PLATELET # BLD AUTO: 199 K/UL (ref 135–450)
PLATELET BLD QL SMEAR: ADEQUATE
PMV BLD AUTO: 10.2 FL (ref 5–10.5)
PO2 BLDV: 91.4 MMHG (ref 25–40)
POTASSIUM SERPL-SCNC: 3 MMOL/L (ref 3.5–5.1)
POTASSIUM SERPL-SCNC: 3.2 MMOL/L (ref 3.5–5.1)
PROCALCITONIN SERPL IA-MCNC: 10.91 NG/ML (ref 0–0.15)
PROT SERPL-MCNC: 6.6 G/DL (ref 6.4–8.2)
PROTHROMBIN TIME: 15.7 SEC (ref 11.9–14.9)
RBC # BLD AUTO: 3.45 M/UL (ref 4–5.2)
REASON FOR REJECTION: NORMAL
REJECTED TEST: NORMAL
SAO2 % BLDV: 98 %
SARS-COV-2 RDRP RESP QL NAA+PROBE: DETECTED
SLIDE REVIEW: ABNORMAL
SODIUM SERPL-SCNC: 138 MMOL/L (ref 136–145)
TROPONIN, HIGH SENSITIVITY: 11 NG/L (ref 0–14)
TROPONIN, HIGH SENSITIVITY: 14 NG/L (ref 0–14)
TROPONIN, HIGH SENSITIVITY: 17 NG/L (ref 0–14)
WBC # BLD AUTO: 18.8 K/UL (ref 4–11)

## 2024-09-26 PROCEDURE — 85025 COMPLETE CBC W/AUTO DIFF WBC: CPT

## 2024-09-26 PROCEDURE — 6370000000 HC RX 637 (ALT 250 FOR IP): Performed by: NURSE PRACTITIONER

## 2024-09-26 PROCEDURE — 86606 ASPERGILLUS ANTIBODY: CPT

## 2024-09-26 PROCEDURE — 6370000000 HC RX 637 (ALT 250 FOR IP)

## 2024-09-26 PROCEDURE — 87641 MR-STAPH DNA AMP PROBE: CPT

## 2024-09-26 PROCEDURE — 82803 BLOOD GASES ANY COMBINATION: CPT

## 2024-09-26 PROCEDURE — 86612 BLASTOMYCES ANTIBODY: CPT

## 2024-09-26 PROCEDURE — 87635 SARS-COV-2 COVID-19 AMP PRB: CPT

## 2024-09-26 PROCEDURE — 71250 CT THORAX DX C-: CPT

## 2024-09-26 PROCEDURE — 93010 ELECTROCARDIOGRAM REPORT: CPT | Performed by: INTERNAL MEDICINE

## 2024-09-26 PROCEDURE — 99223 1ST HOSP IP/OBS HIGH 75: CPT | Performed by: INTERNAL MEDICINE

## 2024-09-26 PROCEDURE — 6360000002 HC RX W HCPCS

## 2024-09-26 PROCEDURE — 80053 COMPREHEN METABOLIC PANEL: CPT

## 2024-09-26 PROCEDURE — 84132 ASSAY OF SERUM POTASSIUM: CPT

## 2024-09-26 PROCEDURE — 83605 ASSAY OF LACTIC ACID: CPT

## 2024-09-26 PROCEDURE — 99285 EMERGENCY DEPT VISIT HI MDM: CPT

## 2024-09-26 PROCEDURE — 93005 ELECTROCARDIOGRAM TRACING: CPT | Performed by: EMERGENCY MEDICINE

## 2024-09-26 PROCEDURE — 2500000003 HC RX 250 WO HCPCS: Performed by: INTERNAL MEDICINE

## 2024-09-26 PROCEDURE — 96367 TX/PROPH/DG ADDL SEQ IV INF: CPT

## 2024-09-26 PROCEDURE — 2580000003 HC RX 258

## 2024-09-26 PROCEDURE — 96365 THER/PROPH/DIAG IV INF INIT: CPT

## 2024-09-26 PROCEDURE — 84145 PROCALCITONIN (PCT): CPT

## 2024-09-26 PROCEDURE — 6370000000 HC RX 637 (ALT 250 FOR IP): Performed by: INTERNAL MEDICINE

## 2024-09-26 PROCEDURE — 87040 BLOOD CULTURE FOR BACTERIA: CPT

## 2024-09-26 PROCEDURE — 2060000000 HC ICU INTERMEDIATE R&B

## 2024-09-26 PROCEDURE — 36415 COLL VENOUS BLD VENIPUNCTURE: CPT

## 2024-09-26 PROCEDURE — 86698 HISTOPLASMA ANTIBODY: CPT

## 2024-09-26 PROCEDURE — 84484 ASSAY OF TROPONIN QUANT: CPT

## 2024-09-26 PROCEDURE — 83880 ASSAY OF NATRIURETIC PEPTIDE: CPT

## 2024-09-26 PROCEDURE — 85610 PROTHROMBIN TIME: CPT

## 2024-09-26 PROCEDURE — 86635 COCCIDIOIDES ANTIBODY: CPT

## 2024-09-26 PROCEDURE — 99223 1ST HOSP IP/OBS HIGH 75: CPT

## 2024-09-26 PROCEDURE — 83735 ASSAY OF MAGNESIUM: CPT

## 2024-09-26 PROCEDURE — 2580000003 HC RX 258: Performed by: NURSE PRACTITIONER

## 2024-09-26 PROCEDURE — 2580000003 HC RX 258: Performed by: INTERNAL MEDICINE

## 2024-09-26 PROCEDURE — 6360000002 HC RX W HCPCS: Performed by: NURSE PRACTITIONER

## 2024-09-26 PROCEDURE — 71046 X-RAY EXAM CHEST 2 VIEWS: CPT

## 2024-09-26 RX ORDER — OXYCODONE HYDROCHLORIDE 5 MG/1
5 TABLET ORAL EVERY 4 HOURS PRN
Status: DISCONTINUED | OUTPATIENT
Start: 2024-09-26 | End: 2024-10-02 | Stop reason: HOSPADM

## 2024-09-26 RX ORDER — SODIUM CHLORIDE 0.9 % (FLUSH) 0.9 %
10 SYRINGE (ML) INJECTION PRN
Status: DISCONTINUED | OUTPATIENT
Start: 2024-09-26 | End: 2024-10-02 | Stop reason: HOSPADM

## 2024-09-26 RX ORDER — MAGNESIUM SULFATE IN WATER 40 MG/ML
2000 INJECTION, SOLUTION INTRAVENOUS ONCE
Status: COMPLETED | OUTPATIENT
Start: 2024-09-26 | End: 2024-09-27

## 2024-09-26 RX ORDER — ONDANSETRON 4 MG/1
4 TABLET, ORALLY DISINTEGRATING ORAL EVERY 8 HOURS PRN
Status: DISCONTINUED | OUTPATIENT
Start: 2024-09-26 | End: 2024-10-02 | Stop reason: HOSPADM

## 2024-09-26 RX ORDER — ENOXAPARIN SODIUM 100 MG/ML
40 INJECTION SUBCUTANEOUS DAILY
Status: DISCONTINUED | OUTPATIENT
Start: 2024-09-26 | End: 2024-10-02 | Stop reason: HOSPADM

## 2024-09-26 RX ORDER — POTASSIUM CHLORIDE 7.45 MG/ML
10 INJECTION INTRAVENOUS PRN
Status: DISCONTINUED | OUTPATIENT
Start: 2024-09-26 | End: 2024-10-02 | Stop reason: HOSPADM

## 2024-09-26 RX ORDER — POLYETHYLENE GLYCOL 3350 17 G/17G
17 POWDER, FOR SOLUTION ORAL DAILY PRN
Status: DISCONTINUED | OUTPATIENT
Start: 2024-09-26 | End: 2024-10-02 | Stop reason: HOSPADM

## 2024-09-26 RX ORDER — ACETAMINOPHEN 650 MG/1
650 SUPPOSITORY RECTAL EVERY 6 HOURS PRN
Status: DISCONTINUED | OUTPATIENT
Start: 2024-09-26 | End: 2024-10-02 | Stop reason: HOSPADM

## 2024-09-26 RX ORDER — NYSTATIN 100000 [USP'U]/ML
5 SUSPENSION ORAL 4 TIMES DAILY
Status: DISCONTINUED | OUTPATIENT
Start: 2024-09-26 | End: 2024-10-02 | Stop reason: HOSPADM

## 2024-09-26 RX ORDER — FLUCONAZOLE 100 MG/1
150 TABLET ORAL ONCE
Status: COMPLETED | OUTPATIENT
Start: 2024-09-26 | End: 2024-09-26

## 2024-09-26 RX ORDER — OLANZAPINE 2.5 MG/1
2.5 TABLET, FILM COATED ORAL NIGHTLY
COMMUNITY

## 2024-09-26 RX ORDER — SODIUM CHLORIDE 0.9 % (FLUSH) 0.9 %
5-40 SYRINGE (ML) INJECTION EVERY 12 HOURS SCHEDULED
Status: DISCONTINUED | OUTPATIENT
Start: 2024-09-26 | End: 2024-10-02 | Stop reason: HOSPADM

## 2024-09-26 RX ORDER — MULTIVITAMIN WITH IRON
100 TABLET ORAL DAILY
COMMUNITY

## 2024-09-26 RX ORDER — 0.9 % SODIUM CHLORIDE 0.9 %
30 INTRAVENOUS SOLUTION INTRAVENOUS ONCE
Status: COMPLETED | OUTPATIENT
Start: 2024-09-26 | End: 2024-09-26

## 2024-09-26 RX ORDER — ONDANSETRON 2 MG/ML
4 INJECTION INTRAMUSCULAR; INTRAVENOUS EVERY 6 HOURS PRN
Status: DISCONTINUED | OUTPATIENT
Start: 2024-09-26 | End: 2024-10-02 | Stop reason: HOSPADM

## 2024-09-26 RX ORDER — MAGNESIUM SULFATE IN WATER 40 MG/ML
2000 INJECTION, SOLUTION INTRAVENOUS ONCE
Status: COMPLETED | OUTPATIENT
Start: 2024-09-26 | End: 2024-09-26

## 2024-09-26 RX ORDER — ACETAMINOPHEN 325 MG/1
650 TABLET ORAL EVERY 6 HOURS PRN
Status: DISCONTINUED | OUTPATIENT
Start: 2024-09-26 | End: 2024-10-02 | Stop reason: HOSPADM

## 2024-09-26 RX ORDER — SODIUM CHLORIDE 9 MG/ML
INJECTION, SOLUTION INTRAVENOUS CONTINUOUS
Status: DISCONTINUED | OUTPATIENT
Start: 2024-09-26 | End: 2024-09-28

## 2024-09-26 RX ORDER — MAGNESIUM SULFATE 1 G/100ML
1000 INJECTION INTRAVENOUS PRN
Status: DISCONTINUED | OUTPATIENT
Start: 2024-09-26 | End: 2024-09-28

## 2024-09-26 RX ORDER — POTASSIUM CHLORIDE 1500 MG/1
40 TABLET, EXTENDED RELEASE ORAL PRN
Status: DISCONTINUED | OUTPATIENT
Start: 2024-09-26 | End: 2024-10-02 | Stop reason: HOSPADM

## 2024-09-26 RX ORDER — IOPAMIDOL 755 MG/ML
75 INJECTION, SOLUTION INTRAVASCULAR
Status: DISCONTINUED | OUTPATIENT
Start: 2024-09-26 | End: 2024-10-02 | Stop reason: HOSPADM

## 2024-09-26 RX ORDER — SODIUM CHLORIDE 9 MG/ML
INJECTION, SOLUTION INTRAVENOUS PRN
Status: DISCONTINUED | OUTPATIENT
Start: 2024-09-26 | End: 2024-10-02 | Stop reason: HOSPADM

## 2024-09-26 RX ORDER — LANOLIN ALCOHOL/MO/W.PET/CERES
1000 CREAM (GRAM) TOPICAL DAILY
COMMUNITY

## 2024-09-26 RX ADMIN — Medication 500000 UNITS: at 21:17

## 2024-09-26 RX ADMIN — ACETAMINOPHEN, ASPIRIN (NSAID) AND CAFFEINE 1 TABLET: 250; 250; 65 TABLET, FILM COATED ORAL at 21:43

## 2024-09-26 RX ADMIN — VANCOMYCIN HYDROCHLORIDE 1000 MG: 1 INJECTION, POWDER, LYOPHILIZED, FOR SOLUTION INTRAVENOUS at 16:21

## 2024-09-26 RX ADMIN — MAGNESIUM SULFATE HEPTAHYDRATE 2000 MG: 40 INJECTION, SOLUTION INTRAVENOUS at 16:30

## 2024-09-26 RX ADMIN — DOXYCYCLINE 100 MG: 100 INJECTION, POWDER, LYOPHILIZED, FOR SOLUTION INTRAVENOUS at 19:19

## 2024-09-26 RX ADMIN — SODIUM CHLORIDE 1641 ML: 9 INJECTION, SOLUTION INTRAVENOUS at 15:27

## 2024-09-26 RX ADMIN — POTASSIUM BICARBONATE 40 MEQ: 782 TABLET, EFFERVESCENT ORAL at 16:03

## 2024-09-26 RX ADMIN — ENOXAPARIN SODIUM 40 MG: 100 INJECTION SUBCUTANEOUS at 21:19

## 2024-09-26 RX ADMIN — SODIUM CHLORIDE: 9 INJECTION, SOLUTION INTRAVENOUS at 19:18

## 2024-09-26 RX ADMIN — FLUCONAZOLE 150 MG: 100 TABLET ORAL at 19:39

## 2024-09-26 RX ADMIN — POTASSIUM CHLORIDE 40 MEQ: 1500 TABLET, EXTENDED RELEASE ORAL at 22:42

## 2024-09-26 RX ADMIN — CEFEPIME 2000 MG: 2 INJECTION, POWDER, FOR SOLUTION INTRAVENOUS at 15:40

## 2024-09-26 RX ADMIN — ACETAMINOPHEN 650 MG: 325 TABLET ORAL at 22:14

## 2024-09-26 RX ADMIN — MAGNESIUM SULFATE HEPTAHYDRATE 2000 MG: 40 INJECTION, SOLUTION INTRAVENOUS at 22:46

## 2024-09-26 ASSESSMENT — PAIN DESCRIPTION - LOCATION: LOCATION: HEAD

## 2024-09-26 ASSESSMENT — PAIN DESCRIPTION - DESCRIPTORS: DESCRIPTORS: SHARP

## 2024-09-26 ASSESSMENT — PAIN - FUNCTIONAL ASSESSMENT
PAIN_FUNCTIONAL_ASSESSMENT: NONE - DENIES PAIN
PAIN_FUNCTIONAL_ASSESSMENT: ACTIVITIES ARE NOT PREVENTED

## 2024-09-26 ASSESSMENT — PAIN DESCRIPTION - FREQUENCY: FREQUENCY: INTERMITTENT

## 2024-09-26 ASSESSMENT — PAIN DESCRIPTION - PAIN TYPE: TYPE: ACUTE PAIN

## 2024-09-26 ASSESSMENT — PAIN DESCRIPTION - ONSET: ONSET: ON-GOING

## 2024-09-26 ASSESSMENT — PAIN SCALES - WONG BAKER: WONGBAKER_NUMERICALRESPONSE: HURTS A LITTLE BIT

## 2024-09-26 ASSESSMENT — PAIN SCALES - GENERAL: PAINLEVEL_OUTOF10: 5

## 2024-09-26 NOTE — ED NOTES
Pt transported to PCU by writer on bedside mobile monitor. Bedside report given to Claudia. Transfer of care at this time.

## 2024-09-26 NOTE — FLOWSHEET NOTE
09/26/24 1820   Vital Signs   Temp 99.1 °F (37.3 °C)   Temp Source Oral   Pulse (!) 130   Respirations 20   /67   MAP (Calculated) 88   Oxygen Therapy   SpO2 95 %   O2 Device None (Room air)     Patient admitted to room 301 from ER 5. Patient oriented to room, call light, bed rails, phone, lights and bathroom. Patient instructed about the schedule of the day including: vital sign frequency, lab draws, possible tests, frequency of MD and staff rounds, daily weights, I &O's and prescribed diet.  Telemetry box in place, patient aware of placement and reason. Bed locked, in lowest position, side rails up 2/4, call light within reach.        Recliner Assessment  Patient is able to demonstrate the ability to move from a reclining position to an upright position within the recliner.

## 2024-09-26 NOTE — H&P
Hospital Medicine History & Physical      PCP: Rei Velarde MD    Date of Admission: 9/26/2024    Date of Service: Pt seen/examined on 9/26/2024     Chief Complaint:    Chief Complaint   Patient presents with    Shortness of Breath     Pt with hx of lobectomy states that she has been feeling increasingly short of breath for the last two weeks, but that today when she woke up it was significantly worse. She also states that she is fatigued and having mid sternal chest pain when she coughs. Cough is productive and sometimes causes her to vomit.         History Of Present Illness:      The patient is a 69 y.o. female with pmhx of lung cancer s/p lobectomy, HTN who presented to [facility] ED with complaint of not feeling well for the past 10 days. Symptoms originally upper respiratory and then now with productive cough white/green phlegm.Has shortness of breath with exertion and is unable to walk far distances 2/2 to this. No chest pain but does have heart racing sensation. Did have temp of 100 at home. +Non-bloody emesis. No diarrhea. Has had poor PO intake 2/2 to not feeling well. Feels light-headed and weak with ambulation      was also sick with similar symptoms but only for a couple days.   Last immunotherapy treatment was about 3 weeks ago and she is suppose to have 2nd one on Monday.     Past Medical History:        Diagnosis Date    Arthritis     Cancer (HCC)     Cataract     Coronary artery disease involving native coronary artery of native heart without angina pectoris 04/11/2024    Hiatal hernia     Hyperlipidemia     Hypertension        Past Surgical History:        Procedure Laterality Date    BREAST LUMPECTOMY  1984    BREAST LUMPECTOMY  1985    CATARACT EXTRACTION      Pt has cataract in right eye but states has not been removed    COLONOSCOPY  2022    COLONOSCOPY  2014    GALLBLADDER SURGERY  2005    HYSTERECTOMY (CERVIX STATUS UNKNOWN)  1980    PORT SURGERY Left 5/16/2024    PORT

## 2024-09-26 NOTE — ED PROVIDER NOTES
Emergency Department Attending Provider Note  Location: [facility] ED  9/26/2024     Patient Identification  Cristy Thrasher is a 69 y.o. female      Cristy Thrasher was evaluated in the Emergency Department for acute on subacute shortness of breath chest discomfort with coughing in the setting of lung cancer status post lobectomy.  Productive cough.    HPI: as noted above    Physical Exam: Tachycardic 130s, tachypneic 20s, O2 low to mid 90s on room air.      EKG Interpretation  Documented by prior ER provider see additional note, sinus tachycardia      Patient seen and evaluated.  Relevant records reviewed.  Patient presents with symptoms noted above.  Presentation concerning for pneumonia/sepsis.  Tachycardic will start IV fluids broad-spectrum antibiotics.  Has leukocytosis and elevated lactic acid.  Also considered PE though pneumonia sepsis more likely.  Pending CT studies at this time.  Plan for admission    Although initial history and physical exam information was obtained by XENIA/NPP/MD/DO (who also dictated a record of this visit), I personally saw the patient and made/approved the management plan and take responsibility for patient management. I, Dr. Jaime, am the primary clinician of record.     I personally saw the patient and independently provided 30 minutes of non-concurrent critical care out of the total shared critical care time excluding separately billed procedures.    This chart was generated in part by using Dragon Dictation system and may contain errors related to that system including errors in grammar, punctuation, and spelling, as well as words and phrases that may be inappropriate. If there are any questions or concerns please feel free to contact the dictating provider for clarification.     Denny Jaime MD   [employer]        Denny Jaime MD  09/26/24 7415    
I have reviewed the below EKG. I was not otherwise involved in this patient's care.      EKG  The Ekg interpreted by myself  sinus tachycardia, kiwn=168  with a rate of 134  Axis is   Normal  QTc is  normal  Intervals and Durations are unremarkable.      No specific ST-T wave changes appreciated.  No evidence of acute ischemia.   When compared to the EKG from May 4, 2024 sinus tachycardia replaces normal sinus rhythm.  There are T wave inversions noted in lead III with T wave flattening in aVF.        Maddi Morgan MD  09/26/24 0938    
 Tenderness: There is abdominal tenderness in the epigastric area.   Musculoskeletal:         General: Normal range of motion.      Cervical back: Normal range of motion.   Skin:     General: Skin is warm and dry.   Neurological:      Mental Status: She is alert and oriented to person, place, and time.         DIAGNOSTIC RESULTS   LABS:    Labs Reviewed   COVID-19, RAPID - Abnormal; Notable for the following components:       Result Value    SARS-CoV-2, NAAT DETECTED (*)     All other components within normal limits   CBC WITH AUTO DIFFERENTIAL - Abnormal; Notable for the following components:    WBC 18.8 (*)     RBC 3.45 (*)     Hemoglobin 11.3 (*)     Hematocrit 32.9 (*)     Neutrophils Absolute 16.4 (*)     Lymphocytes Absolute 0.9 (*)     Monocytes Absolute 1.5 (*)     All other components within normal limits   COMPREHENSIVE METABOLIC PANEL W/ REFLEX TO MG FOR LOW K - Abnormal; Notable for the following components:    Potassium reflex Magnesium 3.0 (*)     CO2 18 (*)     Anion Gap 17 (*)     Glucose 176 (*)     BUN 22 (*)     Creatinine 1.5 (*)     Est, Glom Filt Rate 38 (*)     Albumin 3.2 (*)     Albumin/Globulin Ratio 0.9 (*)     Alkaline Phosphatase 200 (*)     All other components within normal limits   TROPONIN - Abnormal; Notable for the following components:    Troponin, High Sensitivity 17 (*)     All other components within normal limits   BRAIN NATRIURETIC PEPTIDE - Abnormal; Notable for the following components:    NT Pro-BNP 1,458 (*)     All other components within normal limits   PROTIME-INR - Abnormal; Notable for the following components:    Protime 15.7 (*)     INR 1.23 (*)     All other components within normal limits   PROCALCITONIN - Abnormal; Notable for the following components:    Procalcitonin 10.91 (*)     All other components within normal limits   LACTATE, SEPSIS - Abnormal; Notable for the following components:    Lactic Acid, Sepsis 3.8 (*)     All other components within normal

## 2024-09-27 ENCOUNTER — APPOINTMENT (OUTPATIENT)
Age: 69
DRG: 871 | End: 2024-09-27
Payer: MEDICARE

## 2024-09-27 PROBLEM — J18.9 MULTIFOCAL PNEUMONIA: Status: ACTIVE | Noted: 2024-09-27

## 2024-09-27 PROBLEM — N17.9 ACUTE KIDNEY INJURY (HCC): Status: ACTIVE | Noted: 2024-09-27

## 2024-09-27 PROBLEM — E43 SEVERE PROTEIN-CALORIE MALNUTRITION (HCC): Chronic | Status: ACTIVE | Noted: 2024-09-27

## 2024-09-27 PROBLEM — N17.9 AKI (ACUTE KIDNEY INJURY) (HCC): Status: ACTIVE | Noted: 2024-09-27

## 2024-09-27 PROBLEM — J96.01 ACUTE HYPOXEMIC RESPIRATORY FAILURE: Status: ACTIVE | Noted: 2024-09-27

## 2024-09-27 PROBLEM — T17.998A MUCUS PLUG IN RESPIRATORY TRACT: Status: ACTIVE | Noted: 2024-09-27

## 2024-09-27 LAB
ALBUMIN SERPL-MCNC: 2.7 G/DL (ref 3.4–5)
ALBUMIN/GLOB SERPL: 0.8 {RATIO} (ref 1.1–2.2)
ALP SERPL-CCNC: 188 U/L (ref 40–129)
ALT SERPL-CCNC: 15 U/L (ref 10–40)
ANION GAP SERPL CALCULATED.3IONS-SCNC: 13 MMOL/L (ref 3–16)
APPEARANCE BRONCH: ABNORMAL
AST SERPL-CCNC: 16 U/L (ref 15–37)
BACTERIA URNS QL MICRO: ABNORMAL /HPF
BASOPHILS # BLD: 0 K/UL (ref 0–0.2)
BASOPHILS NFR BLD: 0.3 %
BILIRUB SERPL-MCNC: 0.3 MG/DL (ref 0–1)
BILIRUB UR QL STRIP.AUTO: NEGATIVE
BUN SERPL-MCNC: 16 MG/DL (ref 7–20)
CALCIUM SERPL-MCNC: 8.2 MG/DL (ref 8.3–10.6)
CHLORIDE SERPL-SCNC: 108 MMOL/L (ref 99–110)
CLARITY UR: CLEAR
CLOT SPEC QL: ABNORMAL
CO2 SERPL-SCNC: 20 MMOL/L (ref 21–32)
COLOR BRONCH: ABNORMAL
COLOR UR: YELLOW
CREAT SERPL-MCNC: 1 MG/DL (ref 0.6–1.2)
DEPRECATED RDW RBC AUTO: 15.4 % (ref 12.4–15.4)
EOSINOPHIL # BLD: 0 K/UL (ref 0–0.6)
EOSINOPHIL NFR BLD: 0.3 %
EPI CELLS #/AREA URNS HPF: ABNORMAL /HPF (ref 0–5)
GFR SERPLBLD CREATININE-BSD FMLA CKD-EPI: 61 ML/MIN/{1.73_M2}
GLUCOSE BLD-MCNC: 144 MG/DL (ref 70–99)
GLUCOSE BLD-MCNC: 156 MG/DL (ref 70–99)
GLUCOSE SERPL-MCNC: 109 MG/DL (ref 70–99)
GLUCOSE UR STRIP.AUTO-MCNC: NEGATIVE MG/DL
HCT VFR BLD AUTO: 32.3 % (ref 36–48)
HGB BLD-MCNC: 11 G/DL (ref 12–16)
HGB UR QL STRIP.AUTO: NEGATIVE
KETONES UR STRIP.AUTO-MCNC: NEGATIVE MG/DL
LACTATE BLDV-SCNC: 1.3 MMOL/L (ref 0.4–2)
LEGIONELLA AG UR QL: NORMAL
LEUKOCYTE ESTERASE UR QL STRIP.AUTO: NEGATIVE
LYMPHOCYTES # BLD: 1 K/UL (ref 1–5.1)
LYMPHOCYTES NFR BLD: 6.7 %
LYMPHOCYTES NFR BRONCH MANUAL: 6 % (ref 5–10)
MACROPHAGES NFR BRONCH MANUAL: 4 % (ref 90–95)
MAGNESIUM SERPL-MCNC: 2.2 MG/DL (ref 1.8–2.4)
MCH RBC QN AUTO: 32.4 PG (ref 26–34)
MCHC RBC AUTO-ENTMCNC: 33.9 G/DL (ref 31–36)
MCV RBC AUTO: 95.5 FL (ref 80–100)
MONOCYTES # BLD: 1.2 K/UL (ref 0–1.3)
MONOCYTES NFR BLD: 8 %
MONOCYTES NFR BRONCH MANUAL: 11 %
MUCOUS THREADS #/AREA URNS LPF: ABNORMAL /LPF
NEUTROPHILS # BLD: 12.4 K/UL (ref 1.7–7.7)
NEUTROPHILS NFR BLD: 84.7 %
NEUTROPHILS NFR BRONCH MANUAL: 79 % (ref 5–10)
NITRITE UR QL STRIP.AUTO: NEGATIVE
NUC CELL # BRONCH MANUAL: 5646 /CUMM
PERFORMED ON: ABNORMAL
PERFORMED ON: ABNORMAL
PH UR STRIP.AUTO: 6 [PH] (ref 5–8)
PLATELET # BLD AUTO: 197 K/UL (ref 135–450)
PMV BLD AUTO: 9.5 FL (ref 5–10.5)
POTASSIUM SERPL-SCNC: 3.1 MMOL/L (ref 3.5–5.1)
PROT SERPL-MCNC: 6 G/DL (ref 6.4–8.2)
PROT UR STRIP.AUTO-MCNC: ABNORMAL MG/DL
RBC # BLD AUTO: 3.38 M/UL (ref 4–5.2)
RBC # FLD MANUAL: 9200 /CUMM
RBC #/AREA URNS HPF: ABNORMAL /HPF (ref 0–4)
RSV SOURCE: NORMAL
RSV SOURCE: NORMAL
S PNEUM AG UR QL: NORMAL
SODIUM SERPL-SCNC: 141 MMOL/L (ref 136–145)
SP GR UR STRIP.AUTO: 1.02 (ref 1–1.03)
TOTAL CELLS COUNTED BRONCH: 100
UA COMPLETE W REFLEX CULTURE PNL UR: ABNORMAL
UA DIPSTICK W REFLEX MICRO PNL UR: YES
URN SPEC COLLECT METH UR: ABNORMAL
UROBILINOGEN UR STRIP-ACNC: 0.2 E.U./DL
VANCOMYCIN SERPL-MCNC: 5.6 UG/ML
WBC # BLD AUTO: 14.6 K/UL (ref 4–11)
WBC #/AREA URNS HPF: ABNORMAL /HPF (ref 0–5)

## 2024-09-27 PROCEDURE — 87070 CULTURE OTHR SPECIMN AEROBIC: CPT

## 2024-09-27 PROCEDURE — 2709999900 HC NON-CHARGEABLE SUPPLY: Performed by: INTERNAL MEDICINE

## 2024-09-27 PROCEDURE — 2580000003 HC RX 258

## 2024-09-27 PROCEDURE — 36415 COLL VENOUS BLD VENIPUNCTURE: CPT

## 2024-09-27 PROCEDURE — 87102 FUNGUS ISOLATION CULTURE: CPT

## 2024-09-27 PROCEDURE — 85025 COMPLETE CBC W/AUTO DIFF WBC: CPT

## 2024-09-27 PROCEDURE — 2580000003 HC RX 258: Performed by: INTERNAL MEDICINE

## 2024-09-27 PROCEDURE — 31624 DX BRONCHOSCOPE/LAVAGE: CPT | Performed by: INTERNAL MEDICINE

## 2024-09-27 PROCEDURE — 6360000002 HC RX W HCPCS

## 2024-09-27 PROCEDURE — 6370000000 HC RX 637 (ALT 250 FOR IP): Performed by: INTERNAL MEDICINE

## 2024-09-27 PROCEDURE — 87116 MYCOBACTERIA CULTURE: CPT

## 2024-09-27 PROCEDURE — 0B9F8ZX DRAINAGE OF RIGHT LOWER LUNG LOBE, VIA NATURAL OR ARTIFICIAL OPENING ENDOSCOPIC, DIAGNOSTIC: ICD-10-PCS | Performed by: INTERNAL MEDICINE

## 2024-09-27 PROCEDURE — 89051 BODY FLUID CELL COUNT: CPT

## 2024-09-27 PROCEDURE — 86403 PARTICLE AGGLUT ANTBDY SCRN: CPT

## 2024-09-27 PROCEDURE — 87106 FUNGI IDENTIFICATION YEAST: CPT

## 2024-09-27 PROCEDURE — 2500000003 HC RX 250 WO HCPCS: Performed by: INTERNAL MEDICINE

## 2024-09-27 PROCEDURE — 87206 SMEAR FLUORESCENT/ACID STAI: CPT

## 2024-09-27 PROCEDURE — 7100000001 HC PACU RECOVERY - ADDTL 15 MIN: Performed by: INTERNAL MEDICINE

## 2024-09-27 PROCEDURE — 87631 RESP VIRUS 3-5 TARGETS: CPT

## 2024-09-27 PROCEDURE — 87186 SC STD MICRODIL/AGAR DIL: CPT

## 2024-09-27 PROCEDURE — 88305 TISSUE EXAM BY PATHOLOGIST: CPT

## 2024-09-27 PROCEDURE — 87449 NOS EACH ORGANISM AG IA: CPT

## 2024-09-27 PROCEDURE — 80053 COMPREHEN METABOLIC PANEL: CPT

## 2024-09-27 PROCEDURE — 88312 SPECIAL STAINS GROUP 1: CPT

## 2024-09-27 PROCEDURE — 7100000000 HC PACU RECOVERY - FIRST 15 MIN: Performed by: INTERNAL MEDICINE

## 2024-09-27 PROCEDURE — 6360000002 HC RX W HCPCS: Performed by: INTERNAL MEDICINE

## 2024-09-27 PROCEDURE — 94761 N-INVAS EAR/PLS OXIMETRY MLT: CPT

## 2024-09-27 PROCEDURE — 6370000000 HC RX 637 (ALT 250 FOR IP)

## 2024-09-27 PROCEDURE — 87632 RESP VIRUS 6-11 TARGETS: CPT

## 2024-09-27 PROCEDURE — 88112 CYTOPATH CELL ENHANCE TECH: CPT

## 2024-09-27 PROCEDURE — 3609010900 HC BRONCHOSCOPY THERAPUTIC ASPIRATION INITIAL: Performed by: INTERNAL MEDICINE

## 2024-09-27 PROCEDURE — 87252 VIRUS INOCULATION TISSUE: CPT

## 2024-09-27 PROCEDURE — 81001 URINALYSIS AUTO W/SCOPE: CPT

## 2024-09-27 PROCEDURE — 83735 ASSAY OF MAGNESIUM: CPT

## 2024-09-27 PROCEDURE — 87205 SMEAR GRAM STAIN: CPT

## 2024-09-27 PROCEDURE — 2700000000 HC OXYGEN THERAPY PER DAY

## 2024-09-27 PROCEDURE — 2000000000 HC ICU R&B

## 2024-09-27 PROCEDURE — 99152 MOD SED SAME PHYS/QHP 5/>YRS: CPT | Performed by: INTERNAL MEDICINE

## 2024-09-27 PROCEDURE — 87077 CULTURE AEROBIC IDENTIFY: CPT

## 2024-09-27 PROCEDURE — 87081 CULTURE SCREEN ONLY: CPT

## 2024-09-27 PROCEDURE — 87385 HISTOPLASMA CAPSUL AG IA: CPT

## 2024-09-27 PROCEDURE — 3609010800 HC BRONCHOSCOPY ALVEOLAR LAVAGE: Performed by: INTERNAL MEDICINE

## 2024-09-27 PROCEDURE — 80202 ASSAY OF VANCOMYCIN: CPT

## 2024-09-27 PROCEDURE — 99233 SBSQ HOSP IP/OBS HIGH 50: CPT | Performed by: INTERNAL MEDICINE

## 2024-09-27 PROCEDURE — 99291 CRITICAL CARE FIRST HOUR: CPT | Performed by: INTERNAL MEDICINE

## 2024-09-27 PROCEDURE — 31645 BRNCHSC W/THER ASPIR 1ST: CPT | Performed by: INTERNAL MEDICINE

## 2024-09-27 PROCEDURE — 83605 ASSAY OF LACTIC ACID: CPT

## 2024-09-27 RX ORDER — MUPIROCIN 20 MG/G
OINTMENT TOPICAL 2 TIMES DAILY
Status: DISCONTINUED | OUTPATIENT
Start: 2024-09-27 | End: 2024-10-01

## 2024-09-27 RX ORDER — PANTOPRAZOLE SODIUM 40 MG/1
40 TABLET, DELAYED RELEASE ORAL
Status: DISCONTINUED | OUTPATIENT
Start: 2024-09-28 | End: 2024-10-02 | Stop reason: HOSPADM

## 2024-09-27 RX ORDER — LANOLIN ALCOHOL/MO/W.PET/CERES
100 CREAM (GRAM) TOPICAL DAILY
Status: DISCONTINUED | OUTPATIENT
Start: 2024-09-27 | End: 2024-10-02 | Stop reason: HOSPADM

## 2024-09-27 RX ORDER — MIDAZOLAM HYDROCHLORIDE 5 MG/ML
INJECTION INTRAMUSCULAR; INTRAVENOUS PRN
Status: DISCONTINUED | OUTPATIENT
Start: 2024-09-27 | End: 2024-09-27 | Stop reason: ALTCHOICE

## 2024-09-27 RX ORDER — OLANZAPINE 5 MG/1
5 TABLET ORAL NIGHTLY
Status: DISCONTINUED | OUTPATIENT
Start: 2024-09-27 | End: 2024-10-02 | Stop reason: HOSPADM

## 2024-09-27 RX ORDER — FENTANYL CITRATE 50 UG/ML
INJECTION, SOLUTION INTRAMUSCULAR; INTRAVENOUS PRN
Status: DISCONTINUED | OUTPATIENT
Start: 2024-09-27 | End: 2024-09-27 | Stop reason: ALTCHOICE

## 2024-09-27 RX ORDER — ATORVASTATIN CALCIUM 10 MG/1
10 TABLET, FILM COATED ORAL NIGHTLY
Status: DISCONTINUED | OUTPATIENT
Start: 2024-09-27 | End: 2024-10-02 | Stop reason: HOSPADM

## 2024-09-27 RX ORDER — OXYCODONE HYDROCHLORIDE 5 MG/1
5 TABLET ORAL EVERY 4 HOURS PRN
Status: DISCONTINUED | OUTPATIENT
Start: 2024-09-27 | End: 2024-09-27 | Stop reason: SDUPTHER

## 2024-09-27 RX ORDER — UREA 10 %
1000 LOTION (ML) TOPICAL DAILY
Status: DISCONTINUED | OUTPATIENT
Start: 2024-09-27 | End: 2024-10-02 | Stop reason: HOSPADM

## 2024-09-27 RX ORDER — VORICONAZOLE 200 MG/1
200 TABLET, FILM COATED ORAL EVERY 12 HOURS SCHEDULED
Status: DISCONTINUED | OUTPATIENT
Start: 2024-09-27 | End: 2024-09-29

## 2024-09-27 RX ORDER — AMLODIPINE BESYLATE 5 MG/1
5 TABLET ORAL DAILY
Status: DISCONTINUED | OUTPATIENT
Start: 2024-09-27 | End: 2024-10-02 | Stop reason: HOSPADM

## 2024-09-27 RX ADMIN — CEFEPIME 2000 MG: 2 INJECTION, POWDER, FOR SOLUTION INTRAVENOUS at 17:12

## 2024-09-27 RX ADMIN — ENOXAPARIN SODIUM 40 MG: 100 INJECTION SUBCUTANEOUS at 11:17

## 2024-09-27 RX ADMIN — CEFEPIME 2000 MG: 2 INJECTION, POWDER, FOR SOLUTION INTRAVENOUS at 03:03

## 2024-09-27 RX ADMIN — ACETAMINOPHEN, ASPIRIN (NSAID) AND CAFFEINE 1 TABLET: 250; 250; 65 TABLET, FILM COATED ORAL at 21:32

## 2024-09-27 RX ADMIN — SODIUM CHLORIDE, PRESERVATIVE FREE 10 ML: 5 INJECTION INTRAVENOUS at 21:22

## 2024-09-27 RX ADMIN — SODIUM CHLORIDE: 9 INJECTION, SOLUTION INTRAVENOUS at 10:02

## 2024-09-27 RX ADMIN — DEXAMETHASONE 6 MG: 4 TABLET ORAL at 11:17

## 2024-09-27 RX ADMIN — VORICONAZOLE 200 MG: 200 TABLET ORAL at 11:17

## 2024-09-27 RX ADMIN — DOXYCYCLINE 100 MG: 100 INJECTION, POWDER, LYOPHILIZED, FOR SOLUTION INTRAVENOUS at 18:16

## 2024-09-27 RX ADMIN — AMLODIPINE BESYLATE 5 MG: 5 TABLET ORAL at 11:17

## 2024-09-27 RX ADMIN — VORICONAZOLE 200 MG: 200 TABLET ORAL at 21:22

## 2024-09-27 RX ADMIN — DOXYCYCLINE 100 MG: 100 INJECTION, POWDER, LYOPHILIZED, FOR SOLUTION INTRAVENOUS at 05:03

## 2024-09-27 RX ADMIN — POTASSIUM CHLORIDE 10 MEQ: 7.46 INJECTION, SOLUTION INTRAVENOUS at 06:27

## 2024-09-27 RX ADMIN — Medication 1000 MCG: at 11:17

## 2024-09-27 RX ADMIN — MUPIROCIN: 20 OINTMENT TOPICAL at 21:36

## 2024-09-27 RX ADMIN — ACETAMINOPHEN 650 MG: 650 SUPPOSITORY RECTAL at 06:34

## 2024-09-27 RX ADMIN — Medication 500000 UNITS: at 11:18

## 2024-09-27 RX ADMIN — POTASSIUM BICARBONATE 30 MEQ: 391 TABLET, EFFERVESCENT ORAL at 11:17

## 2024-09-27 RX ADMIN — OLANZAPINE 5 MG: 5 TABLET, FILM COATED ORAL at 21:22

## 2024-09-27 RX ADMIN — SODIUM CHLORIDE: 9 INJECTION, SOLUTION INTRAVENOUS at 22:08

## 2024-09-27 RX ADMIN — VANCOMYCIN HYDROCHLORIDE 1500 MG: 10 INJECTION, POWDER, LYOPHILIZED, FOR SOLUTION INTRAVENOUS at 10:03

## 2024-09-27 RX ADMIN — PYRIDOXINE HCL TAB 50 MG 100 MG: 50 TAB at 11:17

## 2024-09-27 ASSESSMENT — PAIN DESCRIPTION - LOCATION: LOCATION: HEAD

## 2024-09-27 ASSESSMENT — PAIN DESCRIPTION - DESCRIPTORS
DESCRIPTORS: ACHING
DESCRIPTORS: ACHING

## 2024-09-27 ASSESSMENT — PAIN - FUNCTIONAL ASSESSMENT: PAIN_FUNCTIONAL_ASSESSMENT: 0-10

## 2024-09-27 ASSESSMENT — PAIN SCALES - GENERAL: PAINLEVEL_OUTOF10: 7

## 2024-09-27 NOTE — PROCEDURES
PROCEDURE: Diagnostic and therapeutic bronchoscopy with BAL     The risks and benefits as well as alternatives to the procedure have been discussed with the patient and or family.  The patient and or next of kin understands and agrees to proceed.     DESCRIPTION OF PROCEDURE: A time out was taken.   Type of sedation used: Moderate Sedation:   Conscious sedation with 1.5 mg versed and 37.5 mcg fentanyl.    Patient was monitored continuously 1:1 throughout the entire procedure while sedation was administered      The scope was passed with ease via the mouth.  A complete airway inspection was performed.  No endobronchial lesions or foreign body were identified. There were moderate thick, purulent secretions throughout the bilateral airways, worse lower lobe with mucous plugs.  Saline injection followed by therapeutic aspiration was performed.  Washings were obtained throughout the airways.  A Bronchoalveolar lavage was obtained from the right lower lobe with good return.        The patient tolerated the procedure well. Estimated blood loss was less than 5 ml.   Recovery will be per endoscopy protocol.    FOLLOW UP:  Cultures and cytology in  three to five days.

## 2024-09-27 NOTE — FLOWSHEET NOTE
09/26/24 2204   Vital Signs   Temp (!) 103.1 °F (39.5 °C)   Temp Source Oral   Pulse (!) 135   Heart Rate Source Monitor   Respirations 23   BP (!) 157/73   MAP (Calculated) 101   BP Location Left upper arm   BP Method Automatic   Patient Position Semi fowlers   Opioid-Induced Sedation   POSS Score 1   RASS   Valente Agitation Sedation Scale (RASS) 0   Oxygen Therapy   SpO2 94 %   O2 Device None (Room air)     Assessment done -- in flowsheet.  Patient is alert and oriented x4.  Instructed NPO at midnight for bronch shade.  Warm to touch - Tylenol given.  Call light within reach.

## 2024-09-27 NOTE — PLAN OF CARE
Problem: Safety - Adult  Goal: Free from fall injury  Outcome: Progressing  Flowsheets (Taken 9/27/2024 0044)  Free From Fall Injury: Instruct family/caregiver on patient safety     Problem: Pain  Goal: Verbalizes/displays adequate comfort level or baseline comfort level  Outcome: Progressing  Flowsheets (Taken 9/27/2024 0044)  Verbalizes/displays adequate comfort level or baseline comfort level: Encourage patient to monitor pain and request assistance     Problem: Respiratory - Adult  Goal: Achieves optimal ventilation and oxygenation  Outcome: Progressing  Flowsheets (Taken 9/27/2024 0044)  Achieves optimal ventilation and oxygenation: Assess for changes in respiratory status     Problem: Cardiovascular - Adult  Goal: Absence of cardiac dysrhythmias or at baseline  Outcome: Progressing  Flowsheets (Taken 9/27/2024 0044)  Absence of cardiac dysrhythmias or at baseline: Monitor cardiac rate and rhythm     Problem: Infection - Adult  Goal: Absence of infection at discharge  Outcome: Progressing  Flowsheets (Taken 9/27/2024 0044)  Absence of infection at discharge:   Assess and monitor for signs and symptoms of infection   Monitor lab/diagnostic results

## 2024-09-27 NOTE — FLOWSHEET NOTE
09/27/24 0310   Vital Signs   Temp 98.6 °F (37 °C)   Temp Source Oral   Pulse (!) 115   Heart Rate Source Monitor   Respirations 21   /67   MAP (Calculated) 89   BP Location Right upper arm   BP Method Automatic   Patient Position Semi fowlers   Opioid-Induced Sedation   POSS Score 1   RASS   Valente Agitation Sedation Scale (RASS) 0   Oxygen Therapy   SpO2 95 %   O2 Device None (Room air)     Patient is asleep but easy to wake up.  On room air with normal breathing pattern.  Assisted to bedside commode - sent urine sample.  Assisted back to bed.  Call light within reach.

## 2024-09-27 NOTE — CONSULTS
[facility]    Consult Note      Requesting Physician:  MARIA EUGENIA Alba    CHIEF COMPLAINT:  SOB      HISTORY OF PRESENT ILLNESS:    Ms. Thrasher  is a 69 y.o. female we are seeing in consultation for  lung cancer. She has stage III lung cancer and sees Dr. Tucker. She is s/p surgery and completed adjuvant chemotherapy 7/2024. She is now on maintenance immunotherapy with pembrolizumab. She is admitted with pneumonia and COVID.       ICD-10-CM    1. Pneumonia due to infectious organism, unspecified laterality, unspecified part of lung  J18.9       2. Shortness of breath  R06.02 Echo (TTE) limited (PRN contrast/bubble/strain/3D)     Echo (TTE) limited (PRN contrast/bubble/strain/3D)     CANCELED: Echo (TTE) complete (PRN contrast/bubble/strain/3D)     CANCELED: Echo (TTE) complete (PRN contrast/bubble/strain/3D)      3. History of cancer  Z85.9       4. Sepsis, due to unspecified organism, unspecified whether acute organ dysfunction present (HCC)  A41.9       5. Hypomagnesemia  E83.42       6. Hypokalemia  E87.6       7. COVID  U07.1              Past Medical History:  Past Medical History:   Diagnosis Date    Arthritis     Cancer (HCC)     Cataract     Chronic obstructive pulmonary disease (HCC) 9/26/2024    Coronary artery disease involving native coronary artery of native heart without angina pectoris 04/11/2024    Hiatal hernia     Hyperlipidemia     Hypertension        Past Surgical History:  Past Surgical History:   Procedure Laterality Date    BREAST LUMPECTOMY  1984    BREAST LUMPECTOMY  1985    BRONCHOSCOPY N/A 9/27/2024    BRONCH NF performed by James Cool MD at [facility] ENDOSCOPY    BRONCHOSCOPY  9/27/2024    BRONCHOSCOPY THERAPEUTIC ASPIRATION INITIAL performed by James Cool MD at [facility] ENDOSCOPY    CATARACT EXTRACTION      Pt has cataract in right eye but states has 
Pulmonary & Critical Care Consultation Note    Patient is being seen at the request of Afsaneh Munroe PA  for a consultation for sepsis, pneumonia, concerns for septic shock    HISTORY OF PRESENT ILLNESS:   69 years old presented with shortness of breath.  Duration for 2 weeks.  Associate with cough.  Severe this am. Cough with clear sputum. No hemoptysis. Low grade fever. In ED tachypneic tachycardic heart rate in the 130s.  Currently undergoing immunotherapy. Still smoking. No diarrhea. Vomiting after cough. No home O2.     PAST MEDICAL HISTORY:  Past Medical History:   Diagnosis Date    Arthritis     Cancer (HCC)     Cataract     Chronic obstructive pulmonary disease (HCC) 9/26/2024    Coronary artery disease involving native coronary artery of native heart without angina pectoris 04/11/2024    Hiatal hernia     Hyperlipidemia     Hypertension      PAST SURGICAL HISTORY:  Past Surgical History:   Procedure Laterality Date    BREAST LUMPECTOMY  1984    BREAST LUMPECTOMY  1985    CATARACT EXTRACTION      Pt has cataract in right eye but states has not been removed    COLONOSCOPY  2022    COLONOSCOPY  2014    GALLBLADDER SURGERY  2005    HYSTERECTOMY (CERVIX STATUS UNKNOWN)  1980    PORT SURGERY Left 5/16/2024    PORT INSERTION performed by Tariq Grant MD at [facility] [place]    SMALL INTESTINE SURGERY  1971    THORACOSCOPY Right 4/15/2024    RIGHT ROBOTIC MIDDLE LOBE  LOBECTOMY WITH LYMPH NODE DISSECTION WITH CRYOABLATION NERVE BLOCK LEVELS 5-8; INTERCOSTAL NERVE BLOCK LEVELS 5-8 performed by Jose Manuel Amezcua MD at [facility] OR    UTERUS SURGERY  1978    cyst removal       FAMILY HISTORY:  family history includes Diabetes in her mother; Heart Disease in her father and mother; High Blood Pressure in her father and mother.    SOCIAL HISTORY:   reports that she quit smoking about 6 months ago. Her smoking use included cigarettes. She started smoking about 47 years ago. She has a 47.2 pack-year smoking history. She has 
[facility]   Pharmacy Pharmacokinetic Monitoring Service - Vancomycin     Cristy Thrasher is a 69 y.o. female starting on vancomycin therapy for Pneumonia for a duration of 7 days. Pharmacy consulted by MARIA EUGENIA Alba for monitoring and adjustment.    Target Concentration: Goal AUC/-600 mg*hr/L    Additional Antimicrobials: Cefepime, doxycyline     Pertinent Laboratory Values:   Wt Readings from Last 1 Encounters:   09/26/24 (S) 56.4 kg (124 lb 6.4 oz)     Temp Readings from Last 1 Encounters:   09/26/24 98.1 °F (36.7 °C) (Oral)     Estimated Creatinine Clearance: 31 mL/min (A) (based on SCr of 1.5 mg/dL (H)).  Recent Labs     09/26/24  1441   CREATININE 1.5*   BUN 22*   WBC 18.8*     Procalcitonin: 10.91    MRSA Nasal Swab: was ordered by provider, awaiting results.    Plan:  Dosing recommendations based on Bayesian software  Vancomycin loading dose of 1000 mg given 09/26/24 @ 1530  Patient will be pulse dosed based on renal labs due to low weight of 56.4 kg and CrCl of 31 mL/min.  Renal labs as indicated   Vancomycin random concentration ordered for 09/27 @ 1430   Pharmacy will continue to monitor patient and adjust therapy as indicated    Thank you for the consult,  Allie Irving RPH  9/26/2024 7:12 PM    
tachy recommended by rads. would  like ct pe but she has an josh  TECHNOLOGIST PROVIDED HISTORY:  Reason for exam:->inc sob tachy recommended by rads. would like ct pe but she  has an josh  Decision Support Exception - unselect if not a suspected or confirmed  emergency medical condition->Emergency Medical Condition (MA)  Reason for Exam: sob    FINDINGS:  Mediastinum: Ascending thoracic aorta is nonaneurysmal.  Main pulmonary  artery mildly prominent at 3.1 cm.  Mediastinal lymphadenopathy with AP  window nodes measure up to 1.5 cm and precarinal nodes measure up to 1.5 cm.  Heart and pericardium appear stable.    Lungs/pleura: Multiple bilateral pulmonary nodules as well as pneumonic  infiltrates.  Most pronounced pneumonic infiltrate in the right lower lobe  noted and not previously seen.  Less pronounced infiltrate in the left lower  lobe and lingula.  Multiple bilateral pulmonary nodules not seen on August 3,  2024.  Nodularity in the left upper lobe with nodes measuring up to 5.4 mm  and 4.9 mm.  A larger nodule in the lower anterolateral aspect of the left  upper lobe measuring 1.6 cm.  Lingular nodule measures 1.1 cm.  Left lower  lobe mass measures 1.5 cm and 2nd mass 1.6 cm.  Right upper lobe nodule  measures 8.1 mm.  Right lower lobe nodule measures 8 mm and 1.1 cm.  No  significant pleural disease.  Majority of these nodules are new compared with  August 30, 2024. in view of the multi lobar pneumonia and multiple pulmonary  nodules endocarditis with septic emboli should be considered versus  metastatic disease.    Upper Abdomen: No acute abnormality.    Soft Tissues/Bones: No focal destructive changes.  No acute soft tissue  abnormality.  Impression: 1. Multiple bilateral pulmonary nodules and pneumonic infiltrates. Most  pronounced infiltrate in the right lower lobe. Less pronounced infiltrate in  the left lower lobe and lingula.  2. Mediastinal lymphadenopathy.  3. In view of the multi lobar pneumonia

## 2024-09-27 NOTE — H&P
Fiberoptic bronchoscopy history:     Nursing History and Physical reviewed and agreed upon:  For additional findings, please see my notes in EPIC.  Evaluation pre-bronchoscopy patient tachypneic tachycardic looks ill.  Will change patient's status to ICU for closer monitoring.  Felt benefits outweigh risks for bronchoscopy and patient agreed.  Discussed with clinical and plan to move patient to ICU post bronchoscopy.    Patient is allergic to albuterol.    Past Medical History:   Diagnosis Date    Arthritis     Cancer (HCC)     Cataract     Chronic obstructive pulmonary disease (HCC) 9/26/2024    Coronary artery disease involving native coronary artery of native heart without angina pectoris 04/11/2024    Hiatal hernia     Hyperlipidemia     Hypertension        Past Surgical History:   Procedure Laterality Date    BREAST LUMPECTOMY  1984    BREAST LUMPECTOMY  1985    CATARACT EXTRACTION      Pt has cataract in right eye but states has not been removed    COLONOSCOPY  2022    COLONOSCOPY  2014    GALLBLADDER SURGERY  2005    HYSTERECTOMY (CERVIX STATUS UNKNOWN)  1980    PORT SURGERY Left 5/16/2024    PORT INSERTION performed by Tariq Grant MD at [facility] [place]    SMALL INTESTINE SURGERY  1971    THORACOSCOPY Right 4/15/2024    RIGHT ROBOTIC MIDDLE LOBE  LOBECTOMY WITH LYMPH NODE DISSECTION WITH CRYOABLATION NERVE BLOCK LEVELS 5-8; INTERCOSTAL NERVE BLOCK LEVELS 5-8 performed by Jose Manuel Amezcua MD at [facility] OR    UTERUS SURGERY  1978    cyst removal       Allergies   Allergen Reactions    Albuterol Other (See Comments)     Chest pain       Current Facility-Administered Medications   Medication Dose Route Frequency Provider Last Rate Last Admin    vancomycin (VANCOCIN) 1500 mg in sodium chloride 0.9 % 250 mL IVPB  1,500 mg IntraVENous Once Afsaneh Munroe PA        iopamidol (ISOVUE-370) 76 % injection 75 mL  75 mL IntraVENous ONCE PRN Afsaneh Munroe PA        oxyCODONE (ROXICODONE) immediate release tablet 5

## 2024-09-27 NOTE — CARE COORDINATION
Case Management Assessment  Initial Evaluation    Date/Time of Evaluation: 9/27/2024 10:13 AM  Assessment Completed by: Sri Hamilton    If patient is discharged prior to next notation, then this note serves as note for discharge by case management.    Patient Name: Cristy Thrasher                   YOB: 1955  Diagnosis: Shortness of breath [R06.02]  Hypokalemia [E87.6]  Hypomagnesemia [E83.42]  History of cancer [Z85.9]  Sepsis (HCC) [A41.9]  Pneumonia due to infectious organism, unspecified laterality, unspecified part of lung [J18.9]  Sepsis, due to unspecified organism, unspecified whether acute organ dysfunction present (HCC) [A41.9]  COVID [U07.1]                   Date / Time: 9/26/2024  2:19 PM    Patient Admission Status: Inpatient   Readmission Risk (Low < 19, Mod (19-27), High > 27): Readmission Risk Score: 14.4    Current PCP: Rei Velarde MD  PCP verified by CM? Yes (Syd)    Chart Reviewed: Yes      History Provided by: Patient  Patient Orientation: Alert and Oriented    Patient Cognition: Alert    Hospitalization in the last 30 days (Readmission):  No    If yes, Readmission Assessment in CM Navigator will be completed.    Advance Directives:      Code Status: Limited   Patient's Primary Decision Maker is: Patient Declined (Legal Next of Kin Remains as Decision Maker)    Primary Decision Maker: Manohar Thrasher - Spouse - [phone]    Secondary Decision Maker: Shruti Thrasher - Child - [phone]    Discharge Planning:    Patient lives with: Spouse/Significant Other Type of Home: House  Primary Care Giver: Self  Patient Support Systems include: Spouse/Significant Other, Family Members, [facility]   Current Financial resources: Medicare  Current community resources: None  Current services prior to admission: None            Current DME:              Type of Home Care services:  None    ADLS  Prior functional level: Independent in ADLs/IADLs  Current functional level:

## 2024-09-27 NOTE — FLOWSHEET NOTE
09/27/24 0631   Vital Signs   Temp (!) 101.4 °F (38.6 °C)   Temp Source Oral     Tylenol supp given.

## 2024-09-28 ENCOUNTER — APPOINTMENT (OUTPATIENT)
Age: 69
DRG: 871 | End: 2024-09-28
Attending: INTERNAL MEDICINE
Payer: MEDICARE

## 2024-09-28 LAB
ALBUMIN SERPL-MCNC: 2.7 G/DL (ref 3.4–5)
ALBUMIN/GLOB SERPL: 0.8 {RATIO} (ref 1.1–2.2)
ALP SERPL-CCNC: 186 U/L (ref 40–129)
ALT SERPL-CCNC: 13 U/L (ref 10–40)
ANION GAP SERPL CALCULATED.3IONS-SCNC: 15 MMOL/L (ref 3–16)
AST SERPL-CCNC: 15 U/L (ref 15–37)
BASOPHILS # BLD: 0 K/UL (ref 0–0.2)
BASOPHILS NFR BLD: 0.1 %
BILIRUB SERPL-MCNC: <0.2 MG/DL (ref 0–1)
BUN SERPL-MCNC: 20 MG/DL (ref 7–20)
CALCIUM SERPL-MCNC: 8.7 MG/DL (ref 8.3–10.6)
CHLORIDE SERPL-SCNC: 105 MMOL/L (ref 99–110)
CO2 SERPL-SCNC: 20 MMOL/L (ref 21–32)
CREAT SERPL-MCNC: 0.8 MG/DL (ref 0.6–1.2)
DEPRECATED RDW RBC AUTO: 15.3 % (ref 12.4–15.4)
ECHO AR MAX VEL PISA: 5 M/S
ECHO AV REGURGITANT PHT: 358 MS
ECHO BSA: 1.59 M2
ECHO EST RA PRESSURE: 15 MMHG
ECHO IVC EXP: 2.2 CM
ECHO IVC INSP: 1.2 CM
ECHO LV EF PHYSICIAN: 55 %
EOSINOPHIL # BLD: 0 K/UL (ref 0–0.6)
EOSINOPHIL NFR BLD: 0 %
GFR SERPLBLD CREATININE-BSD FMLA CKD-EPI: 80 ML/MIN/{1.73_M2}
GLUCOSE BLD-MCNC: 180 MG/DL (ref 70–99)
GLUCOSE SERPL-MCNC: 129 MG/DL (ref 70–99)
HCT VFR BLD AUTO: 28.6 % (ref 36–48)
HGB BLD-MCNC: 9.7 G/DL (ref 12–16)
LOEFFLER MB STN SPEC: NORMAL
LYMPHOCYTES # BLD: 0.6 K/UL (ref 1–5.1)
LYMPHOCYTES NFR BLD: 6.1 %
MAGNESIUM SERPL-MCNC: 1.7 MG/DL (ref 1.8–2.4)
MCH RBC QN AUTO: 32.4 PG (ref 26–34)
MCHC RBC AUTO-ENTMCNC: 34.1 G/DL (ref 31–36)
MCV RBC AUTO: 95.2 FL (ref 80–100)
MONOCYTES # BLD: 0.4 K/UL (ref 0–1.3)
MONOCYTES NFR BLD: 3.8 %
MRSA DNA SPEC QL NAA+PROBE: NORMAL
NEUTROPHILS # BLD: 8.5 K/UL (ref 1.7–7.7)
NEUTROPHILS NFR BLD: 90 %
PERFORMED ON: ABNORMAL
PLATELET # BLD AUTO: 184 K/UL (ref 135–450)
PMV BLD AUTO: 9.7 FL (ref 5–10.5)
POTASSIUM SERPL-SCNC: 3.1 MMOL/L (ref 3.5–5.1)
PROT SERPL-MCNC: 6 G/DL (ref 6.4–8.2)
RBC # BLD AUTO: 3 M/UL (ref 4–5.2)
SODIUM SERPL-SCNC: 140 MMOL/L (ref 136–145)
VANCOMYCIN SERPL-MCNC: 10.5 UG/ML
WBC # BLD AUTO: 9.4 K/UL (ref 4–11)

## 2024-09-28 PROCEDURE — 85025 COMPLETE CBC W/AUTO DIFF WBC: CPT

## 2024-09-28 PROCEDURE — 6360000002 HC RX W HCPCS: Performed by: INTERNAL MEDICINE

## 2024-09-28 PROCEDURE — 93321 DOPPLER ECHO F-UP/LMTD STD: CPT | Performed by: STUDENT IN AN ORGANIZED HEALTH CARE EDUCATION/TRAINING PROGRAM

## 2024-09-28 PROCEDURE — 80053 COMPREHEN METABOLIC PANEL: CPT

## 2024-09-28 PROCEDURE — 2700000000 HC OXYGEN THERAPY PER DAY

## 2024-09-28 PROCEDURE — 94761 N-INVAS EAR/PLS OXIMETRY MLT: CPT

## 2024-09-28 PROCEDURE — 2500000003 HC RX 250 WO HCPCS: Performed by: INTERNAL MEDICINE

## 2024-09-28 PROCEDURE — 36415 COLL VENOUS BLD VENIPUNCTURE: CPT

## 2024-09-28 PROCEDURE — 2000000000 HC ICU R&B

## 2024-09-28 PROCEDURE — 83735 ASSAY OF MAGNESIUM: CPT

## 2024-09-28 PROCEDURE — 6360000002 HC RX W HCPCS

## 2024-09-28 PROCEDURE — 80202 ASSAY OF VANCOMYCIN: CPT

## 2024-09-28 PROCEDURE — 2580000003 HC RX 258

## 2024-09-28 PROCEDURE — 6370000000 HC RX 637 (ALT 250 FOR IP): Performed by: INTERNAL MEDICINE

## 2024-09-28 PROCEDURE — 93325 DOPPLER ECHO COLOR FLOW MAPG: CPT | Performed by: STUDENT IN AN ORGANIZED HEALTH CARE EDUCATION/TRAINING PROGRAM

## 2024-09-28 PROCEDURE — 6370000000 HC RX 637 (ALT 250 FOR IP)

## 2024-09-28 PROCEDURE — 99291 CRITICAL CARE FIRST HOUR: CPT | Performed by: INTERNAL MEDICINE

## 2024-09-28 PROCEDURE — 93308 TTE F-UP OR LMTD: CPT | Performed by: STUDENT IN AN ORGANIZED HEALTH CARE EDUCATION/TRAINING PROGRAM

## 2024-09-28 PROCEDURE — 2580000003 HC RX 258: Performed by: INTERNAL MEDICINE

## 2024-09-28 PROCEDURE — 93308 TTE F-UP OR LMTD: CPT

## 2024-09-28 RX ORDER — MAGNESIUM SULFATE IN WATER 40 MG/ML
2000 INJECTION, SOLUTION INTRAVENOUS ONCE
Status: COMPLETED | OUTPATIENT
Start: 2024-09-28 | End: 2024-09-28

## 2024-09-28 RX ADMIN — CEFEPIME 2000 MG: 2 INJECTION, POWDER, FOR SOLUTION INTRAVENOUS at 04:03

## 2024-09-28 RX ADMIN — VORICONAZOLE 200 MG: 200 TABLET ORAL at 09:56

## 2024-09-28 RX ADMIN — OLANZAPINE 5 MG: 5 TABLET, FILM COATED ORAL at 20:34

## 2024-09-28 RX ADMIN — ONDANSETRON 4 MG: 2 INJECTION INTRAMUSCULAR; INTRAVENOUS at 09:55

## 2024-09-28 RX ADMIN — POTASSIUM BICARBONATE 40 MEQ: 782 TABLET, EFFERVESCENT ORAL at 05:33

## 2024-09-28 RX ADMIN — MAGNESIUM SULFATE HEPTAHYDRATE 2000 MG: 40 INJECTION, SOLUTION INTRAVENOUS at 12:13

## 2024-09-28 RX ADMIN — DEXAMETHASONE 6 MG: 4 TABLET ORAL at 09:56

## 2024-09-28 RX ADMIN — DOXYCYCLINE 100 MG: 100 INJECTION, POWDER, LYOPHILIZED, FOR SOLUTION INTRAVENOUS at 18:17

## 2024-09-28 RX ADMIN — MUPIROCIN: 20 OINTMENT TOPICAL at 20:34

## 2024-09-28 RX ADMIN — SODIUM CHLORIDE, PRESERVATIVE FREE 10 ML: 5 INJECTION INTRAVENOUS at 20:34

## 2024-09-28 RX ADMIN — MUPIROCIN: 20 OINTMENT TOPICAL at 09:56

## 2024-09-28 RX ADMIN — Medication 1000 MCG: at 09:56

## 2024-09-28 RX ADMIN — VANCOMYCIN HYDROCHLORIDE 750 MG: 750 INJECTION, POWDER, LYOPHILIZED, FOR SOLUTION INTRAVENOUS at 06:48

## 2024-09-28 RX ADMIN — CEFEPIME 2000 MG: 2 INJECTION, POWDER, FOR SOLUTION INTRAVENOUS at 15:28

## 2024-09-28 RX ADMIN — PYRIDOXINE HCL TAB 50 MG 100 MG: 50 TAB at 09:56

## 2024-09-28 RX ADMIN — AMLODIPINE BESYLATE 5 MG: 5 TABLET ORAL at 09:56

## 2024-09-28 RX ADMIN — PANTOPRAZOLE SODIUM 40 MG: 40 TABLET, DELAYED RELEASE ORAL at 05:33

## 2024-09-28 RX ADMIN — VANCOMYCIN HYDROCHLORIDE 750 MG: 750 INJECTION, POWDER, LYOPHILIZED, FOR SOLUTION INTRAVENOUS at 18:19

## 2024-09-28 RX ADMIN — Medication 500000 UNITS: at 09:56

## 2024-09-28 RX ADMIN — ENOXAPARIN SODIUM 40 MG: 100 INJECTION SUBCUTANEOUS at 09:55

## 2024-09-28 RX ADMIN — DOXYCYCLINE 100 MG: 100 INJECTION, POWDER, LYOPHILIZED, FOR SOLUTION INTRAVENOUS at 05:32

## 2024-09-28 RX ADMIN — Medication 500000 UNITS: at 16:32

## 2024-09-28 RX ADMIN — VORICONAZOLE 200 MG: 200 TABLET ORAL at 20:33

## 2024-09-28 NOTE — PLAN OF CARE
Problem: Safety - Adult  Goal: Free from fall injury  Outcome: Progressing     Problem: Pain  Goal: Verbalizes/displays adequate comfort level or baseline comfort level  Outcome: Progressing     Problem: Respiratory - Adult  Goal: Achieves optimal ventilation and oxygenation  Outcome: Progressing     Problem: Cardiovascular - Adult  Goal: Absence of cardiac dysrhythmias or at baseline  Outcome: Progressing     Problem: Nutrition Deficit:  Goal: Optimize nutritional status  Outcome: Progressing     Problem: Skin/Tissue Integrity  Goal: Absence of new skin breakdown  Description: 1.  Monitor for areas of redness and/or skin breakdown  2.  Assess vascular access sites hourly  3.  Every 4-6 hours minimum:  Change oxygen saturation probe site  4.  Every 4-6 hours:  If on nasal continuous positive airway pressure, respiratory therapy assess nares and determine need for appliance change or resting period.  Outcome: Progressing     Problem: Infection - Adult  Goal: Absence of infection at discharge  Recent Flowsheet Documentation  Taken 9/27/2024 2045 by Jayashree Brady RN  Absence of infection at discharge: Assess and monitor for signs and symptoms of infection

## 2024-09-28 NOTE — PLAN OF CARE
Problem: Respiratory - Adult  Goal: Achieves optimal ventilation and oxygenation  9/28/2024 1736 by Vinnie Dumont, RN  Outcome: Progressing  9/28/2024 1455 by Vinnie Dumont, RN  Outcome: Progressing

## 2024-09-29 LAB
ACID FAST STN SPEC QL: NORMAL
ACID FAST STN SPEC QL: NORMAL
ALBUMIN SERPL-MCNC: 2.7 G/DL (ref 3.4–5)
ALBUMIN/GLOB SERPL: 0.8 {RATIO} (ref 1.1–2.2)
ALP SERPL-CCNC: 174 U/L (ref 40–129)
ALT SERPL-CCNC: 26 U/L (ref 10–40)
ANION GAP SERPL CALCULATED.3IONS-SCNC: 11 MMOL/L (ref 3–16)
AST SERPL-CCNC: 28 U/L (ref 15–37)
BACTERIA SPEC RESP CULT: ABNORMAL
BASOPHILS # BLD: 0 K/UL (ref 0–0.2)
BASOPHILS NFR BLD: 0 %
BILIRUB SERPL-MCNC: <0.2 MG/DL (ref 0–1)
BUN SERPL-MCNC: 31 MG/DL (ref 7–20)
CALCIUM SERPL-MCNC: 8.9 MG/DL (ref 8.3–10.6)
CHLORIDE SERPL-SCNC: 107 MMOL/L (ref 99–110)
CO2 SERPL-SCNC: 24 MMOL/L (ref 21–32)
CREAT SERPL-MCNC: 0.8 MG/DL (ref 0.6–1.2)
DEPRECATED RDW RBC AUTO: 15.5 % (ref 12.4–15.4)
EOSINOPHIL # BLD: 0 K/UL (ref 0–0.6)
EOSINOPHIL NFR BLD: 0 %
GFR SERPLBLD CREATININE-BSD FMLA CKD-EPI: 80 ML/MIN/{1.73_M2}
GLUCOSE SERPL-MCNC: 138 MG/DL (ref 70–99)
GRAM STN SPEC: ABNORMAL
GRAM STN SPEC: ABNORMAL
H CAPSUL AG UR IA-ACNC: NOT DETECTED NG/ML
H CAPSUL AG UR QL IA: NOT DETECTED
HCT VFR BLD AUTO: 28.5 % (ref 36–48)
HGB BLD-MCNC: 9.8 G/DL (ref 12–16)
LYMPHOCYTES # BLD: 0.6 K/UL (ref 1–5.1)
LYMPHOCYTES NFR BLD: 6.6 %
MAGNESIUM SERPL-MCNC: 1.8 MG/DL (ref 1.8–2.4)
MCH RBC QN AUTO: 32.8 PG (ref 26–34)
MCHC RBC AUTO-ENTMCNC: 34.5 G/DL (ref 31–36)
MCV RBC AUTO: 95.1 FL (ref 80–100)
MONOCYTES # BLD: 0.5 K/UL (ref 0–1.3)
MONOCYTES NFR BLD: 5.8 %
NEUTROPHILS # BLD: 7.6 K/UL (ref 1.7–7.7)
NEUTROPHILS NFR BLD: 87.6 %
ORGANISM: ABNORMAL
ORGANISM: ABNORMAL
PLATELET # BLD AUTO: 208 K/UL (ref 135–450)
PMV BLD AUTO: 9.7 FL (ref 5–10.5)
POTASSIUM SERPL-SCNC: 3.5 MMOL/L (ref 3.5–5.1)
PROT SERPL-MCNC: 6 G/DL (ref 6.4–8.2)
RBC # BLD AUTO: 3 M/UL (ref 4–5.2)
SODIUM SERPL-SCNC: 142 MMOL/L (ref 136–145)
VANCOMYCIN TROUGH SERPL-MCNC: 13.6 UG/ML (ref 10–20)
WBC # BLD AUTO: 8.7 K/UL (ref 4–11)

## 2024-09-29 PROCEDURE — 97530 THERAPEUTIC ACTIVITIES: CPT

## 2024-09-29 PROCEDURE — 2500000003 HC RX 250 WO HCPCS: Performed by: INTERNAL MEDICINE

## 2024-09-29 PROCEDURE — 6360000002 HC RX W HCPCS

## 2024-09-29 PROCEDURE — 6370000000 HC RX 637 (ALT 250 FOR IP): Performed by: INTERNAL MEDICINE

## 2024-09-29 PROCEDURE — 2000000000 HC ICU R&B

## 2024-09-29 PROCEDURE — 97166 OT EVAL MOD COMPLEX 45 MIN: CPT

## 2024-09-29 PROCEDURE — 80053 COMPREHEN METABOLIC PANEL: CPT

## 2024-09-29 PROCEDURE — 2580000003 HC RX 258: Performed by: INTERNAL MEDICINE

## 2024-09-29 PROCEDURE — 80202 ASSAY OF VANCOMYCIN: CPT

## 2024-09-29 PROCEDURE — 94761 N-INVAS EAR/PLS OXIMETRY MLT: CPT

## 2024-09-29 PROCEDURE — 83735 ASSAY OF MAGNESIUM: CPT

## 2024-09-29 PROCEDURE — 85025 COMPLETE CBC W/AUTO DIFF WBC: CPT

## 2024-09-29 PROCEDURE — 2580000003 HC RX 258

## 2024-09-29 PROCEDURE — 97110 THERAPEUTIC EXERCISES: CPT

## 2024-09-29 PROCEDURE — 2700000000 HC OXYGEN THERAPY PER DAY

## 2024-09-29 PROCEDURE — 99233 SBSQ HOSP IP/OBS HIGH 50: CPT | Performed by: INTERNAL MEDICINE

## 2024-09-29 PROCEDURE — 97162 PT EVAL MOD COMPLEX 30 MIN: CPT

## 2024-09-29 PROCEDURE — 6360000002 HC RX W HCPCS: Performed by: INTERNAL MEDICINE

## 2024-09-29 PROCEDURE — 36415 COLL VENOUS BLD VENIPUNCTURE: CPT

## 2024-09-29 RX ORDER — CODEINE PHOSPHATE AND GUAIFENESIN 10; 100 MG/5ML; MG/5ML
5 SOLUTION ORAL EVERY 8 HOURS PRN
Status: DISCONTINUED | OUTPATIENT
Start: 2024-09-29 | End: 2024-10-02 | Stop reason: HOSPADM

## 2024-09-29 RX ORDER — ZOLPIDEM TARTRATE 5 MG/1
5 TABLET ORAL NIGHTLY PRN
Status: DISCONTINUED | OUTPATIENT
Start: 2024-09-29 | End: 2024-10-02 | Stop reason: HOSPADM

## 2024-09-29 RX ADMIN — VANCOMYCIN HYDROCHLORIDE 750 MG: 750 INJECTION, POWDER, LYOPHILIZED, FOR SOLUTION INTRAVENOUS at 18:52

## 2024-09-29 RX ADMIN — OLANZAPINE 5 MG: 5 TABLET, FILM COATED ORAL at 20:42

## 2024-09-29 RX ADMIN — DOXYCYCLINE 100 MG: 100 INJECTION, POWDER, LYOPHILIZED, FOR SOLUTION INTRAVENOUS at 05:41

## 2024-09-29 RX ADMIN — ATORVASTATIN CALCIUM 10 MG: 10 TABLET, FILM COATED ORAL at 20:42

## 2024-09-29 RX ADMIN — Medication 1000 MCG: at 09:20

## 2024-09-29 RX ADMIN — VANCOMYCIN HYDROCHLORIDE 750 MG: 750 INJECTION, POWDER, LYOPHILIZED, FOR SOLUTION INTRAVENOUS at 05:43

## 2024-09-29 RX ADMIN — MUPIROCIN: 20 OINTMENT TOPICAL at 09:21

## 2024-09-29 RX ADMIN — VORICONAZOLE 200 MG: 200 TABLET ORAL at 09:21

## 2024-09-29 RX ADMIN — PYRIDOXINE HCL TAB 50 MG 100 MG: 50 TAB at 09:21

## 2024-09-29 RX ADMIN — Medication 5 ML: at 21:40

## 2024-09-29 RX ADMIN — PANTOPRAZOLE SODIUM 40 MG: 40 TABLET, DELAYED RELEASE ORAL at 05:44

## 2024-09-29 RX ADMIN — AMLODIPINE BESYLATE 5 MG: 5 TABLET ORAL at 09:21

## 2024-09-29 RX ADMIN — MUPIROCIN: 20 OINTMENT TOPICAL at 20:44

## 2024-09-29 RX ADMIN — CEFEPIME 2000 MG: 2 INJECTION, POWDER, FOR SOLUTION INTRAVENOUS at 04:11

## 2024-09-29 RX ADMIN — DEXAMETHASONE 6 MG: 4 TABLET ORAL at 09:20

## 2024-09-29 RX ADMIN — ZOLPIDEM TARTRATE 5 MG: 5 TABLET ORAL at 20:42

## 2024-09-29 RX ADMIN — ACETAMINOPHEN, ASPIRIN (NSAID) AND CAFFEINE 1 TABLET: 250; 250; 65 TABLET, FILM COATED ORAL at 04:38

## 2024-09-29 RX ADMIN — ENOXAPARIN SODIUM 40 MG: 100 INJECTION SUBCUTANEOUS at 09:21

## 2024-09-29 ASSESSMENT — PAIN DESCRIPTION - DESCRIPTORS
DESCRIPTORS: ACHING
DESCRIPTORS: ACHING

## 2024-09-29 ASSESSMENT — PAIN SCALES - GENERAL
PAINLEVEL_OUTOF10: 5
PAINLEVEL_OUTOF10: 5

## 2024-09-29 ASSESSMENT — PAIN DESCRIPTION - LOCATION
LOCATION: HEAD
LOCATION: HEAD

## 2024-09-29 NOTE — PLAN OF CARE
Problem: Safety - Adult  Goal: Free from fall injury  9/28/2024 2056 by Jayashree Brady RN  Outcome: Progressing     Problem: Pain  Goal: Verbalizes/displays adequate comfort level or baseline comfort level  9/28/2024 2056 by Jayashree Brady RN  Outcome: Progressing     Problem: Respiratory - Adult  Goal: Achieves optimal ventilation and oxygenation  9/28/2024 2056 by Jayashree Brady RN  Outcome: Progressing     Problem: Cardiovascular - Adult  Goal: Absence of cardiac dysrhythmias or at baseline  9/28/2024 2056 by Jayashree Brady, RN  Outcome: Progressing     Problem: Nutrition Deficit:  Goal: Optimize nutritional status  9/28/2024 2056 by Jayashree Brady RN  Outcome: Progressing     Problem: Skin/Tissue Integrity  Goal: Absence of new skin breakdown  Description: 1.  Monitor for areas of redness and/or skin breakdown  2.  Assess vascular access sites hourly  3.  Every 4-6 hours minimum:  Change oxygen saturation probe site  4.  Every 4-6 hours:  If on nasal continuous positive airway pressure, respiratory therapy assess nares and determine need for appliance change or resting period.  9/28/2024 2056 by Jayashree Brady, RN  Outcome: Progressing

## 2024-09-30 LAB
ADENOVIRUS PCR: NOT DETECTED
ADENOVIRUS PCR: NOT DETECTED
ALBUMIN SERPL-MCNC: 2.6 G/DL (ref 3.4–5)
ALBUMIN/GLOB SERPL: 0.9 {RATIO} (ref 1.1–2.2)
ALP SERPL-CCNC: 133 U/L (ref 40–129)
ALT SERPL-CCNC: 23 U/L (ref 10–40)
ANION GAP SERPL CALCULATED.3IONS-SCNC: 12 MMOL/L (ref 3–16)
AST SERPL-CCNC: 16 U/L (ref 15–37)
BACTERIA BLD CULT ORG #2: NORMAL
BACTERIA BLD CULT: NORMAL
BILIRUB SERPL-MCNC: 0.3 MG/DL (ref 0–1)
BUN SERPL-MCNC: 35 MG/DL (ref 7–20)
CALCIUM SERPL-MCNC: 8.5 MG/DL (ref 8.3–10.6)
CHLORIDE SERPL-SCNC: 107 MMOL/L (ref 99–110)
CO2 SERPL-SCNC: 23 MMOL/L (ref 21–32)
CREAT SERPL-MCNC: 0.7 MG/DL (ref 0.6–1.2)
GFR SERPLBLD CREATININE-BSD FMLA CKD-EPI: >90 ML/MIN/{1.73_M2}
GLUCOSE SERPL-MCNC: 124 MG/DL (ref 70–99)
HUMAN METAPNEUMOVIRUS PCR: NOT DETECTED
HUMAN METAPNEUMOVIRUS PCR: NOT DETECTED
INFLUENZA A: NOT DETECTED
INFLUENZA A: NOT DETECTED
INFLUENZA B: NOT DETECTED
INFLUENZA B: NOT DETECTED
PARAINFLUENZA 1 PCR: NOT DETECTED
PARAINFLUENZA 1 PCR: NOT DETECTED
PARAINFLUENZA 2 PCR: NOT DETECTED
PARAINFLUENZA 2 PCR: NOT DETECTED
PARAINFLUENZA 3 PCR: NOT DETECTED
PARAINFLUENZA 3 PCR: NOT DETECTED
PARAINFLUENZA 4 PCR: NOT DETECTED
PARAINFLUENZA 4 PCR: NOT DETECTED
POTASSIUM SERPL-SCNC: 3.7 MMOL/L (ref 3.5–5.1)
PRELIMINARY: NORMAL
PRELIMINARY: NORMAL
PROT SERPL-MCNC: 5.5 G/DL (ref 6.4–8.2)
RHINO/ENTEROVIRUS PCR: DETECTED
RHINO/ENTEROVIRUS PCR: NOT DETECTED
RSV BY PCR: NOT DETECTED
RSV BY PCR: NOT DETECTED
RSV SOURCE: ABNORMAL
RSV SOURCE: NORMAL
SODIUM SERPL-SCNC: 142 MMOL/L (ref 136–145)

## 2024-09-30 PROCEDURE — 6360000002 HC RX W HCPCS

## 2024-09-30 PROCEDURE — 94761 N-INVAS EAR/PLS OXIMETRY MLT: CPT

## 2024-09-30 PROCEDURE — 80053 COMPREHEN METABOLIC PANEL: CPT

## 2024-09-30 PROCEDURE — 2580000003 HC RX 258: Performed by: INTERNAL MEDICINE

## 2024-09-30 PROCEDURE — 99233 SBSQ HOSP IP/OBS HIGH 50: CPT | Performed by: INTERNAL MEDICINE

## 2024-09-30 PROCEDURE — 36415 COLL VENOUS BLD VENIPUNCTURE: CPT

## 2024-09-30 PROCEDURE — 2700000000 HC OXYGEN THERAPY PER DAY

## 2024-09-30 PROCEDURE — 6360000002 HC RX W HCPCS: Performed by: INTERNAL MEDICINE

## 2024-09-30 PROCEDURE — 6370000000 HC RX 637 (ALT 250 FOR IP): Performed by: INTERNAL MEDICINE

## 2024-09-30 PROCEDURE — 1200000000 HC SEMI PRIVATE

## 2024-09-30 PROCEDURE — 2580000003 HC RX 258

## 2024-09-30 RX ORDER — LINEZOLID 600 MG/1
600 TABLET, FILM COATED ORAL EVERY 12 HOURS SCHEDULED
Status: DISCONTINUED | OUTPATIENT
Start: 2024-09-30 | End: 2024-10-02 | Stop reason: HOSPADM

## 2024-09-30 RX ADMIN — SODIUM CHLORIDE, PRESERVATIVE FREE 10 ML: 5 INJECTION INTRAVENOUS at 23:09

## 2024-09-30 RX ADMIN — AMLODIPINE BESYLATE 5 MG: 5 TABLET ORAL at 09:04

## 2024-09-30 RX ADMIN — DEXAMETHASONE 6 MG: 4 TABLET ORAL at 09:04

## 2024-09-30 RX ADMIN — MUPIROCIN: 20 OINTMENT TOPICAL at 09:04

## 2024-09-30 RX ADMIN — VANCOMYCIN HYDROCHLORIDE 750 MG: 750 INJECTION, POWDER, LYOPHILIZED, FOR SOLUTION INTRAVENOUS at 06:01

## 2024-09-30 RX ADMIN — PYRIDOXINE HCL TAB 50 MG 100 MG: 50 TAB at 09:04

## 2024-09-30 RX ADMIN — ATORVASTATIN CALCIUM 10 MG: 10 TABLET, FILM COATED ORAL at 23:07

## 2024-09-30 RX ADMIN — ENOXAPARIN SODIUM 40 MG: 100 INJECTION SUBCUTANEOUS at 09:04

## 2024-09-30 RX ADMIN — PANTOPRAZOLE SODIUM 40 MG: 40 TABLET, DELAYED RELEASE ORAL at 06:03

## 2024-09-30 RX ADMIN — OLANZAPINE 5 MG: 5 TABLET, FILM COATED ORAL at 23:07

## 2024-09-30 RX ADMIN — Medication 1000 MCG: at 09:04

## 2024-09-30 RX ADMIN — MUPIROCIN: 20 OINTMENT TOPICAL at 23:08

## 2024-09-30 RX ADMIN — LINEZOLID 600 MG: 600 TABLET, FILM COATED ORAL at 23:07

## 2024-09-30 NOTE — PLAN OF CARE
Problem: Discharge Planning  Goal: Discharge to home or other facility with appropriate resources  Outcome: Progressing  Flowsheets (Taken 9/29/2024 2019)  Discharge to home or other facility with appropriate resources: Identify barriers to discharge with patient and caregiver     Problem: Safety - Adult  Goal: Free from fall injury  Outcome: Progressing     Problem: Pain  Goal: Verbalizes/displays adequate comfort level or baseline comfort level  Outcome: Progressing  Flowsheets (Taken 9/29/2024 2019)  Verbalizes/displays adequate comfort level or baseline comfort level: Encourage patient to monitor pain and request assistance     Problem: Respiratory - Adult  Goal: Achieves optimal ventilation and oxygenation  Outcome: Progressing  Flowsheets (Taken 9/29/2024 2019)  Achieves optimal ventilation and oxygenation: Assess for changes in respiratory status     Problem: Cardiovascular - Adult  Goal: Absence of cardiac dysrhythmias or at baseline  Outcome: Progressing  Flowsheets (Taken 9/29/2024 2019)  Absence of cardiac dysrhythmias or at baseline: Monitor cardiac rate and rhythm     Problem: Infection - Adult  Goal: Absence of infection at discharge  Outcome: Progressing  Flowsheets (Taken 9/29/2024 2019)  Absence of infection at discharge: Assess and monitor for signs and symptoms of infection     Problem: Nutrition Deficit:  Goal: Optimize nutritional status  Outcome: Progressing     Problem: Skin/Tissue Integrity  Goal: Absence of new skin breakdown  Description: 1.  Monitor for areas of redness and/or skin breakdown  2.  Assess vascular access sites hourly  3.  Every 4-6 hours minimum:  Change oxygen saturation probe site  4.  Every 4-6 hours:  If on nasal continuous positive airway pressure, respiratory therapy assess nares and determine need for appliance change or resting period.  Outcome: Progressing

## 2024-09-30 NOTE — FLOWSHEET NOTE
09/29/24 2345   Vital Signs   Temp 97.9 °F (36.6 °C)   Temp Source Axillary   Heart Rate Source Monitor   Respirations 20   /76   MAP (Calculated) 93   BP Location Right upper arm   BP Method Automatic   Patient Position Semi fowlers   Pain Assessment   Pain Assessment None - Denies Pain   Oxygen Therapy   Pulse Oximetry Type Intermittent   Pulse Oximeter Device Mode Intermittent   Pulse Oximeter Device Location Left;Finger   O2 Device Nasal cannula   Skin Assessment Clean, dry, & intact   O2 Flow Rate (L/min) 5 L/min   Oxygen Therapy Supplemental oxygen

## 2024-09-30 NOTE — CARE COORDINATION
Reviewed chart, met with pt and  at bedside. Pt refusing SNF at this time. States she will DC home. Will likely need new O2 set up.  states he will be working and pt will be home alone. Discussed private duty HC to have someone with pt for the first few days she is at home.  states she would like to see how she gets from bed to chair. He states there home is small and his wife would not have far distances to walk. CM following.

## 2024-09-30 NOTE — FLOWSHEET NOTE
09/29/24 2019   Vital Signs   Temp 97.7 °F (36.5 °C)   Temp Source Oral   /78   MAP (Calculated) 97   BP Location Right upper arm   BP Method Automatic   Patient Position Semi norriswlers   Pain Assessment   Pain Assessment None - Denies Pain   Care Plan - Pain Goals   Verbalizes/displays adequate comfort level or baseline comfort level Encourage patient to monitor pain and request assistance   Opioid-Induced Sedation   POSS Score 1   RASS   Valente Agitation Sedation Scale (RASS) 0   Oxygen Therapy   Pulse Oximetry Type Intermittent   Pulse Oximeter Device Mode Intermittent   Pulse Oximeter Device Location Left;Finger   O2 Device Nasal cannula   Skin Assessment Clean, dry, & intact   O2 Flow Rate (L/min) 5 L/min   Oxygen Therapy Supplemental oxygen

## 2024-09-30 NOTE — FLOWSHEET NOTE
09/30/24 0423   Vital Signs   Temp 97.5 °F (36.4 °C)   Temp Source Oral   Heart Rate Source Monitor   BP Location Left upper arm   BP Method Automatic   Patient Position Semi fowlers   Pain Assessment   Pain Assessment None - Denies Pain   Oxygen Therapy   Pulse Oximetry Type Intermittent   Pulse Oximeter Device Mode Intermittent   Pulse Oximeter Device Location Left;Finger   O2 Device High flow nasal cannula   Skin Assessment Clean, dry, & intact   O2 Flow Rate (L/min) 5 L/min   Oxygen Therapy Supplemental oxygen   Height and Weight   Weight - Scale 57.2 kg (126 lb 1.7 oz)   Weight Method Bed scale   BMI (Calculated) 21.7

## 2024-10-01 LAB
ALBUMIN SERPL-MCNC: 2.9 G/DL (ref 3.4–5)
ALBUMIN/GLOB SERPL: 0.9 {RATIO} (ref 1.1–2.2)
ALP SERPL-CCNC: 135 U/L (ref 40–129)
ALT SERPL-CCNC: 51 U/L (ref 10–40)
ANION GAP SERPL CALCULATED.3IONS-SCNC: 13 MMOL/L (ref 3–16)
ASPERGILLUS AB SER QL ID: NOT DETECTED
ASPERGILLUS AB TITR SER CF: NORMAL {TITER}
AST SERPL-CCNC: 49 U/L (ref 15–37)
B DERMAT AB SER QL ID: NOT DETECTED
B DERMAT AB SER-ACNC: 0.5 IV
BILIRUB SERPL-MCNC: 0.3 MG/DL (ref 0–1)
BUN SERPL-MCNC: 37 MG/DL (ref 7–20)
CALCIUM SERPL-MCNC: 8.8 MG/DL (ref 8.3–10.6)
CHLORIDE SERPL-SCNC: 104 MMOL/L (ref 99–110)
CO2 SERPL-SCNC: 24 MMOL/L (ref 21–32)
COCCIDIOIDES AB SPEC QL ID: NOT DETECTED
COCCIDIOIDES AB TITR SER CF: NORMAL {TITER}
CREAT SERPL-MCNC: 0.7 MG/DL (ref 0.6–1.2)
GFR SERPLBLD CREATININE-BSD FMLA CKD-EPI: >90 ML/MIN/{1.73_M2}
GLUCOSE SERPL-MCNC: 102 MG/DL (ref 70–99)
H CAPSUL AB TITR SER ID: NOT DETECTED {TITER}
H CAPSUL MYC AB TITR SER CF: NORMAL {TITER}
H CAPSUL YST AB TITR SER CF: NORMAL {TITER}
MAGNESIUM SERPL-MCNC: 1.4 MG/DL (ref 1.8–2.4)
POTASSIUM SERPL-SCNC: 3.5 MMOL/L (ref 3.5–5.1)
PROT SERPL-MCNC: 6 G/DL (ref 6.4–8.2)
SODIUM SERPL-SCNC: 141 MMOL/L (ref 136–145)

## 2024-10-01 PROCEDURE — 83735 ASSAY OF MAGNESIUM: CPT

## 2024-10-01 PROCEDURE — 1200000000 HC SEMI PRIVATE

## 2024-10-01 PROCEDURE — 94761 N-INVAS EAR/PLS OXIMETRY MLT: CPT

## 2024-10-01 PROCEDURE — 6370000000 HC RX 637 (ALT 250 FOR IP): Performed by: INTERNAL MEDICINE

## 2024-10-01 PROCEDURE — 99232 SBSQ HOSP IP/OBS MODERATE 35: CPT | Performed by: INTERNAL MEDICINE

## 2024-10-01 PROCEDURE — 2580000003 HC RX 258: Performed by: INTERNAL MEDICINE

## 2024-10-01 PROCEDURE — 6360000002 HC RX W HCPCS: Performed by: INTERNAL MEDICINE

## 2024-10-01 PROCEDURE — 80053 COMPREHEN METABOLIC PANEL: CPT

## 2024-10-01 PROCEDURE — 36415 COLL VENOUS BLD VENIPUNCTURE: CPT

## 2024-10-01 PROCEDURE — 2700000000 HC OXYGEN THERAPY PER DAY

## 2024-10-01 RX ORDER — LINEZOLID 600 MG/1
600 TABLET, FILM COATED ORAL EVERY 12 HOURS SCHEDULED
Qty: 16 TABLET | Refills: 0 | Status: SHIPPED | OUTPATIENT
Start: 2024-10-01 | End: 2024-10-09

## 2024-10-01 RX ORDER — DEXAMETHASONE 6 MG/1
6 TABLET ORAL DAILY
Qty: 4 TABLET | Refills: 0 | Status: SHIPPED | OUTPATIENT
Start: 2024-10-02 | End: 2024-10-06

## 2024-10-01 RX ADMIN — LINEZOLID 600 MG: 600 TABLET, FILM COATED ORAL at 10:33

## 2024-10-01 RX ADMIN — LINEZOLID 600 MG: 600 TABLET, FILM COATED ORAL at 22:42

## 2024-10-01 RX ADMIN — DEXAMETHASONE 6 MG: 4 TABLET ORAL at 10:33

## 2024-10-01 RX ADMIN — PANTOPRAZOLE SODIUM 40 MG: 40 TABLET, DELAYED RELEASE ORAL at 06:44

## 2024-10-01 RX ADMIN — SODIUM CHLORIDE, PRESERVATIVE FREE 10 ML: 5 INJECTION INTRAVENOUS at 10:34

## 2024-10-01 RX ADMIN — PYRIDOXINE HCL TAB 50 MG 100 MG: 50 TAB at 10:33

## 2024-10-01 RX ADMIN — SODIUM CHLORIDE, PRESERVATIVE FREE 10 ML: 5 INJECTION INTRAVENOUS at 22:43

## 2024-10-01 RX ADMIN — OLANZAPINE 5 MG: 5 TABLET, FILM COATED ORAL at 22:41

## 2024-10-01 RX ADMIN — Medication 1000 MCG: at 10:33

## 2024-10-01 RX ADMIN — AMLODIPINE BESYLATE 5 MG: 5 TABLET ORAL at 10:33

## 2024-10-01 RX ADMIN — ATORVASTATIN CALCIUM 10 MG: 10 TABLET, FILM COATED ORAL at 22:41

## 2024-10-01 NOTE — FLOWSHEET NOTE
10/01/24 1931   Handoff   Communication Given Shift Handoff   Handoff Given To Juan Miguel   Handoff Received From Eric   Handoff Communication Face to Face;At bedside   Time Handoff Given 1905   End of Shift Check Performed Yes

## 2024-10-01 NOTE — PLAN OF CARE
Problem: Discharge Planning  Goal: Discharge to home or other facility with appropriate resources  10/1/2024 0218 by Isacc Rousseau RN  Outcome: Progressing  Flowsheets (Taken 9/30/2024 2000)  Discharge to home or other facility with appropriate resources: Identify barriers to discharge with patient and caregiver     Problem: Safety - Adult  Goal: Free from fall injury  10/1/2024 0218 by Isacc Rousseau RN  Outcome: Progressing     Problem: Pain  Goal: Verbalizes/displays adequate comfort level or baseline comfort level  10/1/2024 0218 by Isacc Rousseau RN  Outcome: Progressing  Flowsheets (Taken 9/30/2024 1934)  Verbalizes/displays adequate comfort level or baseline comfort level:   Encourage patient to monitor pain and request assistance   Assess pain using appropriate pain scale     Problem: Respiratory - Adult  Goal: Achieves optimal ventilation and oxygenation  10/1/2024 0218 by Isacc Rousseau RN  Outcome: Progressing  Flowsheets (Taken 9/30/2024 2000)  Achieves optimal ventilation and oxygenation: Assess for changes in respiratory status     Problem: Cardiovascular - Adult  Goal: Absence of cardiac dysrhythmias or at baseline  10/1/2024 0218 by Isacc Rousseau RN  Outcome: Progressing  Flowsheets (Taken 9/30/2024 2000)  Absence of cardiac dysrhythmias or at baseline: Monitor cardiac rate and rhythm     Problem: Infection - Adult  Goal: Absence of infection at discharge  10/1/2024 0218 by Isacc Rousseau RN  Outcome: Progressing  Flowsheets (Taken 9/30/2024 2000)  Absence of infection at discharge:   Assess and monitor for signs and symptoms of infection   Monitor lab/diagnostic results     Problem: Nutrition Deficit:  Goal: Optimize nutritional status  10/1/2024 0218 by Isacc Rousseau RN  Outcome: Progressing     Problem: Skin/Tissue Integrity  Goal: Absence of new skin breakdown  Description: 1.  Monitor for areas of redness and/or skin breakdown  2.  Assess vascular access sites hourly  3.  Every 4-6 hours

## 2024-10-01 NOTE — PLAN OF CARE
Patient provided a COPD Educational Folder that includes the following materials:     [x]  [facility] Booklet: Managing your COPD  [x]  ALA: Getting the Most Out of Medication Delivery Devices  [x]  ALA: My COPD Action Plan  [x]  Better Breathers [facility]: [facility]   [x]  Smoking Cessation Classes  [x]  Outpatient Spiritual Care Services  [x]  Magnet: Signs of COPD    PATIENT/CAREGIVER TEACHING:   Level of patient/caregiver understanding able to:   [x] Verbalize understanding   [] Demonstrate understanding       [] Teach back        [] Needs reinforcement     []  Other:     Electronically signed by Isacc Rousseau RN on 10/1/2024 at 4:08 AM

## 2024-10-01 NOTE — FLOWSHEET NOTE
10/01/24 0713   Handoff   Communication Given Shift Handoff   Handoff Given To Rosalie HOWARD   Handoff Received From Rosy HOWARD   Handoff Communication Face to Face;At bedside   Time Handoff Given 0713   End of Shift Check Performed Yes     Pt in bed with eyes closed. On O2 2lpm via nasal cannula.  No signs of distress noted.

## 2024-10-02 ENCOUNTER — TELEPHONE (OUTPATIENT)
Dept: PULMONOLOGY | Age: 69
End: 2024-10-02

## 2024-10-02 VITALS
HEART RATE: 79 BPM | SYSTOLIC BLOOD PRESSURE: 154 MMHG | BODY MASS INDEX: 23.17 KG/M2 | WEIGHT: 135.7 LBS | RESPIRATION RATE: 17 BRPM | TEMPERATURE: 97.7 F | DIASTOLIC BLOOD PRESSURE: 74 MMHG | HEIGHT: 64 IN | OXYGEN SATURATION: 94 %

## 2024-10-02 LAB
ALBUMIN SERPL-MCNC: 2.9 G/DL (ref 3.4–5)
ALBUMIN/GLOB SERPL: 1 {RATIO} (ref 1.1–2.2)
ALP SERPL-CCNC: 125 U/L (ref 40–129)
ALT SERPL-CCNC: 64 U/L (ref 10–40)
ANION GAP SERPL CALCULATED.3IONS-SCNC: 12 MMOL/L (ref 3–16)
AST SERPL-CCNC: 33 U/L (ref 15–37)
BILIRUB SERPL-MCNC: 0.3 MG/DL (ref 0–1)
BUN SERPL-MCNC: 36 MG/DL (ref 7–20)
CALCIUM SERPL-MCNC: 8.6 MG/DL (ref 8.3–10.6)
CHLORIDE SERPL-SCNC: 104 MMOL/L (ref 99–110)
CO2 SERPL-SCNC: 25 MMOL/L (ref 21–32)
CREAT SERPL-MCNC: 0.7 MG/DL (ref 0.6–1.2)
GFR SERPLBLD CREATININE-BSD FMLA CKD-EPI: >90 ML/MIN/{1.73_M2}
GLUCOSE SERPL-MCNC: 83 MG/DL (ref 70–99)
POTASSIUM SERPL-SCNC: 3.6 MMOL/L (ref 3.5–5.1)
PROT SERPL-MCNC: 5.8 G/DL (ref 6.4–8.2)
SODIUM SERPL-SCNC: 141 MMOL/L (ref 136–145)

## 2024-10-02 PROCEDURE — 6370000000 HC RX 637 (ALT 250 FOR IP): Performed by: INTERNAL MEDICINE

## 2024-10-02 PROCEDURE — 2580000003 HC RX 258: Performed by: INTERNAL MEDICINE

## 2024-10-02 PROCEDURE — 80053 COMPREHEN METABOLIC PANEL: CPT

## 2024-10-02 PROCEDURE — 6360000002 HC RX W HCPCS: Performed by: INTERNAL MEDICINE

## 2024-10-02 PROCEDURE — 2700000000 HC OXYGEN THERAPY PER DAY

## 2024-10-02 PROCEDURE — 36415 COLL VENOUS BLD VENIPUNCTURE: CPT

## 2024-10-02 PROCEDURE — 94761 N-INVAS EAR/PLS OXIMETRY MLT: CPT

## 2024-10-02 RX ADMIN — SODIUM CHLORIDE, PRESERVATIVE FREE 10 ML: 5 INJECTION INTRAVENOUS at 08:10

## 2024-10-02 RX ADMIN — DEXAMETHASONE 6 MG: 4 TABLET ORAL at 08:09

## 2024-10-02 RX ADMIN — PANTOPRAZOLE SODIUM 40 MG: 40 TABLET, DELAYED RELEASE ORAL at 06:05

## 2024-10-02 RX ADMIN — PYRIDOXINE HCL TAB 50 MG 100 MG: 50 TAB at 08:09

## 2024-10-02 RX ADMIN — LINEZOLID 600 MG: 600 TABLET, FILM COATED ORAL at 08:09

## 2024-10-02 RX ADMIN — AMLODIPINE BESYLATE 5 MG: 5 TABLET ORAL at 08:09

## 2024-10-02 RX ADMIN — Medication 1000 MCG: at 08:09

## 2024-10-02 NOTE — PROGRESS NOTES
[facility]                                 Pulmonary Consult /Progress Note :                                                                    CC: Pneumonia    Subjective:   No fever   On 3 L ,base line RA  She had bronch and was + MRSA  Better PO intake   Was not able to sleep last night  Worse redness both hand palm and some dtting   Appeared 1 week after immunotherapy   IV line: Port A cath       Intake/Output Summary (Last 24 hours) at 9/30/2024 0843  Last data filed at 9/30/2024 0601  Gross per 24 hour   Intake 1058.72 ml   Output 2600 ml   Net -1541.28 ml       Exam:   /67   Pulse 62   Temp 97.5 °F (36.4 °C) (Oral)   Resp 21   Ht 1.626 m (5' 4\")   Wt 57.2 kg (126 lb 1.7 oz)   SpO2 95%   BMI 21.65 kg/m²  on 5 LPM   Gen: No distress.  Ill-appearing  Eyes: PERRL. No sclera icterus. No conjunctival injection.   ENT: No discharge. Pharynx clear.   Neck: Trachea midline. No obvious mass.    Resp: Mild accessory muscle use.  Scattered crackles. No wheezes. Few rhonchi. No dullness on percussion.  CV: Regular rate. Regular rhythm. No murmur or rub. No edema.   GI: Non-tender. Non-distended. No hernia.   Skin: Warm and dry. No nodule on exposed extremities.   Lymph: No cervical LAD. No supraclavicular LAD.   M/S: No cyanosis. No joint deformity. No clubbing.   Neuro: Awake. Alert. Moves all four extremities.   Psych: Oriented x 3. No anxiety.     Scheduled Meds:   vancomycin  750 mg IntraVENous Q12H    mupirocin   Each Nostril BID    dexAMETHasone  6 mg Oral Daily    pantoprazole  40 mg Oral QAM AC    amLODIPine  5 mg Oral Daily    atorvastatin  10 mg Oral Nightly    OLANZapine  5 mg Oral Nightly    vitamin B-12  1,000 mcg Oral Daily    vitamin B-6  100 mg Oral Daily    sodium chloride flush  5-40 mL IntraVENous 2 times per day    enoxaparin  40 mg SubCUTAneous Daily    nystatin  5 mL Oral 4x Daily     Continuous 
                                                  [facility]                                 Pulmonary Consult /Progress Note :                                                                    CC: Pneumonia    Subjective:   No fever   Remain weak  On 2 L   She had bronch and was + MRSA  Better PO intake   Still with redness both hand palm and some dtting   Appeared 1 week after immunotherapy   IV line: Port A cath       Intake/Output Summary (Last 24 hours) at 10/1/2024 0910  Last data filed at 10/1/2024 0700  Gross per 24 hour   Intake 328.26 ml   Output 1500 ml   Net -1171.74 ml       Exam:   BP (!) 153/71   Pulse 76   Temp 98.4 °F (36.9 °C) (Oral)   Resp 16   Ht 1.626 m (5' 4\")   Wt 61.6 kg (135 lb 11.2 oz)   SpO2 95%   BMI 23.29 kg/m²  on 5 LPM   Gen: No distress.  Ill-appearing  Eyes: PERRL. No sclera icterus. No conjunctival injection.   ENT: No discharge. Pharynx clear.   Neck: Trachea midline. No obvious mass.    Resp: Mild accessory muscle use.  Scattered crackles. No wheezes. Few rhonchi. No dullness on percussion.  CV: Regular rate. Regular rhythm. No murmur or rub. No edema.   GI: Non-tender. Non-distended. No hernia.   Skin: Warm and dry. No nodule on exposed extremities.   Lymph: No cervical LAD. No supraclavicular LAD.   M/S: No cyanosis. No joint deformity. No clubbing.   Neuro: Awake. Alert. Moves all four extremities.   Psych: Oriented x 3. No anxiety.     Scheduled Meds:   linezolid  600 mg Oral 2 times per day    mupirocin   Each Nostril BID    dexAMETHasone  6 mg Oral Daily    pantoprazole  40 mg Oral QAM AC    amLODIPine  5 mg Oral Daily    atorvastatin  10 mg Oral Nightly    OLANZapine  5 mg Oral Nightly    vitamin B-12  1,000 mcg Oral Daily    vitamin B-6  100 mg Oral Daily    sodium chloride flush  5-40 mL IntraVENous 2 times per day    enoxaparin  40 mg SubCUTAneous Daily    nystatin  5 mL Oral 4x Daily     Continuous Infusions:   sodium 
                                                  [facility]                                 Pulmonary Consult /Progress Note :                                                                    CC: Pneumonia    Subjective:   No fever   Remain weak  On 2 L   She had bronch and was + MRSA  Better PO intake   Still with redness both hand palm and some dtting   Appeared 1 week after immunotherapy   IV line: Port A cath       Intake/Output Summary (Last 24 hours) at 10/2/2024 0901  Last data filed at 10/2/2024 0418  Gross per 24 hour   Intake --   Output 1100 ml   Net -1100 ml       Exam:   BP (!) 154/74   Pulse 79   Temp 97.7 °F (36.5 °C) (Oral)   Resp 17   Ht 1.626 m (5' 4\")   Wt 61.6 kg (135 lb 11.2 oz)   SpO2 94%   BMI 23.29 kg/m²  on 5 LPM   Gen: No distress.  Ill-appearing  Eyes: PERRL. No sclera icterus. No conjunctival injection.   ENT: No discharge. Pharynx clear.   Neck: Trachea midline. No obvious mass.    Resp: Mild accessory muscle use.  Scattered crackles. No wheezes. Few rhonchi. No dullness on percussion.  CV: Regular rate. Regular rhythm. No murmur or rub. No edema.   GI: Non-tender. Non-distended. No hernia.   Skin: Warm and dry. No nodule on exposed extremities.   Lymph: No cervical LAD. No supraclavicular LAD.   M/S: No cyanosis. No joint deformity. No clubbing.   Neuro: Awake. Alert. Moves all four extremities.   Psych: Oriented x 3. No anxiety.     Scheduled Meds:   linezolid  600 mg Oral 2 times per day    pantoprazole  40 mg Oral QAM AC    amLODIPine  5 mg Oral Daily    atorvastatin  10 mg Oral Nightly    OLANZapine  5 mg Oral Nightly    vitamin B-12  1,000 mcg Oral Daily    vitamin B-6  100 mg Oral Daily    sodium chloride flush  5-40 mL IntraVENous 2 times per day    enoxaparin  40 mg SubCUTAneous Daily    nystatin  5 mL Oral 4x Daily     Continuous Infusions:   sodium chloride       PRN Meds:  zolpidem, guaiFENesin-codeine, 
      Hospitalist Progress Note      PCP: Rei Velarde MD    Date of Admission: 9/26/2024    Subjective: afebrile overnight, states she is feeling better today, cough improved    Medications:  Reviewed    Infusion Medications    sodium chloride       Scheduled Medications    vancomycin  750 mg IntraVENous Q12H    mupirocin   Each Nostril BID    dexAMETHasone  6 mg Oral Daily    pantoprazole  40 mg Oral QAM AC    amLODIPine  5 mg Oral Daily    atorvastatin  10 mg Oral Nightly    OLANZapine  5 mg Oral Nightly    vitamin B-12  1,000 mcg Oral Daily    vitamin B-6  100 mg Oral Daily    sodium chloride flush  5-40 mL IntraVENous 2 times per day    enoxaparin  40 mg SubCUTAneous Daily    nystatin  5 mL Oral 4x Daily     PRN Meds: zolpidem, guaiFENesin-codeine, aspirin-acetaminophen-caffeine, iopamidol, oxyCODONE, sodium chloride flush, sodium chloride, potassium chloride **OR** potassium alternative oral replacement **OR** potassium chloride, ondansetron **OR** ondansetron, polyethylene glycol, acetaminophen **OR** acetaminophen, perflutren lipid microspheres      Intake/Output Summary (Last 24 hours) at 9/30/2024 0016  Last data filed at 9/29/2024 2345  Gross per 24 hour   Intake 819.47 ml   Output 2700 ml   Net -1880.53 ml       Physical Exam Performed:    /76   Pulse 88   Temp 97.9 °F (36.6 °C) (Axillary)   Resp 20   Ht 1.626 m (5' 4\")   Wt 57.8 kg (127 lb 6.8 oz)   SpO2 99%   BMI 21.87 kg/m²       Gen: mild  distress. Alert.   Resp: + accessory muscle use. No crackles. No wheezes. +rhonchi in lung fields bilaterally +tachypneic   CV: +Tachycardic rate. Regular rhythm. No murmur.  No rub. No edema. +left chest wall port   Peripheral Pulses: +2 palpable, equal bilaterally   GI: Non-tender. Non-distended. No masses. No organomegaly. Normal bowel sounds. No hernia.   Skin: Warm and dry. No nodule on exposed extremities. No rash on exposed extremities.   M/S: No cyanosis. No joint deformity. No clubbing. 
   09/27/24 1138   Encounter Summary   Encounter Overview/Reason Spiritual/Emotional Needs;Attempted Encounter  (Patient was sleeping)   Service Provided For Patient;Family   Referral/Consult From Nurse   Support System Spouse;Children   Last Encounter  09/27/24  ( visited; provided pastoral support and prayer;  is a local )   Assessment/Intervention/Outcome   Assessment Unable to assess   Intervention Sustaining Presence/Ministry of presence;Prayer (assurance of)/[place]       
  Hospitalist Progress Note      PCP: Rei Velarde MD    Date of Admission: 9/26/2024     COVID   Hypoxia     Subjective:   afebrile overnight, states she is feeling better today, cough improved.   Sats improved; o2 down from 5L to 2 L today    Medications:  Reviewed    Infusion Medications    sodium chloride       Scheduled Medications    linezolid  600 mg Oral 2 times per day    mupirocin   Each Nostril BID    dexAMETHasone  6 mg Oral Daily    pantoprazole  40 mg Oral QAM AC    amLODIPine  5 mg Oral Daily    atorvastatin  10 mg Oral Nightly    OLANZapine  5 mg Oral Nightly    vitamin B-12  1,000 mcg Oral Daily    vitamin B-6  100 mg Oral Daily    sodium chloride flush  5-40 mL IntraVENous 2 times per day    enoxaparin  40 mg SubCUTAneous Daily    nystatin  5 mL Oral 4x Daily     PRN Meds: zolpidem, guaiFENesin-codeine, aspirin-acetaminophen-caffeine, iopamidol, oxyCODONE, sodium chloride flush, sodium chloride, potassium chloride **OR** potassium alternative oral replacement **OR** potassium chloride, ondansetron **OR** ondansetron, polyethylene glycol, acetaminophen **OR** acetaminophen, perflutren lipid microspheres      Intake/Output Summary (Last 24 hours) at 9/30/2024 1551  Last data filed at 9/30/2024 0601  Gross per 24 hour   Intake 1058.72 ml   Output 2600 ml   Net -1541.28 ml       Physical Exam Performed:    /63   Pulse 80   Temp 97.3 °F (36.3 °C) (Temporal)   Resp (!) 33   Ht 1.626 m (5' 4\")   Wt 57.2 kg (126 lb 1.7 oz)   SpO2 94%   BMI 21.65 kg/m²       Gen: no distress. Alert.  Awake, oriented   Resp: no accessory muscle use. Diminished breath sounds, mild rhonchi  CV: regular rate and Regular rhythm. No murmur.  No rub. No edema. +left chest wall port   Peripheral Pulses: +2 palpable, equal bilaterally   GI: Non-tender. Non-distended. No masses. No organomegaly. Normal bowel sounds. No hernia.   Skin: Warm and dry. No nodule on exposed extremities. No rash on exposed extremities. 
  Pharmacy Vancomycin Consult     Vancomycin Day: 2/7  Current Dosing: pulse-dosing  Current indication: HCAP/Sepsis    Temp max:  101.4    Recent Labs     09/26/24  1441 09/27/24  0428   BUN 22* 16   CREATININE 1.5* 1.0   WBC 18.8* 14.6*       Intake/Output Summary (Last 24 hours) at 9/27/2024 0701  Last data filed at 9/27/2024 0646  Gross per 24 hour   Intake --   Output 750 ml   Net -750 ml     Culture Date      Source                       Results      Ht Readings from Last 1 Encounters:   09/26/24 1.626 m (5' 4\")        Wt Readings from Last 1 Encounters:   09/26/24 (S) 56.4 kg (124 lb 6.4 oz)       Body mass index is 21.35 kg/m².    Estimated Creatinine Clearance: 46 mL/min (based on SCr of 1 mg/dL).    Trough: 5.6    Assessment/Plan:  Give 1500 mg vancomycin x1 today.  Continue to pulse dose, as renal function still not at baseline (SeCr = 0.7).   
  Pharmacy Vancomycin Consult     Vancomycin Day: 3/7  Current Dosing: pulse due to unstable renal function  Current indication: PNA     Temp max:  99.3    Recent Labs     09/27/24  0428 09/28/24  0415   BUN 16 20   CREATININE 1.0 0.8   WBC 14.6* 9.4       Intake/Output Summary (Last 24 hours) at 9/28/2024 0509  Last data filed at 9/28/2024 0412  Gross per 24 hour   Intake 3474.53 ml   Output 1550 ml   Net 1924.53 ml     Culture Date      Source                       Results      Ht Readings from Last 1 Encounters:   09/27/24 1.626 m (5' 4.02\")        Wt Readings from Last 1 Encounters:   09/28/24 54.9 kg (121 lb 0.5 oz)       Body mass index is 20.76 kg/m².    Estimated Creatinine Clearance: 58 mL/min (based on SCr of 0.8 mg/dL).    Random: 10.5    Assessment/Plan:  SeCr almost at baseline.  Will schedule vancomycin at 750 mg q12h with dose to be given @ 0600 today.  Projected AUC = 537 with a trough at steady state of 15.8 mcg/ml.   A new vancomycin trough has been ordered for tomorrow @ 1700   
1555 consult placed to Dr. Cool for pulmonolgy  Consult has been called to Dr. Cool on 9/26/24. Spoke with Yarelis. 6:34 PM    Lorri Gonsales  9/26/2024  
4 Eyes Skin Assessment     NAME:  Cristy Thrasher  YOB: 1955  MEDICAL RECORD NUMBER:  [id number]    The patient is being assessed for  Shift Handoff    I agree that at least one RN has performed a thorough Head to Toe Skin Assessment on the patient. ALL assessment sites listed below have been assessed.        Areas assessed by both nurses:    Head, Face, Ears, Shoulders, Back, Chest, Arms, Elbows, Hands, Sacrum. Buttock, Coccyx, Ischium, Legs. Feet and Heels, and Under Medical Devices         Does the Patient have a Wound? No noted wound(s)       Kamlesh Prevention initiated by RN: Yes  Wound Care Orders initiated by RN: No    Pressure Injury (Stage 3,4, Unstageable, DTI, NWPT, and Complex wounds) if present, place Wound referral order by RN under : No    New Ostomies, if present place, Ostomy referral order under : No     Nurse 1 eSignature: Electronically signed by Vinnie Dumont RN on 9/30/24 at 6:18 PM EDT    **SHARE this note so that the co-signing nurse can place an eSignature**    Nurse 2 eSignature: {Esignature:[id number]}    
4 Eyes Skin Assessment     NAME:  Cristy Thrasher  YOB: 1955  MEDICAL RECORD NUMBER:  [id number]    The patient is being assessed for  Shift Handoff    I agree that at least one RN has performed a thorough Head to Toe Skin Assessment on the patient. ALL assessment sites listed below have been assessed.      Areas assessed by both nurses:    Head, Face, Ears, Shoulders, Back, Chest, Arms, Elbows, Hands, Sacrum. Buttock, Coccyx, Ischium, and Legs. Feet and Heels    Scattered ecchymosis, redness on both hands.      Does the Patient have a Wound? No noted wound(s)       Kamlesh Prevention initiated by RN: No  Wound Care Orders initiated by RN: No    Pressure Injury (Stage 3,4, Unstageable, DTI, NWPT, and Complex wounds) if present, place Wound referral order by RN under : No    New Ostomies, if present place, Ostomy referral order under : No     Nurse 1 eSignature: Electronically signed by Isacc Rousseau RN on 10/1/24 at 3:10 AM EDT    **SHARE this note so that the co-signing nurse can place an eSignature**    Nurse 2 eSignature: Electronically signed by Lizzy Ignacio RN on 10/1/24 at 7:43 AM EDT   
4 Eyes Skin Assessment     NAME:  Cristy Thrasher  YOB: 1955  MEDICAL RECORD NUMBER:  [id number]    The patient is being assessed for  Other Low Kamlesh scale    I agree that at least one RN has performed a thorough Head to Toe Skin Assessment on the patient. ALL assessment sites listed below have been assessed.      Areas assessed by both nurses:    Head, Face, Ears, Shoulders, Back, Chest, Arms, Elbows, Hands, Sacrum. Buttock, Coccyx, Ischium, Legs. Feet and Heels, and Under Medical Devices    Scattered bruising and blanchable redness to coccyx.      Does the Patient have a Wound? No noted wound(s)       Kamlesh Prevention initiated by RN: Yes  Wound Care Orders initiated by RN: No    Pressure Injury (Stage 3,4, Unstageable, DTI, NWPT, and Complex wounds) if present, place Wound referral order by RN under : No    New Ostomies, if present place, Ostomy referral order under : No     Nurse 1 eSignature: Electronically signed by Jayashree Brady RN on 9/27/24 at 10:31 PM EDT    **SHARE this note so that the co-signing nurse can place an eSignature**    Nurse 2 eSignature: Electronically signed by Hellen Vargas RN on 9/28/24 at 6:26 AM EDT    
4 Eyes Skin Assessment     NAME:  Cristy Thrasher  YOB: 1955  MEDICAL RECORD NUMBER:  [id number]    The patient is being assessed for  Transfer to New Unit    I agree that at least one RN has performed a thorough Head to Toe Skin Assessment on the patient. ALL assessment sites listed below have been assessed.      Areas assessed by both nurses:    Head, Face, Ears, Shoulders, Back, Chest, Arms, Elbows, Hands, Sacrum. Buttock, Coccyx, Ischium, Legs. Feet and Heels, and Under Medical Devices         Does the Patient have a Wound? Yes wound(s) were present on assessment. LDA wound assessment was Initiated and completed by RN       Kamlesh Prevention initiated by RN: Yes  Wound Care Orders initiated by RN: No    Pressure Injury (Stage 3,4, Unstageable, DTI, NWPT, and Complex wounds) if present, place Wound referral order by RN under : No    New Ostomies, if present place, Ostomy referral order under : No     Nurse 1 eSignature: Electronically signed by Carolina Coburn RN on 9/27/24 at 9:43 AM EDT    **SHARE this note so that the co-signing nurse can place an eSignature**    Nurse 2 eSignature: Electronically signed by Jolene Mcwilliams RN on 9/27/24 at 6:00 PM EDT        Blanchable redness to coccyx- mepilex placed.  
4 Eyes Skin Assessment     NAME:  Cristy Thrasher  YOB: 1955  MEDICAL RECORD NUMBER:  [id number]    The patient is being assessed for  Shift Handoff    I agree that at least one RN has performed a thorough Head to Toe Skin Assessment on the patient. ALL assessment sites listed below have been assessed.      Areas assessed by both nurses:    Head, Face, Ears, Shoulders, Back, Chest, Arms, Elbows, Hands, Sacrum. Buttock, Coccyx, Ischium, Legs. Feet and Heels, and Under Medical Devices         Does the Patient have a Wound? No noted wound(s)       Kamlesh Prevention initiated by RN: Yes  Wound Care Orders initiated by RN: No    Pressure Injury (Stage 3,4, Unstageable, DTI, NWPT, and Complex wounds) if present, place Wound referral order by RN under : No    New Ostomies, if present place, Ostomy referral order under : No     Nurse 1 eSignature: Electronically signed by Vinnie Dumont RN on 9/28/24 at 5:30 PM EDT    **SHARE this note so that the co-signing nurse can place an eSignature**    Nurse 2 eSignature: Electronically signed by Bethany Hawkins RN on 9/28/24 at 5:33 PM EDT    
AM Care rounds completed, discussed POC   
Admit: 2024    Name:  Cristy Thrasher  Room:  /0301-01  MRN:    [id number]    Daily Progress Note for 2024   Sepsis, COVID   pna  Interval History:   Continues to run high-grade temps  Not hypoxic  Cough- productive   Feels very fatigued   Scheduled Meds:   vancomycin  1,500 mg IntraVENous Once    sodium chloride flush  5-40 mL IntraVENous 2 times per day    enoxaparin  40 mg SubCUTAneous Daily    nystatin  5 mL Oral 4x Daily    cefepime  2,000 mg IntraVENous Q12H    doxycycline (VIBRAMYCIN) IV  100 mg IntraVENous Q12H    vancomycin (VANCOCIN) intermittent dosing (placeholder)   Other RX Placeholder       Continuous Infusions:   sodium chloride      sodium chloride 100 mL/hr at 24 1918       PRN Meds:  iopamidol, oxyCODONE, sodium chloride flush, sodium chloride, potassium chloride **OR** potassium alternative oral replacement **OR** potassium chloride, magnesium sulfate, ondansetron **OR** ondansetron, polyethylene glycol, acetaminophen **OR** acetaminophen, perflutren lipid microspheres                  Objective:     Temp  Av.9 °F (37.7 °C)  Min: 98.1 °F (36.7 °C)  Max: 103.1 °F (39.5 °C)  Pulse  Av.5  Min: 86  Max: 135  BP  Min: 95/55  Max: 163/84  SpO2  Av.2 %  Min: 90 %  Max: 98 %  Patient Vitals for the past 4 hrs:   Temp Temp src   24 0631 (!) 101.4 °F (38.6 °C) Oral   24 0511 100.1 °F (37.8 °C) Oral         Intake/Output Summary (Last 24 hours) at 2024 0746  Last data filed at 2024 0646  Gross per 24 hour   Intake --   Output 750 ml   Net -750 ml       Physical Exam:    Gen: mild  distress. Alert.   Eyes: PERRL. No sclera icterus. No conjunctival injection.   ENT: No discharge. Pharynx clear. +white patchy rash on the tongue (thrush)  Neck: No JVD.  No Carotid Bruit. Trachea midline.  Resp: + accessory muscle use. No crackles. No wheezes. +rhonchi in lung fields bilaterally +tachypneic   CV: +Tachycardic rate. Regular rhythm. No murmur.  No rub. No 
Comprehensive Nutrition Assessment    Type and Reason for Visit:  Initial, Positive Nutrition Screen (+ screen for MST = 4)    Nutrition Recommendations/Plan:   Start diet order - ADULT DIET; Regular diet order.   Start ONS - Ensure Plus with meals.   Monitor appetite, meal intake, and acceptance/intake of ONS.   Monitor nutrition-related labs, bowel function, and weight trends.      Malnutrition Assessment:  Malnutrition Status:  Severe malnutrition (09/27/24 1113)    Context:  Chronic Illness     Findings of the 6 clinical characteristics of malnutrition:  Energy Intake:  75% or less estimated energy requirements for 1 month or longer  Weight Loss:  Greater than 10% over 6 months (-26# or 17.3% weight loss since 4/17/24)     Body Fat Loss:  Unable to assess     Muscle Mass Loss:  Unable to assess    Fluid Accumulation:  No significant fluid accumulation     Strength:  Not Performed    Nutrition Assessment:    patient is severely malnourished r/t increased demand for nutrients/energy, catabolic illness, inadequate protein-energy intake, and impaired respiratory function AEB lung cancer + treatment for lung cancer, patient presented with SOB this admission, and poor po intake PTA; patient is at risk for further compromise d/t ongoing poor appetite/po intake, thrush present, and altered nutrition-related labs; will continue ADULT DIET; Regular diet order + Ensure Plus with meals    Nutrition Related Findings:    patient is A & O x 4; she presented with c/o SOB; hx of lung cancer and she is currently receiving treatment for lung cancer; patient had a bronch completed this am and then she was transferred to ICU after bronch; patient had a fever prior to going for bronch procedure but fever went down after patient was given a tylenol suppository; patient is from home where she lives with her ; she is a  and was IPTA; diet was advanced this am Wound Type: None       Current Nutrition Intake & Therapies:  
Family at bedside. Update given   
Heme/Onc Note:    Attempted to see but pt out of room for bronchoscopy.     No additional recommendations at this time outside of current care from oncology standpoint.     MARIBEL Tucker after d/c.    Jacob Gracia MD    
Inpatient Physical Therapy Treatment Attempt    Pt chart reviewed.  RN contacted and okayed for PT treatment.  Upon arrival, daughter at bedside with pt sleeping in bed.  With repeated verbal and tactile stimuli (including sternal rub), pt would not rouse (flickered eyes open briefly a couple times and returned right back to sleep). Will re-attempt PT treatment as pt status and therapist schedule allows.    No charge.     Emily Red, PT, DPT   #[id number]    
Interdisciplinary rounds completed, dicussed poc  
K+ level was 3.1 with AM labs. PRN 40meq PO replacement given   
Mg 1.5 - replaced 2gm on IV Magnesium.  K 3.2 - replaced Klorcon 40meq.  
No change in assessment. Pt resting comfortably Call light within reach. Monitoring closely  
O2 Sat at rest on room air is 94 %.  O2 Sat with activity on room air is 88 %.  O2 Sat at rest with oxygen @  2 lpm is 95%.  O2 Sat with activity with oxygen @ 92 lpm is 93%.   
O2 Sat at rest on room air is 94 %.  O2 Sat with activity on room air is 92 %.  No SOB noted. Patient did not have on any oxygen during trial  
Occupational and Physical Therapy  Orders received, chart reviewed. Will need new orders when medically appropriate following patient transfer from PCU to ICU.    Saul Barrett, OTR/L #[id number]  Ignacio Elias, PT, DPT     
Patient admitted to room 301 from ED. Patient oriented to room, call light, bed rails, phone, lights and bathroom. Patient instructed about the schedule of the day including: vital sign frequency, lab draws, possible tests, frequency of MD and staff rounds, daily weights, I &O's and prescribed diet.  Telemetry box in place, patient aware of placement and reason. Bed locked, in lowest position, side rails up 2/4, call light within reach.        Recliner Assessment  Patient is able to demonstrate the ability to move from a reclining position to an upright position within the recliner.       4 Eyes Skin Assessment     NAME:  Cristy Thrasher  YOB: 1955  MEDICAL RECORD NUMBER:  [id number]    The patient is being assessed for  Admission    I agree that at least one RN has performed a thorough Head to Toe Skin Assessment on the patient. ALL assessment sites listed below have been assessed.      Areas assessed by both nurses:    Head, Face, Ears, Shoulders, Back, Chest, Arms, Elbows, Hands, Sacrum. Buttock, Coccyx, Ischium, Legs. Feet and Heels, and Under Medical Devices         Does the Patient have a Wound? No noted wound(s)       Kamlesh Prevention initiated by RN: No  Wound Care Orders initiated by RN: No    Pressure Injury (Stage 3,4, Unstageable, DTI, NWPT, and Complex wounds) if present, place Wound referral order by RN under : No    New Ostomies, if present place, Ostomy referral order under : No     Nurse 1 eSignature: Electronically signed by Sophia Staley RN on 9/26/24 at 10:35 PM EDT    **SHARE this note so that the co-signing nurse can place an eSignature**    Nurse 2 eSignature: Electronically signed by Jaki Chan RN on 9/27/24 at 7:19 AM EDT   
Patient discharging to home accompanied by  and daughter, given home and portable oxygen, no pain and or discomfort noted and or reported. Transport to assist patient to family car.   
Patient with no complaints voiced at this time.  Patient up in chair per request with walker with standby assist only, no distress.  Call light in reach.  
Pre-Operative:  1.  Patient/Caregiver identifies - states name and date of birth.  2.  The patient is free from signs and symptoms of injury.  3.  The patient receives appropriate medication(s), safely administered during the Perioperative period.  4.  The patients's fluid, electrolyte, and acid-base balances are established preoperatively.  5.  The patient's pulmonary function is established preoperatively.  6.  The patient's cardiovascular status is established preoperatively.  7.  The patient / caregiver demonstrates knowledge of nutritional management related to the operative or other invasive procedure.  8.  The patient/caregiver demonstrates knowledge of medication management.  9.  The patient/caregiver demonstrates knowledge of pain management.  10.  The patient/caregiver participates in decisions affection his or her Perioperative plan of care.  11.  The patient's care is consistent with the individualized Perioperative plan of care.  12.  The patient's right to privacy is maintained.  13.  The patient is the recipient of competent and ethical care within legal standards of practice.  14.  The patient's value system, lifestyle, ethnicity, and culture are considered, respected, and incorporated in the Perioperative plan of care and understands special services available.  15.  The patient demonstrates and/or reports adequate pain control throughout the the Perioperative period.  16.  The patient's neurological status is established preoperatively.  17.  The patient/caregiver demonstrates knowledge of the expected responses to the endoscopy procedure.  18.  Patient/Caregiver has reduced anxiety.  Interventions- Familiarize with environment and equipment.  19. Patient/Caregiver verbalizes understanding of Phase II and/or Phase I process.  20.  Patient pain level is established preoperatively using age appropriate pain scale.  21.  The patient will move to fall risk upon sedation- during and through the recovery 
Pt a/o. Am assessment completed see flow sheet. Pt denies any pain/ needs at this time. Family at bedside. Call light within reach.   Vitals:    10/01/24 0853   BP: (!) 153/71   Pulse: 76   Resp: 16   Temp: 98.4 °F (36.9 °C)   SpO2: 95%      
Pt back to bed  
Pt has red areas on palm of both hands, Family stated this is new.  Monitoring to see if there is worsening   
Pt in room from bronch and report obtained from RN and RN from PCU.   Pt  and daughter in room.   Pt purewick in place.  Note redness to buttock.     
Pt off unit for bronchoscopy    Shift assessment completed prior to pt departing, see flow sheet.     Pt is A/O x4.  Pt denies any SOB or pain just feels tired.  Pt remains with fever 101.5    SpO2 93%. Respirations are easy, even, and unlabored.   Bilateral lung sounds diminished.     VSS  ST on the monitor      PORTPIV, WNL with  ml/hr    All lines and monitoring devices in place. Bed in lowest position with wheels locked. No needs expressed at this time. Will continue to monitor.    
Pt resting quietly in bed.  Skin warm and dry.  States feeling better.  Still SOB.  ICU bed 10 ready.  Patient with no complaints at this time.  
Pt transferred to ICU in stable condition on transport cardiac monitor.  Report given to Carolina HOWARD.    
Pt up in chair, ast x1  
Pt up to recliner    Patient is able to demonstrate the ability to move from a reclining position to an upright position within the recliner.    
Pulmonary Progress Note    CC: Pneumonia    Subjective:   Fever is better   Nausea     IV line: Port A cath       Intake/Output Summary (Last 24 hours) at 9/28/2024 0848  Last data filed at 9/28/2024 0552  Gross per 24 hour   Intake 3474.53 ml   Output 1250 ml   Net 2224.53 ml       Exam:   /69   Pulse 88   Temp 98.7 °F (37.1 °C) (Oral)   Resp 23   Ht 1.626 m (5' 4.02\")   Wt 54.9 kg (121 lb 0.5 oz)   SpO2 98%   BMI 20.76 kg/m²  on 6 LPM   Gen: No distress.  Ill-appearing  Eyes: PERRL. No sclera icterus. No conjunctival injection.   ENT: No discharge. Pharynx clear.   Neck: Trachea midline. No obvious mass.    Resp: Mild accessory muscle use.  Scattered crackles. No wheezes. Few rhonchi. No dullness on percussion.  CV: Regular rate. Regular rhythm. No murmur or rub. No edema.   GI: Non-tender. Non-distended. No hernia.   Skin: Warm and dry. No nodule on exposed extremities.   Lymph: No cervical LAD. No supraclavicular LAD.   M/S: No cyanosis. No joint deformity. No clubbing.   Neuro: Awake. Alert. Moves all four extremities.   Psych: Oriented x 3. No anxiety.     Scheduled Meds:   vancomycin  750 mg IntraVENous Q12H    mupirocin   Each Nostril BID    dexAMETHasone  6 mg Oral Daily    pantoprazole  40 mg Oral QAM AC    amLODIPine  5 mg Oral Daily    [Held by provider] atorvastatin  10 mg Oral Nightly    OLANZapine  5 mg Oral Nightly    vitamin B-12  1,000 mcg Oral Daily    vitamin B-6  100 mg Oral Daily    voriconazole  200 mg Oral 2 times per day    sodium chloride flush  5-40 mL IntraVENous 2 times per day    enoxaparin  40 mg SubCUTAneous Daily    nystatin  5 mL Oral 4x Daily    cefepime  2,000 mg IntraVENous Q12H    doxycycline (VIBRAMYCIN) IV  100 mg IntraVENous Q12H     Continuous Infusions:   sodium chloride      sodium chloride 100 mL/hr at 09/28/24 0412     PRN Meds:  aspirin-acetaminophen-caffeine, iopamidol, oxyCODONE, sodium chloride flush, sodium chloride, potassium chloride **OR** potassium 
Pulmonary Progress Note    CC: Pneumonia    Subjective:   Tachypnea and tachycardia this am moved to ICU for closer monitoring  Underwent bronchoscopy with BAL, tolerated well  Fever overnight     IV line: Port A cath       Intake/Output Summary (Last 24 hours) at 9/27/2024 0740  Last data filed at 9/27/2024 0646  Gross per 24 hour   Intake --   Output 750 ml   Net -750 ml       Exam:   /67   Pulse (!) 115   Temp (!) 101.4 °F (38.6 °C) (Oral)   Resp 21   Ht 1.626 m (5' 4\")   Wt (S) 56.4 kg (124 lb 6.4 oz) Comment: 18lb weight loss in one month  SpO2 95%   BMI 21.35 kg/m²  on 6LPM   Gen: No distress.  Ill-appearing  Eyes: PERRL. No sclera icterus. No conjunctival injection.   ENT: No discharge. Pharynx clear.   Neck: Trachea midline. No obvious mass.    Resp: Mild accessory muscle use.  Scattered crackles. No wheezes. + rhonchi. No dullness on percussion.  CV: Regular rate. Regular rhythm. No murmur or rub. No edema.   GI: Non-tender. Non-distended. No hernia.   Skin: Warm and dry. No nodule on exposed extremities.   Lymph: No cervical LAD. No supraclavicular LAD.   M/S: No cyanosis. No joint deformity. No clubbing.   Neuro: Awake. Alert. Moves all four extremities.   Psych: Oriented x 3. No anxiety.     Scheduled Meds:   vancomycin  1,500 mg IntraVENous Once    sodium chloride flush  5-40 mL IntraVENous 2 times per day    enoxaparin  40 mg SubCUTAneous Daily    nystatin  5 mL Oral 4x Daily    cefepime  2,000 mg IntraVENous Q12H    doxycycline (VIBRAMYCIN) IV  100 mg IntraVENous Q12H    vancomycin (VANCOCIN) intermittent dosing (placeholder)   Other RX Placeholder     Continuous Infusions:   sodium chloride      sodium chloride 100 mL/hr at 09/26/24 1918     PRN Meds:  iopamidol, oxyCODONE, sodium chloride flush, sodium chloride, potassium chloride **OR** potassium alternative oral replacement **OR** potassium chloride, magnesium sulfate, ondansetron **OR** ondansetron, polyethylene glycol, acetaminophen 
Reassessment completed, see flow sheet. BL lungs diminished.  Pt continues to be A/O on 6L. VSS    All ICU Lines and monitoring remain in place. Bed locked in lowest position. Call light within reach.     
Reassessment completed, see flow sheet. BL lungs diminished.  Pt continues to be on 5l. PRN Excedrin for headache 7/10. VSS    All ICU Lines and monitoring remain in place. Bed locked in lowest position. Call light within reach.   
Shift  assessment completed as documented on flowsheet. VSS at this time. Pt awake and alert. Denies pain or discomfort. C/o nausea and had a small amount of emesis. Pt cleaned, gown changed. Call light within reach. Monitoring closely   
Shift assessment completed as documented on flowsheet. Pt awake and alert, ox4.Humphrey pain or discomfort. Pt has strong cough. Purewick in place. Call light within reach.   
Shift assessment completed as documented on flowsheet. VSS. Pt denies pain or discomfort. Call light within reach Monitoring closely   
Shift assessment, completed, see flow sheet. Pt is alert and oriented x 4.  at bedside    On 5L nc. , /66, SpO2 96%. Respirations are easy, even, and unlabored. Bilateral lung sounds diminished. L chest port/PIV, WNL with kvo infusing.     External catheter in place and WNL    Call light within reach. Bed in lowest position. Bed alarm on.     
Shift assessment, completed, see flow sheet. Pt is alert and oriented x 4. Pt's  at bedside     Headache 7/10, messaged MD as pt states only Excedrin relieves HA. Excedrin ordered and given.     On 6 L NC.  , /70, SpO2 97%. Respirations are easy, even, and unlabored. Bilateral lung sounds diminished. Poor appetite, oral intake encouraged. L chest port/PIV, WNL with NS infusing at 100ml/hr.     External catheter in place and wnl. Pt bathed and repositioned    Call light within reach. Bed in lowest position. Bed alarm on.     
Shift report given to [place] at bedside. Patient is stable. All end of shift needs have been met. No further assistance needed at this time.    
Tranfer in from ICU. Patient is alert and oriented x4, on contact, droplet plus precaution due to Covid-19, MRSA, rhinovirus. With port on left upper chest, no blood return noted when checked, all labs will be lab collect. With O2 inhalation via nasal cannula at 2-3lpm, dyspnea with exertion. On heart monitor with continuous pulse ox. Assisted comfortably on bed, with external catheter on.   
Vanco day 4/7  Trough 13.6, Renal labs stable, continue same RX   Rigoberto Dai RPH PharmD 9/29/2024 7:01 PM        
Vancomycin Day # 4/7  Current dose = 750 mg q12h  Indication: PNA nosocomial    Renal function now stable.  A vanc level was obtained yesterday and scheduled vanc therapy started.  Continue same vancomycin dose and schedule.    
Verbal report (droplet plus) and transfer of care given to LEE Cortes.     
  Continue Oral Nutrition Supplement, Continue current diet     Majo Patel  Contact: [phone]    
  Multifocal pneumonia- Staph aureus MRSA. Bronchoscopy 9/27 with large purulent secretions and mucous plugs.  Progression of underlying malignancy cannot be excluded given new lung nodules.  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       PLAN:  Supplemental oxygen to maintain SaO2 >92%; wean as tolerated  BIPAP PRN   Continuous pulse oximetry  Droplet plus isolation   Inhaled bronchodilator therapy  Decadron 6 mg daily D#3  IV Vancomycin cefepime and doxycycline D#4 and Voriconazole D#3-----> Vanc only   Fungal serology including Aspergillus and histo  Follow up BCx so far negative.   Home medications were addressed   Smoking cessation counseling  Electrolytes replacement   Ambien as needed for sleep  Blood sugar control ISS, with goal 150-180  GI prophylaxis: PPI  DVT prophylaxis: Lovenox  MRSA prophylaxis: Bactroban  Limited code per patient and family wishes   Discussed with family multiple good questions were answered.        
on a 0-5 scale):  R LE   Quad   4+   Ant Tib  4-   Hamstring 3+   Iliopsoas 3+  L LE  Quad   4   Ant Tib  4-   Hamstring 3+   Iliopsoas 3+    Lower Extremity Sensation    Diminished over ankle/foot    Coordination  WNL    Tone  WNL    Balance  Static Sitting:  Good ; SBA  Dynamic Sitting:  Good ; SBA   Comments:     Static Standing: Good ; CGA  Dynamic Standing: Good ; CGA  Comments: bilateral HHA    Posture  Seated: Forward head and neck and Thoracic kyphosis  Standing: Forward head and neck and Thoracic kyphosis    Bed Mobility   Supine to Sit:    CGA  Sit to Supine:   CGA  Rolling:   Not Tested   Scooting in sitting: SBA   Scooting in supine:  Total A   Bridging:  Not Tested  Comments:     Transfer Training     Sit to stand:   CGA   Stand to sit:   CGA   Bed to/from Chair:  CGA with use of gait belt and HH assist  Comments:     Gait gait deferred due to fatigue and difficulty with transfers; pt ambulated 0 ft.     Stair Training deferred, pt unsafe/ not appropriate to complete stairs at this time    Therapeutic Exercises Initiated  all completed bilaterally unless indicated  Reclined in chair:  Ankle Pumps x 10 reps  Glut sets x 5x5\" reps  Quad sets x 10x5\" reps  Hip abduction: x 10 reps  Diaphragmatic breathing: x10 breaths    Seated:  N/A    Standing:  N/A    Positioning Needs   Pt reclined in chair, ICU monitoring, positioned in proper neutral alignment and pressure relief provided.   Call light provided and all needs within reach  RN aware of pt position/status    Other Activities  See OT note for ADL's and UE activities.    Patient/Family Education   Pt educated on role of inpatient PT, POC, importance of continued activity, DC recommendations, safety awareness, HEP, and calling for assist with mobility.    Assessment  Pt is a 68yo female presenting today for physical therapy Evaluation and Treatment in the acute care setting. Pt demonstrates limited ROM, decreased strength, and impaired balance, 
structures which may represent superimposed   airspace disease or adenopathy.   3. CT of the chest with contrast is recommended for further evaluation.             IP CONSULT TO PULMONOLOGY  IP CONSULT TO ONCOLOGY  PHARMACY TO DOSE VANCOMYCIN    Assessment/Plan:    Active Hospital Problems    Diagnosis     BERNARD (acute kidney injury) (HCC) [N17.9]     Severe protein-calorie malnutrition (HCC) [E43]     Acute hypoxemic respiratory failure [J96.01]     Acute kidney injury (HCC) [N17.9]     Multifocal pneumonia [J18.9]     Mucus plug in respiratory tract [T17.998A]     Sepsis (HCC) [A41.9]     Pneumonia due to infectious organism [J18.9]     Chronic obstructive pulmonary disease (HCC) [J44.9]     Bronchiectasis without complication (HCC) [J47.9]     Tobacco abuse [Z72.0]     Lactic acidosis [E87.20]     COVID [U07.1]     History of cancer [Z85.9]     Hypokalemia [E87.6]     Hypomagnesemia [E83.42]            #Severe sepsis   #COVID+ with superimposed bacterial pneumonia in immunocompromised patient   #Hypotension   #HAGMA   -HR, BP, WBC, procalc, LA   -blood and sputum cultures pending   -MRSA, legionella, strep pending   -IV vancomycin, doxycycline and cefepime day # 3  -IVF with improvement in HR and BP after  -droplet plus precautions   -hold antihypertensives   -had bronch done, await cx  -pulmonology consulted, moved to ICU for closer f/u      #Hypomagnesemia  #Hypokalemia   -replace   -remain on tele      #BERNARD   -Cr baseline ~0.6, 1.5 on presentation   -likely 2/2 to vomiting and poor PO intake   -IVF   -monitor renal function    Resolved      #Hx of squamous cell carcinoma of the lung   -s/p right middle lobe lobectomy and chemotherapy   -now on immunotherapy   -follows with oncology and pulmonology outpatient   -on oxycodone for chronic pain      #Elev alk phos   -repeat tomorrow  -could be reactive 2/2 to sepsis      #Oral thrush   -diflucan once   -nystatin rinse         DVT Prophylaxis: Lovenox   Diet: ADULT 
during her length of stay to increase independence with ADL performance and functional mobility in order to maximize her functional potential in the acute care setting.      Recommending SNF upon discharge as patient functioning well below baseline, demonstrates good rehab potential and unable to return home due to limited or no family support, inability to negotiate stairs to enter home/bedroom/bathroom, and home environment not conducive to patient recovery.    Goal(s) :   To be met in 3 Visits:  Bed to toilet/BSC:                                                                   SBA  Pt will complete 3/3 CHF goals                                              N/A     To be met in 5 Visits:  Supine to/from Sit in preparation for ADL task:                      Independent  Toileting                                                                                  Supervision  Grooming                                                                                Independent  Upper Body Dressing:                                                            Independent  Lower Body Dressing:                                                            Supervision  Pt to demonstrate UE therapeutic exs x 15 reps with minimal cues     Rehabilitation Potential: Good  Strengths for achieving goals include: Pt motivated, PLOF, Family Support, and Pt cooperative   Barriers to achieving goals include:  Complexity of condition     Plan:  To be seen 3-5 x/wk while in acute care setting for therapeutic exercises, bed mobility, transfers, family/patient education, ADL/IADL retraining, and energy conservation training.    Electronically signed by Saul Barrett OT on 9/29/2024 at 1:48 PM      If patient discharges from this facility prior to next visit, this note will serve as the Discharge Summary    
CLIA-certified  laboratory  and is intended for clinical purposes.  INTERPRETIVE INFORMATION: Respiratory Viral Panel by PCR  A negative result does not rule out the presence of PCR inhibitors in  the  patient specimen or assay specific nucleic acid in concentrations below  the  level of detection by the assay.  Performed By: [facility]  [address] [place] [id number]  : Felipe Drake MD, PhD  CLIA Number: [id number]         Respiratory Virus Mini Panel, Molecular [[id number]] Collected: 09/27/24 0842    Order Status: Completed Updated: 09/27/24 1110     Rsv Source bal    Legionella antigen, urine [[id number]] Collected: 09/27/24 0315    Order Status: Completed Specimen: Urine, clean catch Updated: 09/27/24 0850     L. pneumophila Serogp 1 Ur Ag --     Presumptive Negative  No Legionella pneumophila serogroup 1 antigens detected.  A negative result does not exclude infection with  Legionella pneumophila serogroup 1 nor does it rule out  other microbial-caused respiratory infections or  disease caused by other serogroups of  Legionella pneumophila.  Normal Range: Presumptive Negative      Narrative:      ORDER#: [id number]                          ORDERED BY: XIMENA POSADA  SOURCE: Urine Clean Catch                  COLLECTED:  09/27/24 03:15  ANTIBIOTICS AT ADRIAN.:                      RECEIVED :  09/27/24 03:21    Strep Pneumoniae Antigen [[id number]] Collected: 09/27/24 0315    Order Status: Completed Specimen: Urine, clean catch Updated: 09/27/24 0850     STREP PNEUMONIAE ANTIGEN, URINE --     Presumptive Negative  Presumptive negative suggests no current or recent  pneumococcal infection. Infection due to Strep pneumoniae  cannot be ruled out since the antigen present in the sample  may be below the detection limit of the test.  Normal Range:Presumptive Negative      Narrative:      ORDER#: [id number]                          ORDERED BY: XIMENA POSADA  SOURCE: Urine Clean 
XIMENA  SOURCE: Urine Clean Catch                  COLLECTED:  09/27/24 03:15  ANTIBIOTICS AT ADRIAN.:                      RECEIVED :  09/27/24 03:21    MRSA DNA Probe, Nasal [[id number]] Collected: 09/26/24 1959    Order Status: Completed Specimen: Nares Updated: 09/28/24 0201     MRSA SCREEN RT-PCR --     Negative  MRSA DNA not detected.  Normal Range: Not detected      Narrative:      ORDER#: [id number]                          ORDERED BY: ANA PIRES  SOURCE: Nares                              COLLECTED:  09/26/24 19:59  ANTIBIOTICS AT ADRIAN.:                      RECEIVED :  09/26/24 19:59    Culture, Respiratory [[id number]]     Order Status: No result Specimen: Sputum Expectorated     Legionella antigen, urine [[id number]]     Order Status: Canceled Specimen: Urine     Strep Pneumoniae Antigen [[id number]]     Order Status: Canceled Specimen: Urine, clean catch     Culture, Respiratory [[id number]]     Order Status: Sent Specimen: Bronchial Washing     Aspergillus Galact AG By EIA-A [[id number]]     Order Status: Sent Specimen: Bronchial Washing     COVID-19, Rapid [[id number]]  (Abnormal) Collected: 09/26/24 1609    Order Status: Completed Specimen: Nasopharyngeal Swab Updated: 09/26/24 1628     SARS-CoV-2, NAAT DETECTED     Comment: Rapid NAAT:   Negative results should be treated as presumptive and,  if inconsistent with clinical signs and symptoms or necessary for  patient management, should be tested with an alternative molecular  assay. Negative results do not preclude SARS-CoV-2 infection and  should not be used as the sole basis for patient management decisions.  This test has been authorized by the FDA under an Emergency Use  Authorization (EUA) for use by authorized laboratories.    Fact sheet for Healthcare Providers:  https://www.fda.gov/media/186346/download  Fact sheet for Patients: https://www.fda.gov/media/191481/download    METHODOLOGY: Isothermal Nucleic Acid Amplification         Culture,

## 2024-10-02 NOTE — TELEPHONE ENCOUNTER
Pt daughter called stated Dr. Cool seen her in the hospital last week Thursday threw Sunday and was discharged and the instructions were very vague Pt daughter is wanting a call back from Dr. Cool to explain next steps.     [phone] please call daughter.

## 2024-10-02 NOTE — PLAN OF CARE
Problem: Discharge Planning  Goal: Discharge to home or other facility with appropriate resources  Outcome: Progressing     Problem: Safety - Adult  Goal: Free from fall injury  Outcome: Progressing     Problem: Pain  Goal: Verbalizes/displays adequate comfort level or baseline comfort level  Outcome: Progressing     Problem: Respiratory - Adult  Goal: Achieves optimal ventilation and oxygenation  Outcome: Progressing     Problem: Cardiovascular - Adult  Goal: Absence of cardiac dysrhythmias or at baseline  Outcome: Progressing     Problem: Infection - Adult  Goal: Absence of infection at discharge  Outcome: Progressing     Problem: Nutrition Deficit:  Goal: Optimize nutritional status  Outcome: Progressing  Flowsheets (Taken 10/1/2024 1151 by Majo Patel)  Nutrient intake appropriate for improving, restoring, or maintaining nutritional needs:   Assess nutritional status and recommend course of action   Monitor oral intake, labs, and treatment plans   Recommend appropriate diets, oral nutritional supplements, and vitamin/mineral supplements     Problem: Skin/Tissue Integrity  Goal: Absence of new skin breakdown  Description: 1.  Monitor for areas of redness and/or skin breakdown  2.  Assess vascular access sites hourly  3.  Every 4-6 hours minimum:  Change oxygen saturation probe site  4.  Every 4-6 hours:  If on nasal continuous positive airway pressure, respiratory therapy assess nares and determine need for appliance change or resting period.  Outcome: Progressing     Problem: Skin/Tissue Integrity - Adult  Goal: Skin integrity remains intact  Outcome: Progressing     Problem: Musculoskeletal - Adult  Goal: Return mobility to safest level of function  Outcome: Progressing     Problem: Gastrointestinal - Adult  Goal: Minimal or absence of nausea and vomiting  Outcome: Progressing     Problem: Genitourinary - Adult  Goal: Absence of urinary retention  Outcome: Progressing  Goal: Urinary catheter remains

## 2024-10-02 NOTE — DISCHARGE INSTR - COC
{Esignature:886035288}    CASE MANAGEMENT/SOCIAL WORK SECTION    Inpatient Status Date: 9/26/24 IP    Readmission Risk Assessment Score: 15%  Readmission Risk              Risk of Unplanned Readmission:  25           Discharging to Facility/ Agency   Name: [facility]   Address:  Phone: [phone]  Fax:    [email or web address]     / signature: Electronically signed by Martin Selby RN on 10/2/24 at 11:32 AM EDT    PHYSICIAN SECTION    Prognosis: {Prognosis:6768582519}    Condition at Discharge: { Patient Condition:802510582}    Rehab Potential (if transferring to Rehab): {Prognosis:6049068911}    Recommended Labs or Other Treatments After Discharge: ***    Physician Certification: I certify the above information and transfer of Cristy Thrasher  is necessary for the continuing treatment of the diagnosis listed and that she requires {Admit to Appropriate Level of Care:64957} for {GREATER/LESS:345583280} 30 days.     Update Admission H&P: {CHP DME Changes in HandP:895066882}    PHYSICIAN SIGNATURE:  {Esignature:809603210}

## 2024-10-02 NOTE — FLOWSHEET NOTE
10/01/24 2239   Vital Signs   Temp 97.7 °F (36.5 °C)   Temp Source Oral   Pulse 84   Heart Rate Source Monitor   Respirations 16   BP (!) 163/73   MAP (Calculated) 103   BP Location Left upper arm   BP Method Automatic   Patient Position Semi fowlers   Pain Assessment   Pain Assessment None - Denies Pain   Opioid-Induced Sedation   POSS Score 1   RASS   Valente Agitation Sedation Scale (RASS) 0   Oxygen Therapy   SpO2 96 %   O2 Device High flow nasal cannula   O2 Flow Rate (L/min) 2 L/min     Shift assessment completed. Pt is alert and oriented. Vital signs are WNL. Respirations are even & easy. No complaints voiced. Pt denies needs at this time. SR up x 2 and bed in low position. Call light is within reach.

## 2024-10-02 NOTE — CARE COORDINATION
Met with pt at bedside. Pt spouse and daughter present. Aware pt will need home o2 per walk test. Gabriele Lilly called and updated. Per Gabriele pt ok to go home with o2 thru [facility].  Equipment delivered to pt room. Pt agreeable to [facility]. Requested [facility]. Referral made. Unable to accept at this time due to staffing. 2nd choice [facility]. Referral called to [facility]. Accepted pt. SOC to be done tmrw. Family to arrange transport home. No further DC or DME needs identified.         IMM- 10/2    O2- 94% RA

## 2024-10-02 NOTE — TELEPHONE ENCOUNTER
Patient was discharged today by IM   Discharged on Abx to complete 10 days   See in 4-6 weeks with CT chest

## 2024-10-02 NOTE — DISCHARGE SUMMARY
Name:  Cristy Thrasher  Room:  0223/0223-01  MRN:    [id number]    Discharge Summary      This discharge summary is in conjunction with a complete physical exam done on the day of discharge.      Discharging Physician: SAI LAYNE MD      Admit: 9/26/2024  Discharge:  10/2/2024     Diagnoses this Admission    Principal Problem:    Sepsis (HCC)  Active Problems:    Pneumonia due to infectious organism    Chronic obstructive pulmonary disease (HCC)    Bronchiectasis without complication (HCC)    Tobacco abuse    Lactic acidosis    COVID    History of cancer    Hypokalemia    Hypomagnesemia    BERNARD (acute kidney injury) (HCC)    Severe protein-calorie malnutrition (HCC)    Acute hypoxemic respiratory failure    Acute kidney injury (HCC)    Multifocal pneumonia    Mucus plug in respiratory tract  Resolved Problems:    * No resolved hospital problems. *          Procedures (Please Review Full Report for Details)      Consults    IP CONSULT TO PULMONOLOGY  IP CONSULT TO ONCOLOGY  PHARMACY TO DOSE VANCOMYCIN  IP CONSULT TO HOME CARE NEEDS      HPI:       The patient is a 69 y.o. female with pmhx of lung cancer s/p lobectomy, HTN who presented to [facility] ED with complaint of not feeling well for the past 10 days. Symptoms originally upper respiratory and then now with productive cough white/green phlegm.Has shortness of breath with exertion and is unable to walk far distances 2/2 to this. No chest pain but does have heart racing sensation. Did have temp of 100 at home. +Non-bloody emesis. No diarrhea. Has had poor PO intake 2/2 to not feeling well. Feels light-headed and weak with ambulation       was also sick with similar symptoms but only for a couple days.   Last immunotherapy treatment was about 3 weeks ago and she is suppose to have 2nd one on Monday.          Hospital Course        #Severe sepsis   #COVID+ with superimposed bacterial pneumonia in immunocompromised patient   #Hypotension

## 2024-10-03 LAB
FUNGUS SPEC CULT: ABNORMAL
FUNGUS SPEC CULT: ABNORMAL
LOEFFLER MB STN SPEC: ABNORMAL
ORGANISM: ABNORMAL

## 2024-10-06 LAB
FINAL REPORT: NORMAL
FINAL REPORT: NORMAL
PRELIMINARY: NORMAL
PRELIMINARY: NORMAL

## 2024-10-08 LAB
ACID FAST STN SPEC QL: NORMAL
ACID FAST STN SPEC QL: NORMAL
MYCOBACTERIUM SPEC CULT: NORMAL
MYCOBACTERIUM SPEC CULT: NORMAL

## 2024-10-09 LAB
FINAL REPORT: NORMAL
FINAL REPORT: NORMAL
PRELIMINARY: NORMAL
PRELIMINARY: NORMAL

## 2024-10-10 ENCOUNTER — HOSPITAL ENCOUNTER (OUTPATIENT)
Dept: PULMONOLOGY | Age: 69
Discharge: HOME | End: 2024-10-10
Payer: MEDICARE

## 2024-10-10 DIAGNOSIS — J47.9 BRONCHIECTASIS WITHOUT COMPLICATION (HCC): ICD-10-CM

## 2024-10-10 DIAGNOSIS — J44.9 OBSTRUCTIVE AIRWAY DISEASE (HCC): ICD-10-CM

## 2024-10-10 LAB
FUNGUS SPEC CULT: ABNORMAL
LOEFFLER MB STN SPEC: ABNORMAL
ORGANISM: ABNORMAL

## 2024-10-10 PROCEDURE — 94618 PULMONARY STRESS TESTING: CPT

## 2024-10-21 LAB
FUNGUS SPEC CULT: ABNORMAL
LOEFFLER MB STN SPEC: ABNORMAL
ORGANISM: ABNORMAL

## 2024-10-28 LAB
FUNGUS SPEC CULT: ABNORMAL
FUNGUS SPEC CULT: ABNORMAL
LOEFFLER MB STN SPEC: ABNORMAL
LOEFFLER MB STN SPEC: ABNORMAL
ORGANISM: ABNORMAL
ORGANISM: ABNORMAL

## 2024-11-05 ENCOUNTER — TELEPHONE (OUTPATIENT)
Dept: PULMONOLOGY | Age: 69
End: 2024-11-05

## 2024-11-05 DIAGNOSIS — R91.1 PULMONARY NODULE: Primary | ICD-10-CM

## 2024-11-06 ENCOUNTER — HOSPITAL ENCOUNTER (OUTPATIENT)
Dept: CT IMAGING | Age: 69
Discharge: HOME | End: 2024-11-06
Payer: MEDICARE

## 2024-11-06 DIAGNOSIS — R91.1 PULMONARY NODULE: ICD-10-CM

## 2024-11-06 PROCEDURE — 71250 CT THORAX DX C-: CPT

## 2024-11-12 ENCOUNTER — TELEPHONE (OUTPATIENT)
Dept: PULMONOLOGY | Age: 69
End: 2024-11-12

## 2024-11-12 ENCOUNTER — OFFICE VISIT (OUTPATIENT)
Dept: PULMONOLOGY | Age: 69
End: 2024-11-12
Payer: MEDICARE

## 2024-11-12 VITALS
WEIGHT: 127 LBS | HEIGHT: 64 IN | BODY MASS INDEX: 21.68 KG/M2 | OXYGEN SATURATION: 100 % | HEART RATE: 99 BPM | DIASTOLIC BLOOD PRESSURE: 72 MMHG | SYSTOLIC BLOOD PRESSURE: 108 MMHG

## 2024-11-12 DIAGNOSIS — R91.1 PULMONARY NODULE: ICD-10-CM

## 2024-11-12 DIAGNOSIS — R06.02 SOB (SHORTNESS OF BREATH): ICD-10-CM

## 2024-11-12 DIAGNOSIS — R93.89 ABNORMAL CT OF THE CHEST: Primary | ICD-10-CM

## 2024-11-12 DIAGNOSIS — J47.9 BRONCHIECTASIS WITHOUT COMPLICATION (HCC): ICD-10-CM

## 2024-11-12 PROCEDURE — 1124F ACP DISCUSS-NO DSCNMKR DOCD: CPT | Performed by: INTERNAL MEDICINE

## 2024-11-12 PROCEDURE — 99214 OFFICE O/P EST MOD 30 MIN: CPT | Performed by: INTERNAL MEDICINE

## 2024-11-12 PROCEDURE — 4004F PT TOBACCO SCREEN RCVD TLK: CPT | Performed by: INTERNAL MEDICINE

## 2024-11-12 PROCEDURE — 3017F COLORECTAL CA SCREEN DOC REV: CPT | Performed by: INTERNAL MEDICINE

## 2024-11-12 PROCEDURE — G8420 CALC BMI NORM PARAMETERS: HCPCS | Performed by: INTERNAL MEDICINE

## 2024-11-12 PROCEDURE — G8427 DOCREV CUR MEDS BY ELIG CLIN: HCPCS | Performed by: INTERNAL MEDICINE

## 2024-11-12 PROCEDURE — 1090F PRES/ABSN URINE INCON ASSESS: CPT | Performed by: INTERNAL MEDICINE

## 2024-11-12 PROCEDURE — G8400 PT W/DXA NO RESULTS DOC: HCPCS | Performed by: INTERNAL MEDICINE

## 2024-11-12 PROCEDURE — 1159F MED LIST DOCD IN RCRD: CPT | Performed by: INTERNAL MEDICINE

## 2024-11-12 PROCEDURE — G8484 FLU IMMUNIZE NO ADMIN: HCPCS | Performed by: INTERNAL MEDICINE

## 2024-11-12 RX ORDER — LEVALBUTEROL TARTRATE 45 UG/1
2 AEROSOL, METERED ORAL EVERY 6 HOURS PRN
Qty: 1 EACH | Refills: 3 | Status: SHIPPED | OUTPATIENT
Start: 2024-11-12 | End: 2025-11-12

## 2024-11-12 RX ORDER — NICOTINE 21 MG/24HR
1 PATCH, TRANSDERMAL 24 HOURS TRANSDERMAL EVERY 24 HOURS
Qty: 42 PATCH | Refills: 0 | Status: SHIPPED | OUTPATIENT
Start: 2024-11-12 | End: 2024-12-24

## 2024-11-12 NOTE — TELEPHONE ENCOUNTER
Patient forgot to ask for a refill request today while in the office.    Inhaler:  Levalbuterol Tartrate use as needed.      [facility] - [place], [place] - [address] - P [phone] - F [phone]  [address] [address]  Phone: [phone]  Fax: [phone]

## 2024-11-12 NOTE — PROGRESS NOTES
[facility]                                                                    CHIEF COMPLAINT: Follow-up Bronchiectasis /hospitalization/CT chest       HPI:   CT chest reviewed by me and noted below. Results were dicussed with patient and multiple good questions were answered.   Admitted October treated for MRSA PNA   Doing fine   Sent her O2 back, not needing it, remains > 90%   Cough with clear sputum  No hemoptysis   Smoking < 1/2 ppd   Xopenex once a day         From prior visit:   69 y.o. had CT chest to evaluate for chest pain. CT reviewed  by me and showed small RML 9 mm nodule. Not sure what makes better or worse. Has SOB cough and wheezes. Cough with clear sputum. No hemoptysis. No fever. Smoker 1 ppd since age 22. Used Albuterol up until 1 week ago stopped pallavi to chest pain. No cancer history.  Old records from outside reviewed by me and summarized.    Past Medical History:   Diagnosis Date    Arthritis     Cancer (HCC)     Cataract     Chronic obstructive pulmonary disease (HCC) 9/26/2024    Coronary artery disease involving native coronary artery of native heart without angina pectoris 04/11/2024    Hiatal hernia     Hyperlipidemia     Hypertension        Past Surgical History:        Procedure Laterality Date    BREAST LUMPECTOMY  1984    BREAST LUMPECTOMY  1985    BRONCHOSCOPY N/A 9/27/2024    BRONCH NF performed by James Cool MD at [facility] ENDOSCOPY    BRONCHOSCOPY  9/27/2024    BRONCHOSCOPY THERAPEUTIC ASPIRATION INITIAL performed by James Cool MD at [facility] ENDOSCOPY    CATARACT EXTRACTION      Pt has cataract in right eye but states has not been removed    COLONOSCOPY  2022    COLONOSCOPY  2014    GALLBLADDER SURGERY  2005    HYSTERECTOMY (CERVIX STATUS UNKNOWN)  1980    PORT SURGERY Left 5/16/2024    PORT INSERTION performed by Tariq Grant MD at [facility] OR    SMALL INTESTINE SURGERY  1971    THORACOSCOPY

## 2024-11-18 DIAGNOSIS — R91.1 PULMONARY NODULE: ICD-10-CM

## 2024-11-18 RX ORDER — FLUTICASONE FUROATE, UMECLIDINIUM BROMIDE AND VILANTEROL TRIFENATATE 100; 62.5; 25 UG/1; UG/1; UG/1
POWDER RESPIRATORY (INHALATION)
Qty: 60 EACH | Refills: 5 | Status: SHIPPED | OUTPATIENT
Start: 2024-11-18 | End: 2024-11-18 | Stop reason: SDUPTHER

## 2024-11-18 RX ORDER — FLUTICASONE FUROATE, UMECLIDINIUM BROMIDE AND VILANTEROL TRIFENATATE 100; 62.5; 25 UG/1; UG/1; UG/1
1 POWDER RESPIRATORY (INHALATION) DAILY
Qty: 60 EACH | Refills: 5 | Status: SHIPPED | OUTPATIENT
Start: 2024-11-18

## 2024-11-18 NOTE — TELEPHONE ENCOUNTER
Patient called in needing a refill for Trelegy sent to [facility]   Possible allergies versus asthma, PFTs unremarkable  Previously following with pulmonology  Continue albuterol as needed

## 2025-02-18 ENCOUNTER — HOSPITAL ENCOUNTER (OUTPATIENT)
Dept: CT IMAGING | Age: 70
Discharge: HOME | End: 2025-02-18
Attending: INTERNAL MEDICINE
Payer: MEDICARE

## 2025-02-18 DIAGNOSIS — C34.2 MALIGNANT NEOPLASM OF MIDDLE LOBE, BRONCHUS OR LUNG (HCC): ICD-10-CM

## 2025-02-18 PROCEDURE — 6360000004 HC RX CONTRAST MEDICATION: Performed by: INTERNAL MEDICINE

## 2025-02-18 PROCEDURE — 74177 CT ABD & PELVIS W/CONTRAST: CPT

## 2025-02-18 RX ORDER — IOPAMIDOL 755 MG/ML
75 INJECTION, SOLUTION INTRAVASCULAR
Status: COMPLETED | OUTPATIENT
Start: 2025-02-18 | End: 2025-02-18

## 2025-02-18 RX ORDER — DIATRIZOATE MEGLUMINE AND DIATRIZOATE SODIUM 660; 100 MG/ML; MG/ML
12 SOLUTION ORAL; RECTAL
Status: DISCONTINUED | OUTPATIENT
Start: 2025-02-18 | End: 2025-02-19 | Stop reason: HOSPADM

## 2025-02-18 RX ADMIN — DIATRIZOATE MEGLUMINE AND DIATRIZOATE SODIUM 12 ML: 660; 100 LIQUID ORAL; RECTAL at 15:30

## 2025-02-18 RX ADMIN — IOPAMIDOL 75 ML: 755 INJECTION, SOLUTION INTRAVENOUS at 15:30

## 2025-02-21 ENCOUNTER — TRANSCRIBE ORDERS (OUTPATIENT)
Dept: ADMINISTRATIVE | Age: 70
End: 2025-02-21

## 2025-02-21 DIAGNOSIS — C34.2 MALIGNANT NEOPLASM OF MIDDLE LOBE, BRONCHUS OR LUNG (HCC): Primary | ICD-10-CM

## 2025-02-21 DIAGNOSIS — K86.89 PANCREATIC MASS: ICD-10-CM

## 2025-02-28 ENCOUNTER — HOSPITAL ENCOUNTER (OUTPATIENT)
Dept: MRI IMAGING | Age: 70
Discharge: HOME | End: 2025-02-28
Attending: INTERNAL MEDICINE
Payer: COMMERCIAL

## 2025-02-28 DIAGNOSIS — K86.89 PANCREATIC MASS: ICD-10-CM

## 2025-02-28 DIAGNOSIS — C34.2 MALIGNANT NEOPLASM OF MIDDLE LOBE, BRONCHUS OR LUNG (HCC): ICD-10-CM

## 2025-02-28 LAB
PERFORMED ON: ABNORMAL
POC CREATININE: 1.1 MG/DL (ref 0.6–1.2)
POC SAMPLE TYPE: ABNORMAL

## 2025-02-28 PROCEDURE — A9579 GAD-BASE MR CONTRAST NOS,1ML: HCPCS | Performed by: INTERNAL MEDICINE

## 2025-02-28 PROCEDURE — 74183 MRI ABD W/O CNTR FLWD CNTR: CPT

## 2025-02-28 PROCEDURE — 82565 ASSAY OF CREATININE: CPT

## 2025-02-28 PROCEDURE — 6360000004 HC RX CONTRAST MEDICATION: Performed by: INTERNAL MEDICINE

## 2025-02-28 RX ADMIN — GADOTERIDOL 12 ML: 279.3 INJECTION, SOLUTION INTRAVENOUS at 17:19

## 2025-05-08 ENCOUNTER — HOSPITAL ENCOUNTER (OUTPATIENT)
Dept: CT IMAGING | Age: 70
Discharge: HOME | End: 2025-05-08
Attending: RADIOLOGY
Payer: MEDICARE

## 2025-05-08 DIAGNOSIS — C34.90 NON-SMALL CELL LUNG CANCER, UNSPECIFIED LATERALITY (HCC): ICD-10-CM

## 2025-05-08 PROCEDURE — 71250 CT THORAX DX C-: CPT

## 2025-05-12 ENCOUNTER — OFFICE VISIT (OUTPATIENT)
Dept: PULMONOLOGY | Age: 70
End: 2025-05-12
Payer: MEDICARE

## 2025-05-12 VITALS
HEIGHT: 64 IN | WEIGHT: 118.6 LBS | SYSTOLIC BLOOD PRESSURE: 126 MMHG | OXYGEN SATURATION: 97 % | RESPIRATION RATE: 16 BRPM | DIASTOLIC BLOOD PRESSURE: 82 MMHG | BODY MASS INDEX: 20.25 KG/M2 | HEART RATE: 102 BPM

## 2025-05-12 DIAGNOSIS — R91.1 PULMONARY NODULE: Primary | ICD-10-CM

## 2025-05-12 DIAGNOSIS — J47.9 BRONCHIECTASIS WITHOUT COMPLICATION (HCC): ICD-10-CM

## 2025-05-12 PROCEDURE — 3017F COLORECTAL CA SCREEN DOC REV: CPT | Performed by: INTERNAL MEDICINE

## 2025-05-12 PROCEDURE — 4004F PT TOBACCO SCREEN RCVD TLK: CPT | Performed by: INTERNAL MEDICINE

## 2025-05-12 PROCEDURE — 99214 OFFICE O/P EST MOD 30 MIN: CPT | Performed by: INTERNAL MEDICINE

## 2025-05-12 PROCEDURE — G8400 PT W/DXA NO RESULTS DOC: HCPCS | Performed by: INTERNAL MEDICINE

## 2025-05-12 PROCEDURE — 1124F ACP DISCUSS-NO DSCNMKR DOCD: CPT | Performed by: INTERNAL MEDICINE

## 2025-05-12 PROCEDURE — G8420 CALC BMI NORM PARAMETERS: HCPCS | Performed by: INTERNAL MEDICINE

## 2025-05-12 PROCEDURE — G8427 DOCREV CUR MEDS BY ELIG CLIN: HCPCS | Performed by: INTERNAL MEDICINE

## 2025-05-12 PROCEDURE — 1159F MED LIST DOCD IN RCRD: CPT | Performed by: INTERNAL MEDICINE

## 2025-05-12 PROCEDURE — 1090F PRES/ABSN URINE INCON ASSESS: CPT | Performed by: INTERNAL MEDICINE

## 2025-05-12 RX ORDER — HYDROXYZINE HYDROCHLORIDE 25 MG/1
1 TABLET, FILM COATED ORAL
COMMUNITY
Start: 2025-04-28

## 2025-05-12 RX ORDER — BENZONATATE 100 MG/1
1 CAPSULE ORAL
COMMUNITY
Start: 2024-12-20

## 2025-05-12 NOTE — PROGRESS NOTES
[facility]                                                                    CHIEF COMPLAINT: Follow-up CT chest       HPI:   CT chest reviewed by me and noted below. Results were dicussed with patient and multiple good questions were answered.   Doing pretty good  White clear sputum  No hemoptysis   Smoking < 1/2 ppd   Xopenex once a day         From prior visit:   69 y.o. had CT chest to evaluate for chest pain. CT reviewed  by me and showed small RML 9 mm nodule. Not sure what makes better or worse. Has SOB cough and wheezes. Cough with clear sputum. No hemoptysis. No fever. Smoker 1 ppd since age 22. Used Albuterol up until 1 week ago stopped pallavi to chest pain. No cancer history.  Old records from outside reviewed by me and summarized.    Past Medical History:   Diagnosis Date    Arthritis     Cancer (HCC)     Cataract     Chronic obstructive pulmonary disease (HCC) 9/26/2024    Coronary artery disease involving native coronary artery of native heart without angina pectoris 04/11/2024    Hiatal hernia     Hyperlipidemia     Hypertension        Past Surgical History:        Procedure Laterality Date    BREAST LUMPECTOMY  1984    BREAST LUMPECTOMY  1985    BRONCHOSCOPY N/A 9/27/2024    BRONCH NF performed by James Cool MD at [facility] ENDOSCOPY    BRONCHOSCOPY  9/27/2024    BRONCHOSCOPY THERAPEUTIC ASPIRATION INITIAL performed by James Cool MD at [facility] ENDOSCOPY    CATARACT EXTRACTION      Pt has cataract in right eye but states has not been removed    COLONOSCOPY  2022    COLONOSCOPY  2014    GALLBLADDER SURGERY  2005    HYSTERECTOMY (CERVIX STATUS UNKNOWN)  1980    PORT SURGERY Left 5/16/2024    PORT INSERTION performed by Tariq Grant MD at [facility] OR    SMALL INTESTINE SURGERY  1971    THORACOSCOPY Right 4/15/2024    RIGHT ROBOTIC MIDDLE LOBE  LOBECTOMY WITH LYMPH NODE DISSECTION WITH CRYOABLATION NERVE BLOCK LEVELS 5-8;

## 2025-06-06 ENCOUNTER — HOSPITAL ENCOUNTER (OUTPATIENT)
Dept: MRI IMAGING | Age: 70
Discharge: HOME | End: 2025-06-06
Payer: MEDICARE

## 2025-06-06 DIAGNOSIS — C34.2 PRIMARY MALIGNANT NEOPLASM OF MIDDLE LOBE, BRONCHUS, OR LUNG (HCC): ICD-10-CM

## 2025-06-06 PROCEDURE — 6360000004 HC RX CONTRAST MEDICATION: Performed by: NURSE PRACTITIONER

## 2025-06-06 PROCEDURE — 70553 MRI BRAIN STEM W/O & W/DYE: CPT

## 2025-06-06 PROCEDURE — A9579 GAD-BASE MR CONTRAST NOS,1ML: HCPCS | Performed by: NURSE PRACTITIONER

## 2025-06-06 RX ADMIN — GADOTERIDOL 10 ML: 279.3 INJECTION, SOLUTION INTRAVENOUS at 10:42

## 2025-06-23 DIAGNOSIS — R91.1 PULMONARY NODULE: ICD-10-CM

## 2025-06-24 RX ORDER — FLUTICASONE FUROATE, UMECLIDINIUM BROMIDE AND VILANTEROL TRIFENATATE 100; 62.5; 25 UG/1; UG/1; UG/1
1 POWDER RESPIRATORY (INHALATION) DAILY
Qty: 60 EACH | Refills: 5 | Status: SHIPPED | OUTPATIENT
Start: 2025-06-24

## (undated) DEVICE — MHCZ MINOR: Brand: MEDLINE INDUSTRIES, INC.

## (undated) DEVICE — CATHETER THOR 28FR SIL 6 EYE STR HI TEAR STRENGTH

## (undated) DEVICE — COLUMN DRAPE

## (undated) DEVICE — SYRINGE MED 50ML LUERSLIP TIP

## (undated) DEVICE — RADIFOCUS GLIDEWIRE: Brand: GLIDEWIRE

## (undated) DEVICE — CONNECTOR PERF W0.25XH3/8IN BASE Y SHP REDUC W/O LUERLOCK

## (undated) DEVICE — YANKAUER,OPEN TIP,W/O VENT,STERILE: Brand: MEDLINE INDUSTRIES, INC.

## (undated) DEVICE — ELECTRODE ECG MONITR FOAM TEAR DROP ADLT RED

## (undated) DEVICE — SINGLE USE SUCTION VALVE MAJ-209: Brand: SINGLE USE SUCTION VALVE (STERILE)

## (undated) DEVICE — GLOVE ORANGE PI 7 1/2   MSG9075

## (undated) DEVICE — STAPLER ECHELON 3000 45MM STANDARD

## (undated) DEVICE — CURVED BIPOLAR DISSECTOR: Brand: ENDOWRIST

## (undated) DEVICE — SUTURE NONABSORBABLE MONOFILAMENT 4-0 RB-1 36 IN BLU PROLENE 8557H

## (undated) DEVICE — LIQUIBAND RAPID ADHESIVE 36/CS 0.8ML: Brand: MEDLINE

## (undated) DEVICE — SYRINGE MED 10ML SLIP TIP BLNT FILL AND LUERLOCK DISP

## (undated) DEVICE — LUBE FOAM PAD ELECTRO ANTI STICK

## (undated) DEVICE — 3M™ TEGADERM™ TRANSPARENT FILM DRESSING FRAME STYLE, 1626W, 4 IN X 4-3/4 IN (10 CM X 12 CM), 50/CT 4CT/CASE: Brand: 3M™ TEGADERM™

## (undated) DEVICE — SYRINGE BLB 50CC IRRIG PLIABLE FNGR FLNG GRAD FLSK DISP

## (undated) DEVICE — Z DISCONTINUED USE 2220306 SUTURE VICRYL + SZ 3-0 L27IN ABSRB WHT CT-1 1/2 CIR VCP258H

## (undated) DEVICE — REDUCER: Brand: ENDOWRIST

## (undated) DEVICE — SUTURE VICRYL SZ 3-0 L18IN ABSRB UD L26MM SH 1/2 CIR J864D

## (undated) DEVICE — SUTURE MONOCRYL + SZ 4-0 L18IN ABSRB UD L19MM PS-2 3/8 CIR MCP496G

## (undated) DEVICE — 60 ML SYRINGE REGULAR TIP: Brand: MONOJECT

## (undated) DEVICE — ARM DRAPE

## (undated) DEVICE — PENCIL ES CRD L10FT HND SWCHING ROCK SWCH W/ EDGE COAT BLDE

## (undated) DEVICE — CADIERE FORCEPS: Brand: ENDOWRIST

## (undated) DEVICE — MEDI-VAC NON-CONDUCTIVE SUCTION TUBING: Brand: CARDINAL HEALTH

## (undated) DEVICE — PROBE CRYOSURGERY 18IN BALL TIP 8MM ERGO HNDL BEND DST SHFT

## (undated) DEVICE — CONMED SCOPE SAVER BITE BLOCK, 20X27 MM: Brand: SCOPE SAVER

## (undated) DEVICE — CO2 CANNULA,SSOFT,ADLT,7O2,7CO2,M,BLK: Brand: MEDLINE

## (undated) DEVICE — SUCTION IRRIGATOR: Brand: ENDOWRIST

## (undated) DEVICE — SYRINGE MED 50ML LUERLOCK TIP

## (undated) DEVICE — GOWN SIRUS NONREIN XL W/TWL: Brand: MEDLINE INDUSTRIES, INC.

## (undated) DEVICE — ELECTRODE LAP L36CM PTFE WIRE J HK CLEANCOAT

## (undated) DEVICE — SUTURE VICRYL SZ 4-0 L18IN ABSRB UD L19MM PS-2 3/8 CIR PRIM J496H

## (undated) DEVICE — STAPLER 30 RELOAD GREEN: Brand: ENDOWRIST

## (undated) DEVICE — Device

## (undated) DEVICE — TISSUE RETRIEVAL SYSTEM: Brand: INZII RETRIEVAL SYSTEM

## (undated) DEVICE — 3M™ IOBAN™ 2 ANTIMICROBIAL INCISE DRAPE 6650EZ: Brand: IOBAN™ 2

## (undated) DEVICE — BLADE ES ELASTOMERIC COAT INSUL DURABLE BEND UPTO 90DEG

## (undated) DEVICE — SINGLE USE BIOPSY VALVE MAJ-210: Brand: SINGLE USE BIOPSY VALVE (STERILE)

## (undated) DEVICE — SUREFORM 45: Brand: SUREFORM

## (undated) DEVICE — SHEET,DRAPE,40X58,STERILE: Brand: MEDLINE

## (undated) DEVICE — CURVED-TIP STAPLER 30: Brand: ENDOWRIST

## (undated) DEVICE — GOWN SIRUS NONREIN LG W/TWL: Brand: MEDLINE INDUSTRIES, INC.

## (undated) DEVICE — DRAPE C ARM UNIV W41XL74IN CLR PLAS XR VELC CLSR POLY STRP

## (undated) DEVICE — SUTURE VICRYL SZ 0 L36IN ABSRB UD CT-1 L36MM 1/2 CIR TAPR PNT VCP946H

## (undated) DEVICE — MEDIUM-LARGE CLIP APPLIER: Brand: ENDOWRIST

## (undated) DEVICE — GAUZE,SPONGE,4"X4",8PLY,STRL,LF,10/TRAY: Brand: MEDLINE

## (undated) DEVICE — TIP-UP FENESTRATED GRASPER: Brand: ENDOWRIST

## (undated) DEVICE — AGENT HEMSTAT W3XL4IN OXIDIZED REGENERATED CELOS ABSRB FOR

## (undated) DEVICE — RELOAD STPL L45MM H1-2.6MM MESENTERY THN TISS WHT GRIPPING

## (undated) DEVICE — SEAL

## (undated) DEVICE — SUTURE PERMA-HAND SZ 0 L18IN NONABSORBABLE BLK L30MM FSL 678G

## (undated) DEVICE — BASIC SINGLE BASIN 1-LF: Brand: MEDLINE INDUSTRIES, INC.

## (undated) DEVICE — 1LYRTR 16FR10ML100%SILTMPS SNP: Brand: MEDLINE INDUSTRIES, INC.

## (undated) DEVICE — STAPLER 30 RELOAD WHITE: Brand: ENDOWRIST